# Patient Record
Sex: MALE | Race: WHITE | NOT HISPANIC OR LATINO | Employment: OTHER | URBAN - NONMETROPOLITAN AREA
[De-identification: names, ages, dates, MRNs, and addresses within clinical notes are randomized per-mention and may not be internally consistent; named-entity substitution may affect disease eponyms.]

---

## 2017-05-08 ENCOUNTER — OFFICE VISIT (OUTPATIENT)
Dept: UROLOGY | Facility: CLINIC | Age: 67
End: 2017-05-08

## 2017-05-08 VITALS
WEIGHT: 162.4 LBS | DIASTOLIC BLOOD PRESSURE: 72 MMHG | HEIGHT: 69 IN | SYSTOLIC BLOOD PRESSURE: 128 MMHG | TEMPERATURE: 97.3 F | BODY MASS INDEX: 24.05 KG/M2

## 2017-05-08 DIAGNOSIS — N40.1 BPH WITH URINARY OBSTRUCTION: Primary | ICD-10-CM

## 2017-05-08 DIAGNOSIS — N13.8 BPH WITH URINARY OBSTRUCTION: Primary | ICD-10-CM

## 2017-05-08 DIAGNOSIS — R31.29 MICROSCOPIC HEMATURIA: ICD-10-CM

## 2017-05-08 LAB
BILIRUB BLD-MCNC: NEGATIVE MG/DL
CLARITY, POC: CLEAR
COLOR UR: YELLOW
GLUCOSE UR STRIP-MCNC: NEGATIVE MG/DL
KETONES UR QL: NEGATIVE
LEUKOCYTE EST, POC: NEGATIVE
NITRITE UR-MCNC: NEGATIVE MG/ML
PH UR: 7 [PH] (ref 5–8)
PROT UR STRIP-MCNC: NEGATIVE MG/DL
RBC # UR STRIP: NEGATIVE /UL
SP GR UR: 1.01 (ref 1–1.03)
UROBILINOGEN UR QL: NORMAL

## 2017-05-08 PROCEDURE — 81003 URINALYSIS AUTO W/O SCOPE: CPT | Performed by: UROLOGY

## 2017-05-08 PROCEDURE — 99213 OFFICE O/P EST LOW 20 MIN: CPT | Performed by: UROLOGY

## 2017-10-28 DIAGNOSIS — N40.1 BPH WITH URINARY OBSTRUCTION: ICD-10-CM

## 2017-10-28 DIAGNOSIS — N13.8 BPH WITH URINARY OBSTRUCTION: ICD-10-CM

## 2017-10-30 RX ORDER — TAMSULOSIN HYDROCHLORIDE 0.4 MG/1
CAPSULE ORAL
Qty: 90 CAPSULE | Refills: 3 | Status: SHIPPED | OUTPATIENT
Start: 2017-10-30 | End: 2018-05-30 | Stop reason: SDUPTHER

## 2018-03-23 LAB
ALBUMIN SERPL-MCNC: 4.4 G/DL (ref 3.5–5.2)
ALP BLD-CCNC: 60 U/L (ref 40–130)
ALT SERPL-CCNC: 15 U/L (ref 5–41)
AST SERPL-CCNC: 18 U/L (ref 5–40)
BASOPHILS ABSOLUTE: 0 K/UL (ref 0–0.2)
BASOPHILS RELATIVE PERCENT: 1.2 % (ref 0–1)
BILIRUB SERPL-MCNC: 0.6 MG/DL (ref 0.2–1.2)
BILIRUBIN DIRECT: 0.1 MG/DL (ref 0–0.3)
BILIRUBIN, INDIRECT: 0.5 MG/DL (ref 0.1–1)
EOSINOPHILS ABSOLUTE: 0.1 K/UL (ref 0–0.6)
EOSINOPHILS RELATIVE PERCENT: 2.9 % (ref 0–5)
HCT VFR BLD CALC: 41.9 % (ref 42–52)
HEMOGLOBIN: 14.2 G/DL (ref 14–18)
LYMPHOCYTES ABSOLUTE: 0.7 K/UL (ref 1.1–4.5)
LYMPHOCYTES RELATIVE PERCENT: 20.9 % (ref 20–40)
MCH RBC QN AUTO: 31.8 PG (ref 27–31)
MCHC RBC AUTO-ENTMCNC: 33.9 G/DL (ref 33–37)
MCV RBC AUTO: 93.7 FL (ref 80–94)
MONOCYTES ABSOLUTE: 0.4 K/UL (ref 0–0.9)
MONOCYTES RELATIVE PERCENT: 11.2 % (ref 0–10)
NEUTROPHILS ABSOLUTE: 2.2 K/UL (ref 1.5–7.5)
NEUTROPHILS RELATIVE PERCENT: 63.5 % (ref 50–65)
PDW BLD-RTO: 12.2 % (ref 11.5–14.5)
PLATELET # BLD: 216 K/UL (ref 130–400)
PMV BLD AUTO: 10.3 FL (ref 9.4–12.4)
RBC # BLD: 4.47 M/UL (ref 4.7–6.1)
TOTAL PROTEIN: 6.5 G/DL (ref 6.6–8.7)
WBC # BLD: 3.4 K/UL (ref 4.8–10.8)

## 2018-05-30 ENCOUNTER — OFFICE VISIT (OUTPATIENT)
Dept: UROLOGY | Facility: CLINIC | Age: 68
End: 2018-05-30

## 2018-05-30 VITALS
WEIGHT: 163.8 LBS | HEIGHT: 69 IN | TEMPERATURE: 98 F | SYSTOLIC BLOOD PRESSURE: 130 MMHG | DIASTOLIC BLOOD PRESSURE: 70 MMHG | BODY MASS INDEX: 24.26 KG/M2

## 2018-05-30 DIAGNOSIS — N13.8 BPH WITH URINARY OBSTRUCTION: Primary | ICD-10-CM

## 2018-05-30 DIAGNOSIS — R31.29 MICROSCOPIC HEMATURIA: ICD-10-CM

## 2018-05-30 DIAGNOSIS — N40.1 BPH WITH URINARY OBSTRUCTION: Primary | ICD-10-CM

## 2018-05-30 LAB
BILIRUB BLD-MCNC: NEGATIVE MG/DL
CLARITY, POC: CLEAR
COLOR UR: YELLOW
GLUCOSE UR STRIP-MCNC: NEGATIVE MG/DL
KETONES UR QL: NEGATIVE
LEUKOCYTE EST, POC: NEGATIVE
NITRITE UR-MCNC: NEGATIVE MG/ML
PH UR: 6 [PH] (ref 5–8)
PROT UR STRIP-MCNC: NEGATIVE MG/DL
RBC # UR STRIP: ABNORMAL /UL
SP GR UR: 1.01 (ref 1–1.03)
UROBILINOGEN UR QL: NORMAL

## 2018-05-30 PROCEDURE — 51798 US URINE CAPACITY MEASURE: CPT | Performed by: UROLOGY

## 2018-05-30 PROCEDURE — 99213 OFFICE O/P EST LOW 20 MIN: CPT | Performed by: UROLOGY

## 2018-05-30 PROCEDURE — 81001 URINALYSIS AUTO W/SCOPE: CPT | Performed by: UROLOGY

## 2018-05-30 RX ORDER — TAMSULOSIN HYDROCHLORIDE 0.4 MG/1
1 CAPSULE ORAL
Qty: 90 CAPSULE | Refills: 3 | Status: SHIPPED | OUTPATIENT
Start: 2018-05-30 | End: 2019-04-22 | Stop reason: SDUPTHER

## 2018-05-30 NOTE — PROGRESS NOTES
Mr. Schulz is 67 y.o. male    Chief Complaint   Patient presents with   • Benign Prostatic Hypertrophy   • Blood in Urine       Benign Prostatic Hypertrophy   This is a chronic problem. The current episode started more than 1 month ago. The problem is unchanged. Irritative symptoms do not include frequency or urgency. Obstructive symptoms include an intermittent stream. Pertinent negatives include no chills or hematuria. AUA score is 8-19 (5, pleased). Nothing aggravates the symptoms. Past treatments include tamsulosin. The treatment provided significant relief. He has been using treatment for 1 to 2 years.   Blood in Urine   This is a chronic problem. The current episode started more than 1 month ago. The problem is unchanged. He describes the hematuria as microscopic hematuria. He is experiencing no pain. He describes his urine color as clear. Irritative symptoms do not include frequency or urgency. Obstructive symptoms include an intermittent stream. Pertinent negatives include no abdominal pain, chills, fever or flank pain. His past medical history is significant for BPH.       The following portions of the patient's history were reviewed and updated as appropriate: allergies, current medications, past family history, past medical history, past social history, past surgical history and problem list.    Review of Systems   Constitutional: Negative for chills and fever.   Gastrointestinal: Negative for abdominal pain, anal bleeding and blood in stool.   Genitourinary: Negative for difficulty urinating, flank pain, frequency, hematuria and urgency.         Current Outpatient Prescriptions:   •  Calcium Carbonate-Vit D-Min (CALCIUM 1200 PO), Take  by mouth., Disp: , Rfl:   •  lamoTRIgine (LaMICtal) 100 MG tablet, TAKE 1/2 TABLETS BY MOUTH DAILY, Disp: , Rfl: 6  •  levothyroxine (SYNTHROID, LEVOTHROID) 125 MCG tablet, , Disp: , Rfl:   •  QUEtiapine (SEROquel) 50 MG tablet, 1 TABLET(S) BY MOUTH NIGHTLY, Disp: , Rfl:  "3  •  tamsulosin (FLOMAX) 0.4 MG capsule 24 hr capsule, Take 1 capsule by mouth every night at bedtime., Disp: 90 capsule, Rfl: 3  •  Vitamin D, Cholecalciferol, 1000 UNITS capsule, Take  by mouth., Disp: , Rfl:     Past Medical History:   Diagnosis Date   • Acute retention of urine    • BPH (benign prostatic hyperplasia)    • Hyperthyroidism    • Microscopic hematuria    • Peyronie's disease        Past Surgical History:   Procedure Laterality Date   • THYROIDECTOMY     • VASECTOMY         Social History     Social History   • Marital status: Single     Social History Main Topics   • Smoking status: Never Smoker   • Smokeless tobacco: Never Used   • Alcohol use Yes   • Drug use: Unknown     Other Topics Concern   • Not on file       Family History   Problem Relation Age of Onset   • No Known Problems Father    • No Known Problems Mother        Objective    /70   Temp 98 °F (36.7 °C)   Ht 175.3 cm (69\")   Wt 74.3 kg (163 lb 12.8 oz)   BMI 24.19 kg/m²     Physical Exam    Office Visit on 05/08/2017   Component Date Value Ref Range Status   • Color 05/08/2017 Yellow  Yellow, Straw, Dark Yellow, Simona Final   • Clarity, UA 05/08/2017 Clear  Clear Final   • Glucose, UA 05/08/2017 Negative  Negative, 1000 mg/dL (3+) mg/dL Final   • Bilirubin 05/08/2017 Negative  Negative Final   • Ketones, UA 05/08/2017 Negative  Negative Final   • Specific Gravity  05/08/2017 1.015  1.005 - 1.030 Final   • Blood, UA 05/08/2017 Negative  Negative Final   • pH, Urine 05/08/2017 7.0  5.0 - 8.0 Final   • Protein, POC 05/08/2017 Negative  Negative mg/dL Final   • Urobilinogen, UA 05/08/2017 Normal  Normal Final   • Leukocytes 05/08/2017 Negative  Negative Final   • Nitrite, UA 05/08/2017 Negative  Negative Final       Results for orders placed or performed in visit on 05/30/18   POC Urinalysis Dipstick, Automated   Result Value Ref Range    Color Yellow Yellow, Straw, Dark Yellow, Simona    Clarity, UA Clear Clear    Specific " Gravity  1.010 1.005 - 1.030    pH, Urine 6.0 5.0 - 8.0    Leukocytes Negative Negative    Nitrite, UA Negative Negative    Protein, POC Negative Negative mg/dL    Glucose, UA Negative Negative, 1000 mg/dL (3+) mg/dL    Ketones, UA Negative Negative    Urobilinogen, UA Normal Normal    Bilirubin Negative Negative    Blood, UA Small (A) Negative     Assessment and Plan     David was seen today for benign prostatic hypertrophy and blood in urine.    Diagnoses and all orders for this visit:    BPH with urinary obstruction  -     POC Urinalysis Dipstick, Automated  -     tamsulosin (FLOMAX) 0.4 MG capsule 24 hr capsule; Take 1 capsule by mouth every night at bedtime.    Microscopic hematuria       Patient has been doing well since his last visit.  He continues to empty his bladder well.  He is overall happy with his urinary symptoms.  He will continue on the Flomax and I have rewritten this for him today.  With regards to his microscopic hematuria, he continues to have small blood in his urine.  He has had a previous negative workup.  No gross hematuria since his last visit.  No proteinuria.  No intervention needed at this point.  I will see him back in 1 year with repeat urinalysis and symptom check.      Estimation of residual urine via abdominal ultrasound  Residual Urine: 90 ml  Indication: bph  Position: Supine  Examination: Incremental scanning of the suprapubic area using 3 MHz transducer using copious amounts of acoustic gel.   Findings: An anechoic area was demonstrated which represented the bladder, with measurement of residual urine as noted. I inspected this myself. In that the residual urine was stable or insignificant, no treatment will be necessary at this time.

## 2018-06-01 ENCOUNTER — TELEPHONE (OUTPATIENT)
Dept: UROLOGY | Facility: CLINIC | Age: 68
End: 2018-06-01

## 2018-06-01 NOTE — TELEPHONE ENCOUNTER
Called patient to tell him Dr De La Cruz wants him to have a PSA at Dr Vergara's office. He said he would contact them to have this done. I asked him to have it faxed it to us. He voiced understanding.

## 2018-06-01 NOTE — TELEPHONE ENCOUNTER
----- Message from Brendon Duarte sent at 5/31/2018  5:19 PM CDT -----  Regarding: RE: psa  Please call patient and tell him to contact his pcp about getting a recent psa please  ----- Message -----  From: Rasta De La Cruz MD  Sent: 5/31/2018   5:00 PM  To: Brendon Duarte  Subject: RE: psa                                          Needs to discuss with his PCP getting a PSA this year  ----- Message -----  From: Brendon Duarte  Sent: 5/31/2018   4:46 PM  To: Rasta De La Cruz MD  Subject: psa                                              It was 0.2 in may 2017. That is the last one they had  ----- Message -----  From: Virginia Rosado MA  Sent: 5/31/2018   4:34 PM  To: Brendon Duarte        ----- Message -----  From: Rasta De La Cruz MD  Sent: 5/30/2018   1:28 PM  To: Virginia Rosado MA    Can you get his most recent PSA from Dr. Vergara?

## 2018-06-07 DIAGNOSIS — N13.8 BPH WITH URINARY OBSTRUCTION: Primary | ICD-10-CM

## 2018-06-07 DIAGNOSIS — N40.1 BPH WITH URINARY OBSTRUCTION: Primary | ICD-10-CM

## 2018-06-07 NOTE — TELEPHONE ENCOUNTER
Recent PSA results are in patient's chart but he called today and said that Dr. Vergara's office told him they cannot just get labs done without an order so wanted to know if we could fax over the order. There are no future orders in for a PSA and patient doesn't need to f/u for a year for urinalysis and symptom check. Does patient need a PSA done before?? Please advise

## 2018-06-12 ENCOUNTER — RESULTS ENCOUNTER (OUTPATIENT)
Dept: UROLOGY | Facility: CLINIC | Age: 68
End: 2018-06-12

## 2018-06-12 ENCOUNTER — TELEPHONE (OUTPATIENT)
Dept: UROLOGY | Facility: CLINIC | Age: 68
End: 2018-06-12

## 2018-06-12 DIAGNOSIS — N13.8 BPH WITH URINARY OBSTRUCTION: ICD-10-CM

## 2018-06-12 DIAGNOSIS — N40.1 BPH WITH URINARY OBSTRUCTION: ICD-10-CM

## 2018-06-12 LAB — PSA SERPL-MCNC: 0.17 NG/ML (ref 0–4)

## 2018-06-12 NOTE — TELEPHONE ENCOUNTER
----- Message from Rasta De La Cruz MD sent at 6/12/2018  4:41 PM CDT -----  Please let him know that his PSA is normal

## 2018-06-12 NOTE — TELEPHONE ENCOUNTER
Called and spoke with patient, I let him know his PSA was normal. Pt confirmed understanding all info

## 2019-04-22 ENCOUNTER — LAB (OUTPATIENT)
Dept: LAB | Facility: HOSPITAL | Age: 69
End: 2019-04-22

## 2019-04-22 ENCOUNTER — OFFICE VISIT (OUTPATIENT)
Dept: UROLOGY | Facility: CLINIC | Age: 69
End: 2019-04-22

## 2019-04-22 VITALS — TEMPERATURE: 98.8 F | WEIGHT: 158 LBS | BODY MASS INDEX: 23.4 KG/M2 | HEIGHT: 69 IN

## 2019-04-22 DIAGNOSIS — R31.29 MICROSCOPIC HEMATURIA: ICD-10-CM

## 2019-04-22 DIAGNOSIS — N40.1 BPH WITH URINARY OBSTRUCTION: ICD-10-CM

## 2019-04-22 DIAGNOSIS — N13.8 BPH WITH URINARY OBSTRUCTION: Primary | ICD-10-CM

## 2019-04-22 DIAGNOSIS — N13.8 BPH WITH URINARY OBSTRUCTION: ICD-10-CM

## 2019-04-22 DIAGNOSIS — N40.1 BPH WITH URINARY OBSTRUCTION: Primary | ICD-10-CM

## 2019-04-22 PROCEDURE — 36415 COLL VENOUS BLD VENIPUNCTURE: CPT

## 2019-04-22 PROCEDURE — 81003 URINALYSIS AUTO W/O SCOPE: CPT | Performed by: UROLOGY

## 2019-04-22 PROCEDURE — 99213 OFFICE O/P EST LOW 20 MIN: CPT | Performed by: UROLOGY

## 2019-04-22 PROCEDURE — 84153 ASSAY OF PSA TOTAL: CPT | Performed by: UROLOGY

## 2019-04-22 RX ORDER — TAMSULOSIN HYDROCHLORIDE 0.4 MG/1
1 CAPSULE ORAL
Qty: 90 CAPSULE | Refills: 3 | Status: SHIPPED | OUTPATIENT
Start: 2019-04-22 | End: 2020-07-29 | Stop reason: SDUPTHER

## 2019-04-22 NOTE — PROGRESS NOTES
Mr. Schulz is 68 y.o. male    Chief Complaint   Patient presents with   • Blood in Urine   • Benign Prostatic Hypertrophy       Benign Prostatic Hypertrophy   This is a chronic problem. The current episode started more than 1 month ago. The problem is unchanged. Irritative symptoms do not include frequency or urgency. Obstructive symptoms include an intermittent stream. Pertinent negatives include no chills, dysuria or hematuria. AUA score is 8-19. Nothing aggravates the symptoms. Past treatments include tamsulosin. The treatment provided significant relief. He has been using treatment for 1 to 2 years.   Blood in Urine   This is a chronic problem. The current episode started more than 1 month ago. The problem is unchanged. He describes the hematuria as microscopic hematuria. He is experiencing no pain. He describes his urine color as clear. Irritative symptoms do not include frequency or urgency. Obstructive symptoms include an intermittent stream. Pertinent negatives include no abdominal pain, chills, dysuria, fever or flank pain. His past medical history is significant for BPH.       The following portions of the patient's history were reviewed and updated as appropriate: allergies, current medications, past family history, past medical history, past social history, past surgical history and problem list.    Review of Systems   Constitutional: Negative for chills and fever.   Gastrointestinal: Negative for abdominal pain, anal bleeding and blood in stool.   Genitourinary: Negative for dysuria, flank pain, frequency, hematuria and urgency.       I have reviewed the review of systems      Current Outpatient Medications:   •  levothyroxine (SYNTHROID, LEVOTHROID) 125 MCG tablet, , Disp: , Rfl:   •  QUEtiapine (SEROquel) 50 MG tablet, 1 TABLET(S) BY MOUTH NIGHTLY, Disp: , Rfl: 3  •  tamsulosin (FLOMAX) 0.4 MG capsule 24 hr capsule, Take 1 capsule by mouth every night at bedtime., Disp: 90 capsule, Rfl: 3    Past  "Medical History:   Diagnosis Date   • Acute retention of urine    • BPH (benign prostatic hyperplasia)    • Hyperthyroidism    • Microscopic hematuria    • Peyronie's disease        Past Surgical History:   Procedure Laterality Date   • THYROIDECTOMY     • VASECTOMY         Social History     Socioeconomic History   • Marital status: Single     Spouse name: Not on file   • Number of children: Not on file   • Years of education: Not on file   • Highest education level: Not on file   Tobacco Use   • Smoking status: Never Smoker   • Smokeless tobacco: Never Used   Substance and Sexual Activity   • Alcohol use: Yes       Family History   Problem Relation Age of Onset   • No Known Problems Father    • No Known Problems Mother        Objective    Temp 98.8 °F (37.1 °C)   Ht 175.3 cm (69\")   Wt 71.7 kg (158 lb)   BMI 23.33 kg/m²     Physical Exam   Genitourinary:   Genitourinary Comments: 50 g prostate soft no nodules       Results Encounter on 06/12/2018   Component Date Value Ref Range Status   • PSA 06/12/2018 0.168  0.000 - 4.000 ng/mL Final       Results for orders placed or performed in visit on 04/22/19   POC Urinalysis Dipstick, Multipro   Result Value Ref Range    Color Yellow Yellow, Straw, Dark Yellow, Simona    Clarity, UA Clear Clear    Glucose, UA Negative Negative, 1000 mg/dL (3+) mg/dL    Bilirubin Negative Negative    Ketones, UA Negative Negative    Specific Gravity  1.010 1.005 - 1.030    Blood, UA Trace (A) Negative    pH, Urine 6.0 5.0 - 8.0    Protein, POC Negative Negative mg/dL    Urobilinogen, UA Normal Normal    Nitrite, UA Negative Negative    Leukocytes Negative Negative     Assessment and Plan    David was seen today for blood in urine and benign prostatic hypertrophy.    Diagnoses and all orders for this visit:    BPH with urinary obstruction  -     POC Urinalysis Dipstick, Multipro  -     PSA DIAGNOSTIC  -     PSA DIAGNOSTIC; Future  -     tamsulosin (FLOMAX) 0.4 MG capsule 24 hr capsule; " Take 1 capsule by mouth every night at bedtime.    Microscopic hematuria  -     POC Urinalysis Dipstick, Multipro    BPH, microscopic hematuria, to chronic stable conditions.  He does have trace blood in his urine today.  This has been previously worked up with a negative hematuria work-up.  No protein in his urine today.  Continue to monitor.    With regards to his BPH, he is overall happy with his Flomax and he would like to continue on his medication.  He has not had a PSA screening this year and would like for me to get this from today.  I will call him with the results of this.  He will see the nurse practitioner in 1 year with a repeat PSA.

## 2019-04-23 LAB — PSA SERPL-MCNC: 0.2 NG/ML (ref 0–4)

## 2019-04-24 ENCOUNTER — TELEPHONE (OUTPATIENT)
Dept: UROLOGY | Facility: CLINIC | Age: 69
End: 2019-04-24

## 2019-04-24 NOTE — TELEPHONE ENCOUNTER
----- Message from Rasta De La Cruz MD sent at 4/23/2019  7:38 AM CDT -----  Please let him know that his psa is normal

## 2019-06-17 LAB
BASOPHILS ABSOLUTE: 0 K/UL (ref 0–0.2)
BASOPHILS RELATIVE PERCENT: 1.3 % (ref 0–1)
EOSINOPHILS ABSOLUTE: 0.2 K/UL (ref 0–0.6)
EOSINOPHILS RELATIVE PERCENT: 6.3 % (ref 0–5)
HCT VFR BLD CALC: 38.9 % (ref 42–52)
HEMOGLOBIN: 13.3 G/DL (ref 14–18)
LYMPHOCYTES ABSOLUTE: 0.6 K/UL (ref 1.1–4.5)
LYMPHOCYTES RELATIVE PERCENT: 18.3 % (ref 20–40)
MCH RBC QN AUTO: 32.2 PG (ref 27–31)
MCHC RBC AUTO-ENTMCNC: 34.2 G/DL (ref 33–37)
MCV RBC AUTO: 94.2 FL (ref 80–94)
MONOCYTES ABSOLUTE: 0.4 K/UL (ref 0–0.9)
MONOCYTES RELATIVE PERCENT: 13 % (ref 0–10)
NEUTROPHILS ABSOLUTE: 1.8 K/UL (ref 1.5–7.5)
NEUTROPHILS RELATIVE PERCENT: 60.8 % (ref 50–65)
PDW BLD-RTO: 12.2 % (ref 11.5–14.5)
PLATELET # BLD: 187 K/UL (ref 130–400)
PMV BLD AUTO: 10.2 FL (ref 9.4–12.4)
RBC # BLD: 4.13 M/UL (ref 4.7–6.1)
WBC # BLD: 3 K/UL (ref 4.8–10.8)

## 2019-08-31 ENCOUNTER — APPOINTMENT (OUTPATIENT)
Dept: GENERAL RADIOLOGY | Age: 69
DRG: 310 | End: 2019-08-31
Payer: MEDICARE

## 2019-08-31 ENCOUNTER — HOSPITAL ENCOUNTER (INPATIENT)
Age: 69
LOS: 4 days | Discharge: HOME | DRG: 310 | End: 2019-09-04
Attending: EMERGENCY MEDICINE | Admitting: FAMILY MEDICINE
Payer: MEDICARE

## 2019-08-31 DIAGNOSIS — R07.9 CHEST PAIN, UNSPECIFIED TYPE: ICD-10-CM

## 2019-08-31 DIAGNOSIS — I48.91 ATRIAL FIBRILLATION WITH RVR (HCC): Primary | ICD-10-CM

## 2019-08-31 DIAGNOSIS — I48.91 NEW ONSET A-FIB (HCC): ICD-10-CM

## 2019-08-31 LAB
ALBUMIN SERPL-MCNC: 4.5 G/DL (ref 3.5–5.2)
ALP BLD-CCNC: 54 U/L (ref 40–130)
ALT SERPL-CCNC: 11 U/L (ref 5–41)
ANION GAP SERPL CALCULATED.3IONS-SCNC: 15 MMOL/L (ref 7–19)
AST SERPL-CCNC: 16 U/L (ref 5–40)
BACTERIA: NEGATIVE /HPF
BASOPHILS ABSOLUTE: 0.1 K/UL (ref 0–0.2)
BASOPHILS RELATIVE PERCENT: 1 % (ref 0–1)
BILIRUB SERPL-MCNC: 1.1 MG/DL (ref 0.2–1.2)
BILIRUBIN URINE: NEGATIVE
BLOOD, URINE: ABNORMAL
BUN BLDV-MCNC: 11 MG/DL (ref 8–23)
CALCIUM SERPL-MCNC: 8.3 MG/DL (ref 8.8–10.2)
CHLORIDE BLD-SCNC: 97 MMOL/L (ref 98–111)
CLARITY: CLEAR
CO2: 22 MMOL/L (ref 22–29)
COLOR: YELLOW
CREAT SERPL-MCNC: 0.6 MG/DL (ref 0.5–1.2)
D DIMER: <0.27 UG/ML FEU (ref 0–0.48)
EOSINOPHILS ABSOLUTE: 0.1 K/UL (ref 0–0.6)
EOSINOPHILS RELATIVE PERCENT: 1.2 % (ref 0–5)
EPITHELIAL CELLS, UA: 0 /HPF (ref 0–5)
GFR NON-AFRICAN AMERICAN: >60
GLUCOSE BLD-MCNC: 105 MG/DL (ref 74–109)
GLUCOSE URINE: NEGATIVE MG/DL
HCT VFR BLD CALC: 41.7 % (ref 42–52)
HEMOGLOBIN: 14.7 G/DL (ref 14–18)
HYALINE CASTS: 0 /HPF (ref 0–8)
IMMATURE GRANULOCYTES #: 0 K/UL
INR BLD: 1.05 (ref 0.88–1.18)
KETONES, URINE: NEGATIVE MG/DL
LEUKOCYTE ESTERASE, URINE: NEGATIVE
LIPASE: 34 U/L (ref 13–60)
LYMPHOCYTES ABSOLUTE: 1 K/UL (ref 1.1–4.5)
LYMPHOCYTES RELATIVE PERCENT: 16.5 % (ref 20–40)
MCH RBC QN AUTO: 32.2 PG (ref 27–31)
MCHC RBC AUTO-ENTMCNC: 35.3 G/DL (ref 33–37)
MCV RBC AUTO: 91.2 FL (ref 80–94)
MONOCYTES ABSOLUTE: 0.5 K/UL (ref 0–0.9)
MONOCYTES RELATIVE PERCENT: 8.3 % (ref 0–10)
NEUTROPHILS ABSOLUTE: 4.3 K/UL (ref 1.5–7.5)
NEUTROPHILS RELATIVE PERCENT: 72.8 % (ref 50–65)
NITRITE, URINE: NEGATIVE
PDW BLD-RTO: 11.9 % (ref 11.5–14.5)
PH UA: 7 (ref 5–8)
PLATELET # BLD: 205 K/UL (ref 130–400)
PMV BLD AUTO: 10.2 FL (ref 9.4–12.4)
POTASSIUM SERPL-SCNC: 3.9 MMOL/L (ref 3.5–5)
PRO-BNP: 293 PG/ML (ref 0–900)
PROTEIN UA: NEGATIVE MG/DL
PROTHROMBIN TIME: 13.1 SEC (ref 12–14.6)
RBC # BLD: 4.57 M/UL (ref 4.7–6.1)
RBC UA: 2 /HPF (ref 0–4)
SODIUM BLD-SCNC: 134 MMOL/L (ref 136–145)
SPECIFIC GRAVITY UA: 1.01 (ref 1–1.03)
TOTAL PROTEIN: 6.6 G/DL (ref 6.6–8.7)
TROPONIN: <0.01 NG/ML (ref 0–0.03)
TSH SERPL DL<=0.05 MIU/L-ACNC: 0.75 UIU/ML (ref 0.27–4.2)
URINE REFLEX TO CULTURE: ABNORMAL
UROBILINOGEN, URINE: 0.2 E.U./DL
WBC # BLD: 5.9 K/UL (ref 4.8–10.8)
WBC UA: 0 /HPF (ref 0–5)

## 2019-08-31 PROCEDURE — 80053 COMPREHEN METABOLIC PANEL: CPT

## 2019-08-31 PROCEDURE — 36415 COLL VENOUS BLD VENIPUNCTURE: CPT

## 2019-08-31 PROCEDURE — 84484 ASSAY OF TROPONIN QUANT: CPT

## 2019-08-31 PROCEDURE — 96372 THER/PROPH/DIAG INJ SC/IM: CPT

## 2019-08-31 PROCEDURE — 83880 ASSAY OF NATRIURETIC PEPTIDE: CPT

## 2019-08-31 PROCEDURE — 71045 X-RAY EXAM CHEST 1 VIEW: CPT

## 2019-08-31 PROCEDURE — 85025 COMPLETE CBC W/AUTO DIFF WBC: CPT

## 2019-08-31 PROCEDURE — 85379 FIBRIN DEGRADATION QUANT: CPT

## 2019-08-31 PROCEDURE — 6370000000 HC RX 637 (ALT 250 FOR IP): Performed by: EMERGENCY MEDICINE

## 2019-08-31 PROCEDURE — 2580000003 HC RX 258: Performed by: INTERNAL MEDICINE

## 2019-08-31 PROCEDURE — 84443 ASSAY THYROID STIM HORMONE: CPT

## 2019-08-31 PROCEDURE — 81001 URINALYSIS AUTO W/SCOPE: CPT

## 2019-08-31 PROCEDURE — 93005 ELECTROCARDIOGRAM TRACING: CPT

## 2019-08-31 PROCEDURE — 2140000000 HC CCU INTERMEDIATE R&B

## 2019-08-31 PROCEDURE — 96374 THER/PROPH/DIAG INJ IV PUSH: CPT

## 2019-08-31 PROCEDURE — 96376 TX/PRO/DX INJ SAME DRUG ADON: CPT

## 2019-08-31 PROCEDURE — 6360000002 HC RX W HCPCS: Performed by: INTERNAL MEDICINE

## 2019-08-31 PROCEDURE — 99291 CRITICAL CARE FIRST HOUR: CPT

## 2019-08-31 PROCEDURE — 6360000002 HC RX W HCPCS: Performed by: EMERGENCY MEDICINE

## 2019-08-31 PROCEDURE — 83690 ASSAY OF LIPASE: CPT

## 2019-08-31 PROCEDURE — 6370000000 HC RX 637 (ALT 250 FOR IP): Performed by: INTERNAL MEDICINE

## 2019-08-31 PROCEDURE — 2580000003 HC RX 258: Performed by: EMERGENCY MEDICINE

## 2019-08-31 PROCEDURE — 85610 PROTHROMBIN TIME: CPT

## 2019-08-31 PROCEDURE — 2500000003 HC RX 250 WO HCPCS: Performed by: EMERGENCY MEDICINE

## 2019-08-31 RX ORDER — LEVOTHYROXINE SODIUM 0.1 MG/1
100 TABLET ORAL DAILY
Status: ON HOLD | COMMUNITY
End: 2019-09-04 | Stop reason: HOSPADM

## 2019-08-31 RX ORDER — SODIUM CHLORIDE 0.9 % (FLUSH) 0.9 %
10 SYRINGE (ML) INJECTION EVERY 12 HOURS SCHEDULED
Status: DISCONTINUED | OUTPATIENT
Start: 2019-08-31 | End: 2019-09-04 | Stop reason: HOSPADM

## 2019-08-31 RX ORDER — ASPIRIN 81 MG/1
81 TABLET, CHEWABLE ORAL DAILY
Status: DISCONTINUED | OUTPATIENT
Start: 2019-09-01 | End: 2019-09-04 | Stop reason: HOSPADM

## 2019-08-31 RX ORDER — QUETIAPINE FUMARATE 50 MG/1
50 TABLET, FILM COATED ORAL NIGHTLY
Status: DISCONTINUED | OUTPATIENT
Start: 2019-08-31 | End: 2019-09-04 | Stop reason: HOSPADM

## 2019-08-31 RX ORDER — 0.9 % SODIUM CHLORIDE 0.9 %
500 INTRAVENOUS SOLUTION INTRAVENOUS ONCE
Status: COMPLETED | OUTPATIENT
Start: 2019-08-31 | End: 2019-08-31

## 2019-08-31 RX ORDER — LEVOTHYROXINE SODIUM 0.1 MG/1
100 TABLET ORAL DAILY
Status: DISCONTINUED | OUTPATIENT
Start: 2019-09-01 | End: 2019-09-04 | Stop reason: HOSPADM

## 2019-08-31 RX ORDER — DILTIAZEM HYDROCHLORIDE 5 MG/ML
5 INJECTION INTRAVENOUS ONCE
Status: COMPLETED | OUTPATIENT
Start: 2019-08-31 | End: 2019-08-31

## 2019-08-31 RX ORDER — ONDANSETRON 2 MG/ML
4 INJECTION INTRAMUSCULAR; INTRAVENOUS EVERY 6 HOURS PRN
Status: DISCONTINUED | OUTPATIENT
Start: 2019-08-31 | End: 2019-09-01

## 2019-08-31 RX ORDER — TAMSULOSIN HYDROCHLORIDE 0.4 MG/1
0.4 CAPSULE ORAL NIGHTLY
COMMUNITY
End: 2021-03-29 | Stop reason: SDUPTHER

## 2019-08-31 RX ORDER — TAMSULOSIN HYDROCHLORIDE 0.4 MG/1
0.4 CAPSULE ORAL NIGHTLY
Status: DISCONTINUED | OUTPATIENT
Start: 2019-08-31 | End: 2019-09-04 | Stop reason: HOSPADM

## 2019-08-31 RX ORDER — DILTIAZEM HYDROCHLORIDE 5 MG/ML
10 INJECTION INTRAVENOUS ONCE
Status: COMPLETED | OUTPATIENT
Start: 2019-08-31 | End: 2019-08-31

## 2019-08-31 RX ORDER — SODIUM CHLORIDE 0.9 % (FLUSH) 0.9 %
10 SYRINGE (ML) INJECTION PRN
Status: DISCONTINUED | OUTPATIENT
Start: 2019-08-31 | End: 2019-09-04 | Stop reason: HOSPADM

## 2019-08-31 RX ORDER — MULTIVIT-MIN/IRON/FOLIC ACID/K 18-600-40
2000 CAPSULE ORAL DAILY
COMMUNITY
End: 2021-03-29 | Stop reason: ALTCHOICE

## 2019-08-31 RX ADMIN — Medication 10 ML: at 21:22

## 2019-08-31 RX ADMIN — ENOXAPARIN SODIUM 70 MG: 80 INJECTION SUBCUTANEOUS at 13:55

## 2019-08-31 RX ADMIN — DILTIAZEM HYDROCHLORIDE 10 MG: 5 INJECTION INTRAVENOUS at 13:01

## 2019-08-31 RX ADMIN — DILTIAZEM HYDROCHLORIDE 5 MG/HR: 5 INJECTION INTRAVENOUS at 13:42

## 2019-08-31 RX ADMIN — DILTIAZEM HYDROCHLORIDE 5 MG: 5 INJECTION INTRAVENOUS at 13:38

## 2019-08-31 RX ADMIN — QUETIAPINE FUMARATE 50 MG: 50 TABLET ORAL at 21:22

## 2019-08-31 RX ADMIN — ASPIRIN 325 MG: 325 TABLET, DELAYED RELEASE ORAL at 14:02

## 2019-08-31 RX ADMIN — TAMSULOSIN HYDROCHLORIDE 0.4 MG: 0.4 CAPSULE ORAL at 21:22

## 2019-08-31 RX ADMIN — ENOXAPARIN SODIUM 70 MG: 80 INJECTION SUBCUTANEOUS at 21:22

## 2019-08-31 RX ADMIN — SODIUM CHLORIDE 500 ML: 9 INJECTION, SOLUTION INTRAVENOUS at 13:55

## 2019-08-31 ASSESSMENT — ENCOUNTER SYMPTOMS
ABDOMINAL PAIN: 0
ANAL BLEEDING: 0
CHOKING: 0
VOMITING: 0
COUGH: 0
SHORTNESS OF BREATH: 1
DIARRHEA: 0

## 2019-08-31 ASSESSMENT — PAIN DESCRIPTION - LOCATION
LOCATION: BACK;SHOULDER
LOCATION: OTHER (COMMENT)
LOCATION: CHEST

## 2019-08-31 ASSESSMENT — PAIN SCALES - GENERAL
PAINLEVEL_OUTOF10: 5
PAINLEVEL_OUTOF10: 7
PAINLEVEL_OUTOF10: 4

## 2019-08-31 ASSESSMENT — PAIN DESCRIPTION - ORIENTATION
ORIENTATION: LEFT
ORIENTATION: LEFT

## 2019-08-31 ASSESSMENT — PAIN DESCRIPTION - PAIN TYPE
TYPE: CHRONIC PAIN
TYPE: ACUTE PAIN

## 2019-08-31 NOTE — ED PROVIDER NOTES
Pulmonary/Chest: Effort normal and breath sounds normal. No stridor. No respiratory distress. Abdominal: Soft. There is no tenderness. Musculoskeletal: Normal range of motion. He exhibits no edema or deformity. Neurological: He is alert and oriented to person, place, and time. Skin: Skin is warm and dry. He is not diaphoretic. Psychiatric: He has a normal mood and affect. His behavior is normal. Thought content normal.   Nursing note and vitals reviewed. DIAGNOSTIC RESULTS     EKG: All EKG's are interpreted by the Emergency Department Physician who either signs or Co-signs this chart in the absence of a cardiologist.    EKG atrial fibrillation RVR no acute ST elevations. RADIOLOGY:   Non-plain film images such as CT, Ultrasound and MRI are read by the radiologist. Rosalina Prado images are visualized and preliminarily interpreted by the emergency physician with the below findings:        Interpretation per the Radiologist below, if available at the time of this note:    XR CHEST PORTABLE   Final Result   1. No acute cardiopulmonary finding.     Signed by Dr Samuel Culp on 8/31/2019 1:08 PM            ED BEDSIDE ULTRASOUND:   Performed by ED Physician - none    LABS:  Labs Reviewed   COMPREHENSIVE METABOLIC PANEL - Abnormal; Notable for the following components:       Result Value    Sodium 134 (*)     Chloride 97 (*)     Calcium 8.3 (*)     All other components within normal limits   CBC WITH AUTO DIFFERENTIAL - Abnormal; Notable for the following components:    RBC 4.57 (*)     Hematocrit 41.7 (*)     MCH 32.2 (*)     Neutrophils % 72.8 (*)     Lymphocytes % 16.5 (*)     Lymphocytes Absolute 1.0 (*)     All other components within normal limits   URINE RT REFLEX TO CULTURE - Abnormal; Notable for the following components:    Blood, Urine SMALL (*)     All other components within normal limits   LIPASE   PROTIME-INR   BRAIN NATRIURETIC PEPTIDE   TROPONIN   TSH WITHOUT REFLEX   D-DIMER,

## 2019-09-01 PROBLEM — I95.1 ORTHOSTASIS: Status: ACTIVE | Noted: 2019-09-01

## 2019-09-01 PROBLEM — E03.9 HYPOTHYROIDISM: Status: ACTIVE | Noted: 2019-09-01

## 2019-09-01 LAB
CHOLESTEROL, TOTAL: 198 MG/DL (ref 160–199)
EKG P AXIS: NORMAL DEGREES
EKG P AXIS: NORMAL DEGREES
EKG P-R INTERVAL: NORMAL MS
EKG P-R INTERVAL: NORMAL MS
EKG Q-T INTERVAL: 330 MS
EKG Q-T INTERVAL: 384 MS
EKG QRS DURATION: 84 MS
EKG QRS DURATION: 88 MS
EKG QTC CALCULATION (BAZETT): 414 MS
EKG QTC CALCULATION (BAZETT): 438 MS
EKG T AXIS: 30 DEGREES
EKG T AXIS: 50 DEGREES
HCT VFR BLD CALC: 42.3 % (ref 42–52)
HDLC SERPL-MCNC: 71 MG/DL (ref 55–121)
HEMOGLOBIN: 14.6 G/DL (ref 14–18)
LDL CHOLESTEROL CALCULATED: 110 MG/DL
LV EF: 42 %
LVEF MODALITY: NORMAL
MCH RBC QN AUTO: 31.9 PG (ref 27–31)
MCHC RBC AUTO-ENTMCNC: 34.5 G/DL (ref 33–37)
MCV RBC AUTO: 92.4 FL (ref 80–94)
PDW BLD-RTO: 12.2 % (ref 11.5–14.5)
PLATELET # BLD: 196 K/UL (ref 130–400)
PMV BLD AUTO: 10.9 FL (ref 9.4–12.4)
RBC # BLD: 4.58 M/UL (ref 4.7–6.1)
T4 FREE: 1.4 NG/DL (ref 0.9–1.7)
TRIGL SERPL-MCNC: 83 MG/DL (ref 0–149)
TROPONIN: <0.01 NG/ML (ref 0–0.03)
WBC # BLD: 3.9 K/UL (ref 4.8–10.8)

## 2019-09-01 PROCEDURE — 2140000000 HC CCU INTERMEDIATE R&B

## 2019-09-01 PROCEDURE — 6370000000 HC RX 637 (ALT 250 FOR IP): Performed by: INTERNAL MEDICINE

## 2019-09-01 PROCEDURE — 99223 1ST HOSP IP/OBS HIGH 75: CPT | Performed by: INTERNAL MEDICINE

## 2019-09-01 PROCEDURE — 2580000003 HC RX 258: Performed by: INTERNAL MEDICINE

## 2019-09-01 PROCEDURE — 36415 COLL VENOUS BLD VENIPUNCTURE: CPT

## 2019-09-01 PROCEDURE — 84439 ASSAY OF FREE THYROXINE: CPT

## 2019-09-01 PROCEDURE — 84484 ASSAY OF TROPONIN QUANT: CPT

## 2019-09-01 PROCEDURE — 6360000002 HC RX W HCPCS: Performed by: INTERNAL MEDICINE

## 2019-09-01 PROCEDURE — 80061 LIPID PANEL: CPT

## 2019-09-01 PROCEDURE — 85027 COMPLETE CBC AUTOMATED: CPT

## 2019-09-01 PROCEDURE — 2580000003 HC RX 258

## 2019-09-01 PROCEDURE — 93306 TTE W/DOPPLER COMPLETE: CPT

## 2019-09-01 PROCEDURE — 93005 ELECTROCARDIOGRAM TRACING: CPT

## 2019-09-01 RX ORDER — SODIUM CHLORIDE 9 MG/ML
INJECTION, SOLUTION INTRAVENOUS CONTINUOUS PRN
Status: DISCONTINUED | OUTPATIENT
Start: 2019-09-01 | End: 2019-09-04 | Stop reason: HOSPADM

## 2019-09-01 RX ORDER — SODIUM CHLORIDE 9 MG/ML
INJECTION, SOLUTION INTRAVENOUS CONTINUOUS
Status: DISCONTINUED | OUTPATIENT
Start: 2019-09-01 | End: 2019-09-04 | Stop reason: HOSPADM

## 2019-09-01 RX ORDER — SOTALOL HYDROCHLORIDE 80 MG/1
80 TABLET ORAL 2 TIMES DAILY
Status: DISCONTINUED | OUTPATIENT
Start: 2019-09-01 | End: 2019-09-02 | Stop reason: DRUGHIGH

## 2019-09-01 RX ADMIN — LEVOTHYROXINE SODIUM 137 MCG: 25 TABLET ORAL at 06:21

## 2019-09-01 RX ADMIN — SOTALOL HYDROCHLORIDE 80 MG: 80 TABLET ORAL at 21:31

## 2019-09-01 RX ADMIN — TAMSULOSIN HYDROCHLORIDE 0.4 MG: 0.4 CAPSULE ORAL at 21:00

## 2019-09-01 RX ADMIN — LEVOTHYROXINE SODIUM 100 MCG: 0.1 TABLET ORAL at 06:20

## 2019-09-01 RX ADMIN — ENOXAPARIN SODIUM 70 MG: 80 INJECTION SUBCUTANEOUS at 21:31

## 2019-09-01 RX ADMIN — SODIUM CHLORIDE: 9 INJECTION, SOLUTION INTRAVENOUS at 11:48

## 2019-09-01 RX ADMIN — QUETIAPINE FUMARATE 50 MG: 50 TABLET ORAL at 21:31

## 2019-09-01 RX ADMIN — SOTALOL HYDROCHLORIDE 80 MG: 80 TABLET ORAL at 11:41

## 2019-09-01 RX ADMIN — SODIUM CHLORIDE: 9 INJECTION, SOLUTION INTRAVENOUS at 07:50

## 2019-09-01 RX ADMIN — Medication 10 ML: at 21:31

## 2019-09-01 RX ADMIN — SODIUM CHLORIDE: 9 INJECTION, SOLUTION INTRAVENOUS at 21:30

## 2019-09-01 RX ADMIN — SODIUM CHLORIDE: 9 INJECTION, SOLUTION INTRAVENOUS at 06:25

## 2019-09-01 RX ADMIN — Medication 10 ML: at 09:20

## 2019-09-01 RX ADMIN — ASPIRIN 81 MG 81 MG: 81 TABLET ORAL at 09:20

## 2019-09-01 RX ADMIN — ENOXAPARIN SODIUM 70 MG: 80 INJECTION SUBCUTANEOUS at 09:20

## 2019-09-01 ASSESSMENT — PAIN SCALES - GENERAL
PAINLEVEL_OUTOF10: 0
PAINLEVEL_OUTOF10: 0

## 2019-09-01 ASSESSMENT — ENCOUNTER SYMPTOMS
GASTROINTESTINAL NEGATIVE: 1
NAUSEA: 0
DIARRHEA: 0
SHORTNESS OF BREATH: 0
EYES NEGATIVE: 1
VOMITING: 0
RESPIRATORY NEGATIVE: 1

## 2019-09-01 NOTE — CONSULTS
[facility]  Cardiology Consult      Requesting MD:  Rufina Brewer MD   Admit Status:  Inpatient [101]       History obtained from:   [] Patient  [] Other (specify):     Patient:  Byron Akers  [id number]     Chief Complaint:   Chief Complaint   Patient presents with    Chest Pain     starting today, reports pain in left shoulder radiating down back     HPI: Mr. Crystal Nichols is a 78 y.o. male admitted 9/1/2019 complaints of palpitations discomfort left shoulder she has been increasing a few days previously. He has chronic left shoulder pain due to previous fracture. He did notice his heart was racing a day or 2 ago. Reported chest pain. No limiting dyspnea. Evaluate emergency department found to be in atrial fibrillation rapid ventricular response rate no previous cardiac history or evaluation. Review of Systems:  Review of Systems   Constitutional: Negative. Negative for chills, fever and unexpected weight change. HENT: Negative. Eyes: Negative. Respiratory: Negative. Negative for shortness of breath. Cardiovascular: Negative. Negative for chest pain. Gastrointestinal: Negative. Negative for diarrhea, nausea and vomiting. Endocrine: Negative. Genitourinary: Negative. Musculoskeletal: Negative. Skin: Negative. Neurological: Negative.         Cardiac Specific Data:  Specialty Problems        Cardiology Problems    * (Principal) Atrial fibrillation with RVR (HCC)        Orthostasis              Past Medical History:  Past Medical History:   Diagnosis Date    Bipolar disorder ([address]     Colon polyps     Hypothyroidism     Insomnia         Past Surgical History:  Past Surgical History:   Procedure Laterality Date    CARPAL TUNNEL RELEASE Right     approx 5 years ago    CLAVICLE SURGERY      x 2    COLONOSCOPY  ? 5-6 years ago        FOOT SURGERY      x 2    HERNIA REPAIR      JOINT REPLACEMENT Right     hip    THYROIDECTOMY      VARICOSE VEIN SURGERY      VASECTOMY         Past Family History:  Family History   Problem Relation Age of Onset    Dementia Mother     Obesity Mother     Stroke Father     Heart Attack Father     Other Sister     Heart Disease Brother     Heart Disease Brother     Colon Cancer Neg Hx     Colon Polyps Neg Hx        Past Social History:  Social History     Socioeconomic History    Marital status: Not on file     Spouse name: Not on file    Number of children: Not on file    Years of education: Not on file    Highest education level: Not on file   Occupational History    Not on file   Social Needs    Financial resource strain: Not on file    Food insecurity:     Worry: Not on file     Inability: Not on file    Transportation needs:     Medical: Not on file     Non-medical: Not on file   Tobacco Use    Smoking status: Never Smoker    Smokeless tobacco: Never Used   Substance and Sexual Activity    Alcohol use:  Yes     Alcohol/week: 3.0 standard drinks     Types: 3 Glasses of wine per week    Drug use: No    Sexual activity: Not on file   Lifestyle    Physical activity:     Days per week: Not on file     Minutes per session: Not on file    Stress: Not on file   Relationships    Social connections:     Talks on phone: Not on file     Gets together: Not on file     Attends Advent service: Not on file     Active member of club or organization: Not on file     Attends meetings of clubs or organizations: Not on file     Relationship status: Not on file    Intimate partner violence:     Fear of current or ex partner: Not on file     Emotionally abused: Not on file     Physically abused: Not on file     Forced sexual activity: Not on file   Other Topics Concern    Not on file   Social History Narrative    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    Weight as of this encounter: 164 lb 9.6 oz (74.7 kg). Physical Exam   Constitutional: He is oriented to person, place, and time. He appears well-developed and well-nourished. No distress. HENT:   Head: Normocephalic and atraumatic. Eyes: Pupils are equal, round, and reactive to light. EOM are normal. No scleral icterus. Neck: Normal range of motion. Neck supple. No JVD present. Carotid bruit is not present. No tracheal deviation present. No thyromegaly present. No carotid artery bruits auscultated   Cardiovascular: Normal rate, regular rhythm and normal heart sounds. Exam reveals no gallop and no friction rub. No murmur heard. Pulmonary/Chest: Effort normal and breath sounds normal. No stridor. No respiratory distress. He has no wheezes. He has no rales. He exhibits no tenderness. Abdominal: Soft. Bowel sounds are normal. He exhibits no distension and no mass. There is no tenderness. There is no rebound and no guarding. No hernia. Musculoskeletal: He exhibits no edema. Lymphadenopathy:     He has no cervical adenopathy. Neurological: He is alert and oriented to person, place, and time. No cranial nerve deficit or sensory deficit. He exhibits normal muscle tone. Coordination normal.   Skin: Skin is warm and dry. He is not diaphoretic. Psychiatric: He has a normal mood and affect. His behavior is normal. Judgment and thought content normal.   Vitals reviewed. Labs:  Recent Labs     08/31/19  1256 09/01/19  0330   WBC 5.9 3.9*   HGB 14.7 14.6    196       Recent Labs     08/31/19  1256   *   K 3.9   CL 97*   CO2 22   BUN 11   CREATININE 0.6   LABGLOM >60   CALCIUM 8.3*       CK, CKMB, Troponin: @LABRCNT (CKTOTAL:3, CKMB:3, TROPONINI:3)@    Last 3 BNP:  No results for input(s): BNP in the last 72 hours.         IMAGING:  Xr Chest Portable    Result Date: 8/31/2019  EXAM: XR CHEST PORTABLE -- 8/31/2019 12:00 PM HISTORY: 69 years, Male, shortness of breath, chest pain

## 2019-09-01 NOTE — H&P
Date of Admission: 8/31/2019  Primary Care Physician: Renée Locke MD    Subjective     Chief Complaint: Heart racing    HPI  Patient returns the hospital with feelings of heart racing and discomfort in the left shoulder. He reports chronic left shoulder pain due to prior fracture. He felt some increasing discomfort a couple days ago. He then noted that his heart was racing. He was getting a little bit dizzy. He did not have chest pain. He did not have shortness of breath. No clear exacerbating or alleviating factors. He had had recent injections in his wrist and started on a topical arthritis medicine and thought it might be a side effect of that. He came to the ER for evaluation was found to be in A. fib with RVR. He has no prior history of heart disease or A. fib. Review of Systems   Fever or chill. No chest pain. Otherwise complete ROS reviewed and negative except as mentioned in the HPI. Past Medical History:   Past Medical History:   Diagnosis Date    Bipolar disorder ([address]     Colon polyps     Hypothyroidism     Insomnia        Past Surgical History:  Past Surgical History:   Procedure Laterality Date    CARPAL TUNNEL RELEASE Right     approx 5 years ago    CLAVICLE SURGERY      x 2    COLONOSCOPY  ? 5-6 years ago        FOOT SURGERY      x 2    HERNIA REPAIR      JOINT REPLACEMENT Right     hip    THYROIDECTOMY      VARICOSE VEIN SURGERY      VASECTOMY         Social History:  reports that he has never smoked. He has never used smokeless tobacco. He reports that he drinks about 3.0 standard drinks of alcohol per week. He reports that he does not use drugs. Family History: family history includes Dementia in his mother; Heart Attack in his father; Heart Disease in his brother and brother; Obesity in his mother; Other in his sister; Stroke in his father. Allergies:   Allergies   Allergen Reactions    Morphine Itching       Medications:  Prior to

## 2019-09-02 PROCEDURE — 2140000000 HC CCU INTERMEDIATE R&B

## 2019-09-02 PROCEDURE — 6360000002 HC RX W HCPCS: Performed by: INTERNAL MEDICINE

## 2019-09-02 PROCEDURE — 2580000003 HC RX 258: Performed by: INTERNAL MEDICINE

## 2019-09-02 PROCEDURE — 99232 SBSQ HOSP IP/OBS MODERATE 35: CPT | Performed by: INTERNAL MEDICINE

## 2019-09-02 PROCEDURE — 6370000000 HC RX 637 (ALT 250 FOR IP): Performed by: INTERNAL MEDICINE

## 2019-09-02 PROCEDURE — 93005 ELECTROCARDIOGRAM TRACING: CPT

## 2019-09-02 RX ORDER — SOTALOL HYDROCHLORIDE 80 MG/1
40 TABLET ORAL 2 TIMES DAILY
Status: DISCONTINUED | OUTPATIENT
Start: 2019-09-02 | End: 2019-09-04

## 2019-09-02 RX ORDER — ACETAMINOPHEN 325 MG/1
650 TABLET ORAL EVERY 4 HOURS PRN
Status: DISCONTINUED | OUTPATIENT
Start: 2019-09-02 | End: 2019-09-04 | Stop reason: HOSPADM

## 2019-09-02 RX ADMIN — SODIUM CHLORIDE: 9 INJECTION, SOLUTION INTRAVENOUS at 13:00

## 2019-09-02 RX ADMIN — Medication 10 ML: at 09:03

## 2019-09-02 RX ADMIN — SODIUM CHLORIDE: 9 INJECTION, SOLUTION INTRAVENOUS at 04:03

## 2019-09-02 RX ADMIN — TAMSULOSIN HYDROCHLORIDE 0.4 MG: 0.4 CAPSULE ORAL at 21:41

## 2019-09-02 RX ADMIN — ENOXAPARIN SODIUM 70 MG: 80 INJECTION SUBCUTANEOUS at 21:41

## 2019-09-02 RX ADMIN — QUETIAPINE FUMARATE 50 MG: 50 TABLET ORAL at 21:41

## 2019-09-02 RX ADMIN — LEVOTHYROXINE SODIUM 137 MCG: 25 TABLET ORAL at 07:27

## 2019-09-02 RX ADMIN — SOTALOL HYDROCHLORIDE 80 MG: 80 TABLET ORAL at 09:03

## 2019-09-02 RX ADMIN — SOTALOL HYDROCHLORIDE 40 MG: 80 TABLET ORAL at 22:30

## 2019-09-02 RX ADMIN — ENOXAPARIN SODIUM 70 MG: 80 INJECTION SUBCUTANEOUS at 13:44

## 2019-09-02 RX ADMIN — ASPIRIN 81 MG 81 MG: 81 TABLET ORAL at 09:03

## 2019-09-02 RX ADMIN — LEVOTHYROXINE SODIUM 100 MCG: 0.1 TABLET ORAL at 07:26

## 2019-09-02 ASSESSMENT — PAIN SCALES - GENERAL
PAINLEVEL_OUTOF10: 0

## 2019-09-03 PROCEDURE — 2140000000 HC CCU INTERMEDIATE R&B

## 2019-09-03 PROCEDURE — 6370000000 HC RX 637 (ALT 250 FOR IP): Performed by: INTERNAL MEDICINE

## 2019-09-03 PROCEDURE — 99232 SBSQ HOSP IP/OBS MODERATE 35: CPT | Performed by: INTERNAL MEDICINE

## 2019-09-03 PROCEDURE — 2580000003 HC RX 258: Performed by: INTERNAL MEDICINE

## 2019-09-03 PROCEDURE — 6360000002 HC RX W HCPCS: Performed by: INTERNAL MEDICINE

## 2019-09-03 RX ADMIN — ENOXAPARIN SODIUM 70 MG: 80 INJECTION SUBCUTANEOUS at 08:41

## 2019-09-03 RX ADMIN — LEVOTHYROXINE SODIUM 100 MCG: 0.1 TABLET ORAL at 07:09

## 2019-09-03 RX ADMIN — QUETIAPINE FUMARATE 50 MG: 50 TABLET ORAL at 20:29

## 2019-09-03 RX ADMIN — LEVOTHYROXINE SODIUM 137 MCG: 25 TABLET ORAL at 07:06

## 2019-09-03 RX ADMIN — TAMSULOSIN HYDROCHLORIDE 0.4 MG: 0.4 CAPSULE ORAL at 20:29

## 2019-09-03 RX ADMIN — ASPIRIN 81 MG 81 MG: 81 TABLET ORAL at 08:40

## 2019-09-03 RX ADMIN — Medication 10 ML: at 20:30

## 2019-09-03 RX ADMIN — Medication 10 ML: at 08:41

## 2019-09-03 RX ADMIN — SOTALOL HYDROCHLORIDE 40 MG: 80 TABLET ORAL at 08:40

## 2019-09-03 RX ADMIN — ENOXAPARIN SODIUM 70 MG: 80 INJECTION SUBCUTANEOUS at 20:28

## 2019-09-03 ASSESSMENT — PAIN SCALES - GENERAL
PAINLEVEL_OUTOF10: 0

## 2019-09-04 VITALS
BODY MASS INDEX: 24.11 KG/M2 | DIASTOLIC BLOOD PRESSURE: 66 MMHG | OXYGEN SATURATION: 94 % | TEMPERATURE: 96.7 F | HEIGHT: 69 IN | HEART RATE: 50 BPM | WEIGHT: 162.8 LBS | SYSTOLIC BLOOD PRESSURE: 117 MMHG | RESPIRATION RATE: 16 BRPM

## 2019-09-04 PROCEDURE — 6370000000 HC RX 637 (ALT 250 FOR IP): Performed by: INTERNAL MEDICINE

## 2019-09-04 PROCEDURE — 2580000003 HC RX 258: Performed by: INTERNAL MEDICINE

## 2019-09-04 PROCEDURE — 93226 XTRNL ECG REC<48 HR SCAN A/R: CPT

## 2019-09-04 PROCEDURE — 6360000002 HC RX W HCPCS: Performed by: INTERNAL MEDICINE

## 2019-09-04 PROCEDURE — 99232 SBSQ HOSP IP/OBS MODERATE 35: CPT | Performed by: INTERNAL MEDICINE

## 2019-09-04 PROCEDURE — 93225 XTRNL ECG REC<48 HRS REC: CPT

## 2019-09-04 RX ORDER — ASPIRIN 81 MG/1
81 TABLET, CHEWABLE ORAL DAILY
Qty: 30 TABLET | Refills: 3 | Status: SHIPPED | OUTPATIENT
Start: 2019-09-05 | End: 2020-06-06

## 2019-09-04 RX ADMIN — LEVOTHYROXINE SODIUM 100 MCG: 0.1 TABLET ORAL at 06:30

## 2019-09-04 RX ADMIN — SOTALOL HYDROCHLORIDE 40 MG: 80 TABLET ORAL at 08:09

## 2019-09-04 RX ADMIN — Medication 10 ML: at 08:10

## 2019-09-04 RX ADMIN — ENOXAPARIN SODIUM 70 MG: 80 INJECTION SUBCUTANEOUS at 08:09

## 2019-09-04 RX ADMIN — ASPIRIN 81 MG 81 MG: 81 TABLET ORAL at 08:09

## 2019-09-04 RX ADMIN — LEVOTHYROXINE SODIUM 137 MCG: 25 TABLET ORAL at 06:30

## 2019-09-04 NOTE — PROGRESS NOTES
A fib still rate 110 will restart Cardizem at 5 mg/hr, patient not on anything po for a fib will titrate drip to rate.
Called Dr. Liliana Cabrera office to let her know patient is ok for d/c per cardiology standpoint, spoke to [facility].
Visited with pt to provide spiritual care. Pt had requested information about AD/LW. Provided documents for pt to review. Discussed documents with pt. Pt says he is supposed to have his procedure soon and may go home shortly after. Provided spiritual care with sustaining presence, and AD/LW documents. Pt expressed gratitude for spiritual care.       Electronically signed by Keagan Spear on 9/2/2019 at 11:50 AM
if stable      Discharge planning:    home     Reviewed treatment plans with the patient and/or family.              Electronically signed by Kingston Loja MD on 9/2/2019 at 4:54 PM

## 2019-09-12 ENCOUNTER — HOSPITAL ENCOUNTER (OUTPATIENT)
Dept: NON INVASIVE DIAGNOSTICS | Age: 69
Discharge: HOME | End: 2019-09-12
Payer: MEDICARE

## 2019-09-12 PROCEDURE — 93971 EXTREMITY STUDY: CPT

## 2019-09-19 ENCOUNTER — OFFICE VISIT (OUTPATIENT)
Dept: CARDIOLOGY | Age: 69
End: 2019-09-19
Payer: MEDICARE

## 2019-09-19 VITALS
HEIGHT: 69 IN | BODY MASS INDEX: 23.99 KG/M2 | SYSTOLIC BLOOD PRESSURE: 116 MMHG | HEART RATE: 58 BPM | DIASTOLIC BLOOD PRESSURE: 68 MMHG | WEIGHT: 162 LBS

## 2019-09-19 DIAGNOSIS — E03.8 OTHER SPECIFIED HYPOTHYROIDISM: ICD-10-CM

## 2019-09-19 DIAGNOSIS — R06.02 SHORTNESS OF BREATH: ICD-10-CM

## 2019-09-19 DIAGNOSIS — R00.1 BRADYCARDIA: ICD-10-CM

## 2019-09-19 DIAGNOSIS — R53.83 OTHER FATIGUE: ICD-10-CM

## 2019-09-19 DIAGNOSIS — I48.91 ATRIAL FIBRILLATION WITH RVR (HCC): Primary | ICD-10-CM

## 2019-09-19 PROCEDURE — G8420 CALC BMI NORM PARAMETERS: HCPCS | Performed by: CLINICAL NURSE SPECIALIST

## 2019-09-19 PROCEDURE — 4040F PNEUMOC VAC/ADMIN/RCVD: CPT | Performed by: CLINICAL NURSE SPECIALIST

## 2019-09-19 PROCEDURE — 1036F TOBACCO NON-USER: CPT | Performed by: CLINICAL NURSE SPECIALIST

## 2019-09-19 PROCEDURE — 1111F DSCHRG MED/CURRENT MED MERGE: CPT | Performed by: CLINICAL NURSE SPECIALIST

## 2019-09-19 PROCEDURE — 99213 OFFICE O/P EST LOW 20 MIN: CPT | Performed by: CLINICAL NURSE SPECIALIST

## 2019-09-19 PROCEDURE — 3017F COLORECTAL CA SCREEN DOC REV: CPT | Performed by: CLINICAL NURSE SPECIALIST

## 2019-09-19 PROCEDURE — 1123F ACP DISCUSS/DSCN MKR DOCD: CPT | Performed by: CLINICAL NURSE SPECIALIST

## 2019-09-19 PROCEDURE — G8427 DOCREV CUR MEDS BY ELIG CLIN: HCPCS | Performed by: CLINICAL NURSE SPECIALIST

## 2019-09-19 ASSESSMENT — ENCOUNTER SYMPTOMS
VOMITING: 0
NAUSEA: 0
SHORTNESS OF BREATH: 0
EYE REDNESS: 0
CHEST TIGHTNESS: 0
FACIAL SWELLING: 0
ABDOMINAL PAIN: 0
COUGH: 0
WHEEZING: 0

## 2019-09-19 NOTE — PROGRESS NOTES
reheumatic fever- valve issue    Colon Cancer Neg Hx     Colon Polyps Neg Hx      Social History     Tobacco Use    Smoking status: Never Smoker    Smokeless tobacco: Never Used   Substance Use Topics    Alcohol use: Yes     Alcohol/week: 3.0 standard drinks     Types: 3 Glasses of wine per week      Current Outpatient Medications   Medication Sig Dispense Refill    aspirin 81 MG chewable tablet Take 1 tablet by mouth daily 30 tablet 3    tamsulosin (FLOMAX) 0.4 MG capsule Take 0.4 mg by mouth nightly       Cholecalciferol (VITAMIN D) 2000 units CAPS capsule Take 2,000 Units by mouth daily      diclofenac sodium 1 % GEL Apply 2 g topically 4 times daily      QUEtiapine (SEROQUEL) 50 MG tablet Take 50 mg by mouth nightly.  levothyroxine (SYNTHROID) 137 MCG tablet Take 137 mcg by mouth Daily Takes 237 mcg total       No current facility-administered medications for this visit. Allergies: Morphine    Review of Systems  Review of Systems   Constitutional: Positive for fatigue. Negative for activity change, diaphoresis, fever and unexpected weight change. HENT: Negative for facial swelling and nosebleeds. Eyes: Negative for redness and visual disturbance. Respiratory: Negative for cough, chest tightness, shortness of breath and wheezing. Cardiovascular: Negative for chest pain, palpitations and leg swelling. Gastrointestinal: Negative for abdominal pain, nausea and vomiting. Endocrine: Negative for cold intolerance and heat intolerance. Genitourinary: Negative for dysuria and hematuria. Musculoskeletal: Negative for arthralgias and myalgias. Skin: Negative for pallor and rash. Neurological: Negative for dizziness, seizures, syncope, weakness and light-headedness. Hematological: Does not bruise/bleed easily. Psychiatric/Behavioral: Negative for agitation. The patient is not nervous/anxious.         Objective  Vital Signs - /68   Pulse 58   Ht 5' 9\" (1.753 m)

## 2019-10-07 ENCOUNTER — HOSPITAL ENCOUNTER (OUTPATIENT)
Dept: NON INVASIVE DIAGNOSTICS | Age: 69
Discharge: HOME | End: 2019-10-07
Payer: MEDICARE

## 2019-10-07 DIAGNOSIS — R06.02 SHORTNESS OF BREATH: ICD-10-CM

## 2019-10-07 DIAGNOSIS — R53.83 OTHER FATIGUE: ICD-10-CM

## 2019-10-07 DIAGNOSIS — I48.91 ATRIAL FIBRILLATION WITH RVR (HCC): ICD-10-CM

## 2019-10-07 LAB
LV EF: 50 %
LVEF MODALITY: NORMAL

## 2019-10-07 PROCEDURE — 93350 STRESS TTE ONLY: CPT

## 2019-10-24 ENCOUNTER — OFFICE VISIT (OUTPATIENT)
Dept: CARDIOLOGY | Age: 69
End: 2019-10-24
Payer: MEDICARE

## 2019-10-24 VITALS
WEIGHT: 170 LBS | BODY MASS INDEX: 25.18 KG/M2 | SYSTOLIC BLOOD PRESSURE: 142 MMHG | HEART RATE: 53 BPM | HEIGHT: 69 IN | DIASTOLIC BLOOD PRESSURE: 72 MMHG

## 2019-10-24 DIAGNOSIS — R00.1 BRADYCARDIA: ICD-10-CM

## 2019-10-24 DIAGNOSIS — I48.0 PAROXYSMAL ATRIAL FIBRILLATION (HCC): ICD-10-CM

## 2019-10-24 DIAGNOSIS — I48.91 ATRIAL FIBRILLATION WITH RVR (HCC): Primary | ICD-10-CM

## 2019-10-24 DIAGNOSIS — I95.1 ORTHOSTASIS: ICD-10-CM

## 2019-10-24 PROCEDURE — 3017F COLORECTAL CA SCREEN DOC REV: CPT | Performed by: INTERNAL MEDICINE

## 2019-10-24 PROCEDURE — G8417 CALC BMI ABV UP PARAM F/U: HCPCS | Performed by: INTERNAL MEDICINE

## 2019-10-24 PROCEDURE — 4040F PNEUMOC VAC/ADMIN/RCVD: CPT | Performed by: INTERNAL MEDICINE

## 2019-10-24 PROCEDURE — 93000 ELECTROCARDIOGRAM COMPLETE: CPT | Performed by: INTERNAL MEDICINE

## 2019-10-24 PROCEDURE — 99213 OFFICE O/P EST LOW 20 MIN: CPT | Performed by: INTERNAL MEDICINE

## 2019-10-24 PROCEDURE — G8427 DOCREV CUR MEDS BY ELIG CLIN: HCPCS | Performed by: INTERNAL MEDICINE

## 2019-10-24 PROCEDURE — G8484 FLU IMMUNIZE NO ADMIN: HCPCS | Performed by: INTERNAL MEDICINE

## 2019-10-24 PROCEDURE — 1123F ACP DISCUSS/DSCN MKR DOCD: CPT | Performed by: INTERNAL MEDICINE

## 2019-10-24 PROCEDURE — 1036F TOBACCO NON-USER: CPT | Performed by: INTERNAL MEDICINE

## 2019-10-24 ASSESSMENT — ENCOUNTER SYMPTOMS
SHORTNESS OF BREATH: 0
NAUSEA: 0
RESPIRATORY NEGATIVE: 1
DIARRHEA: 0
VOMITING: 0
EYES NEGATIVE: 1
GASTROINTESTINAL NEGATIVE: 1

## 2020-02-17 LAB
ALBUMIN SERPL-MCNC: 4.4 G/DL (ref 3.5–5.2)
ALP BLD-CCNC: 56 U/L (ref 40–130)
ALT SERPL-CCNC: 12 U/L (ref 5–41)
ANION GAP SERPL CALCULATED.3IONS-SCNC: 12 MMOL/L (ref 7–19)
AST SERPL-CCNC: 18 U/L (ref 5–40)
BASOPHILS ABSOLUTE: 0.1 K/UL (ref 0–0.2)
BASOPHILS RELATIVE PERCENT: 1.7 % (ref 0–1)
BILIRUB SERPL-MCNC: 1.6 MG/DL (ref 0.2–1.2)
BUN BLDV-MCNC: 9 MG/DL (ref 8–23)
CALCIUM SERPL-MCNC: 8.4 MG/DL (ref 8.8–10.2)
CHLORIDE BLD-SCNC: 101 MMOL/L (ref 98–111)
CHOLESTEROL, TOTAL: 192 MG/DL (ref 160–199)
CO2: 28 MMOL/L (ref 22–29)
CREAT SERPL-MCNC: 0.6 MG/DL (ref 0.5–1.2)
EOSINOPHILS ABSOLUTE: 0.2 K/UL (ref 0–0.6)
EOSINOPHILS RELATIVE PERCENT: 5.6 % (ref 0–5)
GFR NON-AFRICAN AMERICAN: >60
GLUCOSE BLD-MCNC: 94 MG/DL (ref 74–109)
HCT VFR BLD CALC: 42.8 % (ref 42–52)
HDLC SERPL-MCNC: 79 MG/DL (ref 55–121)
HEMOGLOBIN: 14.5 G/DL (ref 14–18)
IMMATURE GRANULOCYTES #: 0 K/UL
LDL CHOLESTEROL CALCULATED: 100 MG/DL
LYMPHOCYTES ABSOLUTE: 0.8 K/UL (ref 1.1–4.5)
LYMPHOCYTES RELATIVE PERCENT: 26.7 % (ref 20–40)
MCH RBC QN AUTO: 31.8 PG (ref 27–31)
MCHC RBC AUTO-ENTMCNC: 33.9 G/DL (ref 33–37)
MCV RBC AUTO: 93.9 FL (ref 80–94)
MONOCYTES ABSOLUTE: 0.4 K/UL (ref 0–0.9)
MONOCYTES RELATIVE PERCENT: 12.8 % (ref 0–10)
NEUTROPHILS ABSOLUTE: 1.5 K/UL (ref 1.5–7.5)
NEUTROPHILS RELATIVE PERCENT: 52.9 % (ref 50–65)
PDW BLD-RTO: 12.6 % (ref 11.5–14.5)
PLATELET # BLD: 187 K/UL (ref 130–400)
PMV BLD AUTO: 10.4 FL (ref 9.4–12.4)
POTASSIUM SERPL-SCNC: 4.1 MMOL/L (ref 3.5–5)
PROSTATE SPECIFIC ANTIGEN: 0.26 NG/ML (ref 0–4)
RBC # BLD: 4.56 M/UL (ref 4.7–6.1)
RHEUMATOID FACTOR: <10 IU/ML
SEDIMENTATION RATE, ERYTHROCYTE: 5 MM/HR (ref 0–15)
SODIUM BLD-SCNC: 141 MMOL/L (ref 136–145)
T4 FREE: 1.48 NG/DL (ref 0.93–1.7)
TOTAL PROTEIN: 6.6 G/DL (ref 6.6–8.7)
TRIGL SERPL-MCNC: 63 MG/DL (ref 0–149)
TSH SERPL DL<=0.05 MIU/L-ACNC: 0.34 UIU/ML (ref 0.27–4.2)
WBC # BLD: 2.9 K/UL (ref 4.8–10.8)

## 2020-02-24 ENCOUNTER — APPOINTMENT (OUTPATIENT)
Dept: GENERAL RADIOLOGY | Age: 70
End: 2020-02-24
Payer: MEDICARE

## 2020-02-24 ENCOUNTER — NURSE TRIAGE (OUTPATIENT)
Dept: OTHER | Facility: CLINIC | Age: 70
End: 2020-02-24

## 2020-02-24 ENCOUNTER — HOSPITAL ENCOUNTER (EMERGENCY)
Age: 70
Discharge: HOME | End: 2020-02-24
Attending: PEDIATRICS
Payer: MEDICARE

## 2020-02-24 VITALS
DIASTOLIC BLOOD PRESSURE: 72 MMHG | RESPIRATION RATE: 18 BRPM | HEIGHT: 68 IN | HEART RATE: 62 BPM | WEIGHT: 162 LBS | OXYGEN SATURATION: 96 % | BODY MASS INDEX: 24.55 KG/M2 | SYSTOLIC BLOOD PRESSURE: 152 MMHG | TEMPERATURE: 98.1 F

## 2020-02-24 LAB
ALBUMIN SERPL-MCNC: 4.4 G/DL (ref 3.5–5.2)
ALP BLD-CCNC: 53 U/L (ref 40–130)
ALT SERPL-CCNC: 14 U/L (ref 5–41)
ANION GAP SERPL CALCULATED.3IONS-SCNC: 13 MMOL/L (ref 7–19)
AST SERPL-CCNC: 22 U/L (ref 5–40)
BASOPHILS ABSOLUTE: 0.1 K/UL (ref 0–0.2)
BASOPHILS RELATIVE PERCENT: 1.2 % (ref 0–1)
BILIRUB SERPL-MCNC: 0.8 MG/DL (ref 0.2–1.2)
BILIRUBIN URINE: NEGATIVE
BLOOD, URINE: NEGATIVE
BUN BLDV-MCNC: 12 MG/DL (ref 8–23)
CALCIUM SERPL-MCNC: 8.3 MG/DL (ref 8.8–10.2)
CHLORIDE BLD-SCNC: 99 MMOL/L (ref 98–111)
CLARITY: CLEAR
CO2: 25 MMOL/L (ref 22–29)
COLOR: YELLOW
CREAT SERPL-MCNC: 0.7 MG/DL (ref 0.5–1.2)
D DIMER: 0.27 UG/ML FEU (ref 0–0.48)
EOSINOPHILS ABSOLUTE: 0.2 K/UL (ref 0–0.6)
EOSINOPHILS RELATIVE PERCENT: 3.5 % (ref 0–5)
GFR NON-AFRICAN AMERICAN: >60
GLUCOSE BLD-MCNC: 110 MG/DL (ref 74–109)
GLUCOSE URINE: NEGATIVE MG/DL
HCT VFR BLD CALC: 42.2 % (ref 42–52)
HEMOGLOBIN: 14.5 G/DL (ref 14–18)
IMMATURE GRANULOCYTES #: 0 K/UL
KETONES, URINE: NEGATIVE MG/DL
LEUKOCYTE ESTERASE, URINE: NEGATIVE
LIPASE: 47 U/L (ref 13–60)
LYMPHOCYTES ABSOLUTE: 0.8 K/UL (ref 1.1–4.5)
LYMPHOCYTES RELATIVE PERCENT: 18.1 % (ref 20–40)
MCH RBC QN AUTO: 31.9 PG (ref 27–31)
MCHC RBC AUTO-ENTMCNC: 34.4 G/DL (ref 33–37)
MCV RBC AUTO: 92.7 FL (ref 80–94)
MONOCYTES ABSOLUTE: 0.4 K/UL (ref 0–0.9)
MONOCYTES RELATIVE PERCENT: 8.6 % (ref 0–10)
NEUTROPHILS ABSOLUTE: 2.9 K/UL (ref 1.5–7.5)
NEUTROPHILS RELATIVE PERCENT: 68.4 % (ref 50–65)
NITRITE, URINE: NEGATIVE
PDW BLD-RTO: 12.5 % (ref 11.5–14.5)
PH UA: 7.5 (ref 5–8)
PLATELET # BLD: 216 K/UL (ref 130–400)
PMV BLD AUTO: 10.8 FL (ref 9.4–12.4)
POTASSIUM REFLEX MAGNESIUM: 4 MMOL/L (ref 3.5–5)
PRO-BNP: 141 PG/ML (ref 0–900)
PROTEIN UA: NEGATIVE MG/DL
RBC # BLD: 4.55 M/UL (ref 4.7–6.1)
SODIUM BLD-SCNC: 137 MMOL/L (ref 136–145)
SPECIFIC GRAVITY UA: 1 (ref 1–1.03)
TOTAL PROTEIN: 6.7 G/DL (ref 6.6–8.7)
TROPONIN: <0.01 NG/ML (ref 0–0.03)
TSH SERPL DL<=0.05 MIU/L-ACNC: 0.32 UIU/ML (ref 0.27–4.2)
URINE REFLEX TO CULTURE: NORMAL
UROBILINOGEN, URINE: 0.2 E.U./DL
WBC # BLD: 4.3 K/UL (ref 4.8–10.8)

## 2020-02-24 PROCEDURE — 80053 COMPREHEN METABOLIC PANEL: CPT

## 2020-02-24 PROCEDURE — 83880 ASSAY OF NATRIURETIC PEPTIDE: CPT

## 2020-02-24 PROCEDURE — 99285 EMERGENCY DEPT VISIT HI MDM: CPT

## 2020-02-24 PROCEDURE — 36415 COLL VENOUS BLD VENIPUNCTURE: CPT

## 2020-02-24 PROCEDURE — 84484 ASSAY OF TROPONIN QUANT: CPT

## 2020-02-24 PROCEDURE — 93005 ELECTROCARDIOGRAM TRACING: CPT | Performed by: PEDIATRICS

## 2020-02-24 PROCEDURE — 85025 COMPLETE CBC W/AUTO DIFF WBC: CPT

## 2020-02-24 PROCEDURE — 93225 XTRNL ECG REC<48 HRS REC: CPT

## 2020-02-24 PROCEDURE — 81003 URINALYSIS AUTO W/O SCOPE: CPT

## 2020-02-24 PROCEDURE — 71045 X-RAY EXAM CHEST 1 VIEW: CPT

## 2020-02-24 PROCEDURE — 93229 REMOTE 30 DAY ECG TECH SUPP: CPT

## 2020-02-24 PROCEDURE — 84443 ASSAY THYROID STIM HORMONE: CPT

## 2020-02-24 PROCEDURE — 83690 ASSAY OF LIPASE: CPT

## 2020-02-24 PROCEDURE — 85379 FIBRIN DEGRADATION QUANT: CPT

## 2020-02-24 PROCEDURE — 6370000000 HC RX 637 (ALT 250 FOR IP): Performed by: PEDIATRICS

## 2020-02-24 RX ORDER — ASPIRIN 81 MG/1
324 TABLET, CHEWABLE ORAL ONCE
Status: COMPLETED | OUTPATIENT
Start: 2020-02-24 | End: 2020-02-24

## 2020-02-24 RX ADMIN — ASPIRIN 324 MG: 81 TABLET, CHEWABLE ORAL at 17:11

## 2020-02-24 ASSESSMENT — PAIN DESCRIPTION - LOCATION: LOCATION: CHEST;ARM

## 2020-02-24 ASSESSMENT — PAIN DESCRIPTION - ORIENTATION: ORIENTATION: LEFT

## 2020-02-24 ASSESSMENT — ENCOUNTER SYMPTOMS
BLOOD IN STOOL: 0
SHORTNESS OF BREATH: 1
VOMITING: 0
ABDOMINAL PAIN: 0
BACK PAIN: 1
RHINORRHEA: 0
NAUSEA: 0
COUGH: 0

## 2020-02-24 ASSESSMENT — PAIN SCALES - GENERAL: PAINLEVEL_OUTOF10: 8

## 2020-02-24 NOTE — ED PROVIDER NOTES
[facility]  eMERGENCY dEPARTMENT eNCOUnter      Pt Name: Samantha Schulte  MRN: [id number]  [place] 1950  Date of evaluation: 2/24/2020  Provider: Lizeth Tracy MD    CHIEF COMPLAINT       Chief Complaint   Patient presents with    Chest Pain     Feels like his heart is out of rhythm, Hx of afib         HISTORY OF PRESENT ILLNESS   (Location/Symptom, Timing/Onset,Context/Setting, Quality, Duration, Modifying Factors, Severity)  Note limiting factors. Samantha Schulte is a 78 y.o. male who presents to the emergency department with patients. States that he feels \"like I felt on Labor Day when I had atrial fibrillation. \"  States when he feels the palpitations he takes his pulse and it usually in the 60s. Patient cannot tell if it is irregular. States that his blood pressure monitor has signaled that he has an irregular pulse at 1 time. Is also been experiencing shortness of breath for \"several weeks\". Patient states he gets worse when he takes a deep breath. Patient states that he is fatigued\" a little lightheaded\" all the time. Patient states the lightheadedness does not change when he feels the shortness of breath or palpitations. Patient has not experienced syncope. Adamantly denies chest pain or discomfort. Patient has been having discomfort in his left neck that radiates down to his mid back. Patient is on no blood thinners with the exception of a baby aspirin daily. He is on no medications to limit heart rate. Patient had a stress test in September 2019 which was negative and an echocardiogram during the same month which was normal.  Patient denies nausea, vomiting, diaphoresis, lower extremity swelling or pain. HPI    NursingNotes were reviewed. REVIEW OF SYSTEMS    (2-9 systems for level 4, 10 or more for level 5)     Review of Systems   Constitutional: Negative for chills and fever. HENT: Negative for congestion and rhinorrhea.     Respiratory: Positive for shortness of breath. Negative for cough. Cardiovascular: Positive for palpitations. Negative for chest pain and leg swelling. Gastrointestinal: Negative for abdominal pain, blood in stool, nausea and vomiting. Genitourinary: Negative for dysuria and urgency. Musculoskeletal: Positive for back pain and neck pain. Neurological: Positive for light-headedness. Negative for syncope. All other systems reviewed and are negative. PAST MEDICALHISTORY     Past Medical History:   Diagnosis Date    Bipolar disorder ([address]     Colon polyps     Hypothyroidism     Insomnia          SURGICAL HISTORY       Past Surgical History:   Procedure Laterality Date    CARPAL TUNNEL RELEASE Right     approx 5 years ago    CLAVICLE SURGERY      x 2    COLONOSCOPY  ? 5-6 years ago        FOOT SURGERY      x 2    HERNIA REPAIR      JOINT REPLACEMENT Right     hip    THYROIDECTOMY      VARICOSE VEIN SURGERY      VASECTOMY           CURRENT MEDICATIONS     Previous Medications    ASPIRIN 81 MG CHEWABLE TABLET    Take 1 tablet by mouth daily    CHOLECALCIFEROL (VITAMIN D) 2000 UNITS CAPS CAPSULE    Take 2,000 Units by mouth daily    DICLOFENAC SODIUM 1 % GEL    Apply 2 g topically 4 times daily    LEVOTHYROXINE (SYNTHROID) 137 MCG TABLET    Take 137 mcg by mouth Daily Takes 237 mcg total    QUETIAPINE (SEROQUEL) 50 MG TABLET    Take 50 mg by mouth nightly.     TAMSULOSIN (FLOMAX) 0.4 MG CAPSULE    Take 0.4 mg by mouth nightly        ALLERGIES     Morphine    FAMILY HISTORY       Family History   Problem Relation Age of Onset    Dementia Mother     Obesity Mother     Stroke Father     Heart Attack Father     Other Sister     Other Brother         reheumatic fever- valve issue    Colon Cancer Neg Hx     Colon Polyps Neg Hx           SOCIAL HISTORY       Social History     Socioeconomic History    Marital status: Single     Spouse name: Not on file    Number of children: Not on file    Years of education: Not on file    Highest education level: Not on file   Occupational History    Not on file   Social Needs    Financial resource strain: Not on file    Food insecurity:     Worry: Not on file     Inability: Not on file    Transportation needs:     Medical: Not on file     Non-medical: Not on file   Tobacco Use    Smoking status: Never Smoker    Smokeless tobacco: Never Used   Substance and Sexual Activity    Alcohol use: Yes     Alcohol/week: 3.0 standard drinks     Types: 3 Glasses of wine per week    Drug use: No    Sexual activity: Not on file   Lifestyle    Physical activity:     Days per week: Not on file     Minutes per session: Not on file    Stress: Not on file   Relationships    Social connections:     Talks on phone: Not on file     Gets together: Not on file     Attends Hinduism service: Not on file     Active member of club or organization: Not on file     Attends meetings of clubs or organizations: Not on file     Relationship status: Not on file    Intimate partner violence:     Fear of current or ex partner: Not on file     Emotionally abused: Not on file     Physically abused: Not on file     Forced sexual activity: Not on file   Other Topics Concern    Not on file   Social History Narrative    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)    Here with his significant other    Previously  and     He has 1 son and 1 daughter    Education masters degree in fine arts    Former distance runner also bicyclist not so much these days    Smoked minimally 3 to 4 years total many years ago denies alcohol consumption or substance usage       SCREENINGS             PHYSICAL EXAM    (up to 7 for level 4, 8 or more for level 5)     ED Triage Vitals [02/24/20 1407]   BP Temp Temp Source Pulse Resp SpO2 Height Weight   (!) 187/87 98.1 °F (36.7 °C) Oral 62 18 94 % 5' 8\" (1.727 m) 162 lb (73.5 kg)       Physical Exam  Vitals signs and nursing note reviewed. Constitutional:       Appearance: Normal appearance. HENT:      Head: Normocephalic and atraumatic. Right Ear: External ear normal.      Left Ear: External ear normal.      Nose: Nose normal.      Mouth/Throat:      Mouth: Mucous membranes are moist.      Pharynx: Oropharynx is clear. No oropharyngeal exudate. Eyes:      General: No scleral icterus. Conjunctiva/sclera: Conjunctivae normal.      Pupils: Pupils are equal, round, and reactive to light. Neck:      Musculoskeletal: Neck supple. Muscular tenderness (Tenderness and spasm in the left trapezius distribution.) present. No neck rigidity. Cardiovascular:      Rate and Rhythm: Normal rate and regular rhythm. Pulses: Normal pulses. Heart sounds: Normal heart sounds. Pulmonary:      Effort: Pulmonary effort is normal.      Breath sounds: Normal breath sounds. Abdominal:      General: Bowel sounds are normal.      Palpations: Abdomen is soft. Tenderness: There is no abdominal tenderness. There is no guarding. Musculoskeletal:         General: No deformity. Right lower leg: No edema. Left lower leg: No edema. Skin:     General: Skin is warm and dry. Capillary Refill: Capillary refill takes less than 2 seconds. Coloration: Skin is not jaundiced. Neurological:      Mental Status: He is alert and oriented to person, place, and time. Mental status is at baseline. Motor: No weakness. Coordination: Coordination normal.   Psychiatric:         Mood and Affect: Mood normal.         Behavior: Behavior normal.         DIAGNOSTIC RESULTS     EKG: All EKG's areinterpreted by the Emergency Department Physician who either signs or Co-signs this chart in the absence of a cardiologist.    KG dated 2/24/2020 at 2:09 PM: Normal sinus rhythm with rate of 63 bpm.  1 premature atrial complex is seen. Otherwise normal EKG.     RADIOLOGY:  Non-plain film images such as CT, Ultrasound and MRI are read by the

## 2020-02-25 ENCOUNTER — TELEPHONE (OUTPATIENT)
Dept: CARDIOLOGY | Age: 70
End: 2020-02-25

## 2020-02-25 LAB
EKG P AXIS: 69 DEGREES
EKG P-R INTERVAL: 170 MS
EKG Q-T INTERVAL: 454 MS
EKG QRS DURATION: 100 MS
EKG QTC CALCULATION (BAZETT): 462 MS
EKG T AXIS: 44 DEGREES

## 2020-02-25 PROCEDURE — 93010 ELECTROCARDIOGRAM REPORT: CPT | Performed by: INTERNAL MEDICINE

## 2020-02-25 NOTE — TELEPHONE ENCOUNTER
Anurag Dinh called to let Dr. Ferny Mcdonnell know that the pt went to ED yesterday. The pt thought that he was having a Afib event. The pt was not having an Afib event. The pt is on a heart monitor for two weeks.

## 2020-03-14 ENCOUNTER — HOSPITAL ENCOUNTER (OUTPATIENT)
Dept: LAB | Age: 70
Discharge: HOME | End: 2020-03-14
Payer: MEDICARE

## 2020-03-14 ENCOUNTER — OFFICE VISIT (OUTPATIENT)
Dept: URGENT CARE | Age: 70
End: 2020-03-14
Payer: MEDICARE

## 2020-03-14 VITALS
DIASTOLIC BLOOD PRESSURE: 76 MMHG | HEART RATE: 56 BPM | RESPIRATION RATE: 18 BRPM | WEIGHT: 165 LBS | SYSTOLIC BLOOD PRESSURE: 138 MMHG | HEIGHT: 69 IN | TEMPERATURE: 98.2 F | BODY MASS INDEX: 24.44 KG/M2 | OXYGEN SATURATION: 97 %

## 2020-03-14 LAB
BASOPHILS ABSOLUTE: 0 K/UL (ref 0–0.2)
BASOPHILS RELATIVE PERCENT: 0.8 % (ref 0–1)
EOSINOPHILS ABSOLUTE: 0.2 K/UL (ref 0–0.6)
EOSINOPHILS RELATIVE PERCENT: 3.1 % (ref 0–5)
HCT VFR BLD CALC: 42.6 % (ref 42–52)
HEMOGLOBIN: 14.7 G/DL (ref 14–18)
IMMATURE GRANULOCYTES #: 0 K/UL
LYMPHOCYTES ABSOLUTE: 0.8 K/UL (ref 1.1–4.5)
LYMPHOCYTES RELATIVE PERCENT: 16.2 % (ref 20–40)
MCH RBC QN AUTO: 31.7 PG (ref 27–31)
MCHC RBC AUTO-ENTMCNC: 34.5 G/DL (ref 33–37)
MCV RBC AUTO: 91.8 FL (ref 80–94)
MONOCYTES ABSOLUTE: 0.5 K/UL (ref 0–0.9)
MONOCYTES RELATIVE PERCENT: 9.5 % (ref 0–10)
NEUTROPHILS ABSOLUTE: 3.6 K/UL (ref 1.5–7.5)
NEUTROPHILS RELATIVE PERCENT: 70.2 % (ref 50–65)
PDW BLD-RTO: 12.5 % (ref 11.5–14.5)
PLATELET # BLD: 221 K/UL (ref 130–400)
PMV BLD AUTO: 10.3 FL (ref 9.4–12.4)
RBC # BLD: 4.64 M/UL (ref 4.7–6.1)
URIC ACID, SERUM: 3.1 MG/DL (ref 3.4–7)
WBC # BLD: 5.2 K/UL (ref 4.8–10.8)

## 2020-03-14 PROCEDURE — G8427 DOCREV CUR MEDS BY ELIG CLIN: HCPCS | Performed by: NURSE PRACTITIONER

## 2020-03-14 PROCEDURE — 99213 OFFICE O/P EST LOW 20 MIN: CPT | Performed by: NURSE PRACTITIONER

## 2020-03-14 PROCEDURE — 4040F PNEUMOC VAC/ADMIN/RCVD: CPT | Performed by: NURSE PRACTITIONER

## 2020-03-14 PROCEDURE — G8484 FLU IMMUNIZE NO ADMIN: HCPCS | Performed by: NURSE PRACTITIONER

## 2020-03-14 PROCEDURE — 1123F ACP DISCUSS/DSCN MKR DOCD: CPT | Performed by: NURSE PRACTITIONER

## 2020-03-14 PROCEDURE — 1036F TOBACCO NON-USER: CPT | Performed by: NURSE PRACTITIONER

## 2020-03-14 PROCEDURE — 3017F COLORECTAL CA SCREEN DOC REV: CPT | Performed by: NURSE PRACTITIONER

## 2020-03-14 PROCEDURE — G8420 CALC BMI NORM PARAMETERS: HCPCS | Performed by: NURSE PRACTITIONER

## 2020-03-14 RX ORDER — CLINDAMYCIN HYDROCHLORIDE 300 MG/1
300 CAPSULE ORAL 3 TIMES DAILY
Qty: 30 CAPSULE | Refills: 0 | Status: SHIPPED | OUTPATIENT
Start: 2020-03-14 | End: 2020-03-24

## 2020-03-14 NOTE — PROGRESS NOTES
[address] [facility]  [address] [facility] [address]  [address] [place] [address]  Dept: [phone]  Loc: [phone]    Susannah Gayle is a 78 y.o. male who presents today for his medical conditions/complaintsas noted below. Susannah Gayle is c/o of Toe Pain (RIGHT foot for a couple of days )        HPI:     HPI  Kavitha Buchanan presents today with a complaint of toe pain on his right foot. Started a few days ago. It is his great toe, second toe, and third toe. 2nd toe is swollen. Painful to touch. He denies injury. He denies history of gout. Painful to put shoes on. Pain 8/10 currently. He has not done anything for the symptoms. Past Medical History:   Diagnosis Date    Bipolar disorder ([address]     Colon polyps     Hypothyroidism     Insomnia      Past Surgical History:   Procedure Laterality Date    CARPAL TUNNEL RELEASE Right     approx 5 years ago    CLAVICLE SURGERY      x 2    COLONOSCOPY  ? 5-6 years ago        FOOT SURGERY      x 2    HERNIA REPAIR      JOINT REPLACEMENT Right     hip    THYROIDECTOMY      VARICOSE VEIN SURGERY      VASECTOMY         Family History   Problem Relation Age of Onset    Dementia Mother     Obesity Mother     Stroke Father     Heart Attack Father     Other Sister     Other Brother         reheumatic fever- valve issue    Colon Cancer Neg Hx     Colon Polyps Neg Hx        Social History     Tobacco Use    Smoking status: Never Smoker    Smokeless tobacco: Never Used   Substance Use Topics    Alcohol use:  Yes     Alcohol/week: 3.0 standard drinks     Types: 3 Glasses of wine per week      Current Outpatient Medications   Medication Sig Dispense Refill    ZINC PO Take by mouth      VITAMIN E PO Take by mouth      aspirin 81 MG chewable tablet Take 1 tablet by mouth daily 30 tablet 3    tamsulosin (FLOMAX) 0.4 MG capsule Take 0.4 mg by mouth nightly       Cholecalciferol (VITAMIN D) 2000 units CAPS capsule Take 2,000 Psychiatric:         Behavior: Behavior normal.       /76   Pulse 56   Temp 98.2 °F (36.8 °C)   Resp 18   Ht 5' 9\" (1.753 m)   Wt 165 lb (74.8 kg)   SpO2 97%   BMI 24.37 kg/m²     :Assessment       Diagnosis Orders   1. Pain in toe of right foot  Uric Acid    CBC Auto Differential   2. Swelling of toe of right foot  Uric Acid    CBC Auto Differential       :Plan    Suspect gout or cellulitis. Ordering a CBC and a uric acid. If positive for elevated uric acid will treat for gout. If not will treat for cellulitis. We will call him with results and plan. Orders Placed This Encounter   Procedures    Uric Acid    CBC Auto Differential   No results found for this visit on 03/14/20. No follow-ups on file. No orders of the defined types were placed in this encounter. Patient given educational materials- see patient instructions. Discussed use, benefit, and side effects of prescribedmedications. All patient questions answered. Pt voiced understanding. There are no Patient Instructions on file for this visit.       Electronically signed by JOHNSON Liang on 3/14/2020 at 12:56 PM

## 2020-03-23 ENCOUNTER — OFFICE VISIT (OUTPATIENT)
Dept: CARDIOLOGY | Age: 70
End: 2020-03-23
Payer: MEDICARE

## 2020-03-23 VITALS
DIASTOLIC BLOOD PRESSURE: 78 MMHG | HEIGHT: 69 IN | BODY MASS INDEX: 24.88 KG/M2 | WEIGHT: 168 LBS | SYSTOLIC BLOOD PRESSURE: 120 MMHG

## 2020-03-23 PROCEDURE — 3017F COLORECTAL CA SCREEN DOC REV: CPT | Performed by: INTERNAL MEDICINE

## 2020-03-23 PROCEDURE — G8427 DOCREV CUR MEDS BY ELIG CLIN: HCPCS | Performed by: INTERNAL MEDICINE

## 2020-03-23 PROCEDURE — 1036F TOBACCO NON-USER: CPT | Performed by: INTERNAL MEDICINE

## 2020-03-23 PROCEDURE — 99214 OFFICE O/P EST MOD 30 MIN: CPT | Performed by: INTERNAL MEDICINE

## 2020-03-23 PROCEDURE — 4040F PNEUMOC VAC/ADMIN/RCVD: CPT | Performed by: INTERNAL MEDICINE

## 2020-03-23 PROCEDURE — 1123F ACP DISCUSS/DSCN MKR DOCD: CPT | Performed by: INTERNAL MEDICINE

## 2020-03-23 PROCEDURE — G8484 FLU IMMUNIZE NO ADMIN: HCPCS | Performed by: INTERNAL MEDICINE

## 2020-03-23 PROCEDURE — G8420 CALC BMI NORM PARAMETERS: HCPCS | Performed by: INTERNAL MEDICINE

## 2020-03-23 PROCEDURE — 93000 ELECTROCARDIOGRAM COMPLETE: CPT | Performed by: INTERNAL MEDICINE

## 2020-03-23 RX ORDER — DILTIAZEM HYDROCHLORIDE 120 MG/1
120 CAPSULE, COATED, EXTENDED RELEASE ORAL DAILY
Qty: 30 CAPSULE | Refills: 5 | Status: SHIPPED | OUTPATIENT
Start: 2020-03-23 | End: 2020-06-06

## 2020-03-23 ASSESSMENT — ENCOUNTER SYMPTOMS
SHORTNESS OF BREATH: 0
VOMITING: 0
GASTROINTESTINAL NEGATIVE: 1
EYES NEGATIVE: 1
DIARRHEA: 0
NAUSEA: 0
RESPIRATORY NEGATIVE: 1

## 2020-03-23 NOTE — PROGRESS NOTES
[facility] Progress Note                            Date:  3/23/2020  Patient: Elias Fraction  Age:  78 y.o., 1950      Reason for evaluation:         SUBJECTIVE:    Returns today follow-up assessment. Recently had an episode where he thought he was in atrial fibrillation went to the emergency department by the time he arrived there he was in a normal rhythm. Subsequent to this he wore an event recorder which showed 653 episodes of supraventricular tachycardia average heart rate 63 some of these episodes may have been atrial fibrillation. Denies chest pain or dyspnea no other complaints noted. Presently not on anticoagulation no contraindications and I am aware of. Blood pressure stable 120/78. Review of Systems   Constitutional: Negative. Negative for chills, fever and unexpected weight change. HENT: Negative. Eyes: Negative. Respiratory: Negative. Negative for shortness of breath. Cardiovascular: Negative. Negative for chest pain. Gastrointestinal: Negative. Negative for diarrhea, nausea and vomiting. Endocrine: Negative. Genitourinary: Negative. Musculoskeletal: Negative. Skin: Negative. Neurological: Negative. All other systems reviewed and are negative. OBJECTIVE:     /78   Ht 5' 9\" (1.753 m)   Wt 168 lb (76.2 kg)   BMI 24.81 kg/m²     Labs:   CBC: No results for input(s): WBC, HGB, HCT, PLT in the last 72 hours. BMP:No results for input(s): NA, K, CO2, BUN, CREATININE, LABGLOM, GLUCOSE in the last 72 hours. BNP: No results for input(s): BNP in the last 72 hours. PT/INR: No results for input(s): PROTIME, INR in the last 72 hours. APTT:No results for input(s): APTT in the last 72 hours. CARDIAC ENZYMES:No results for input(s): CKTOTAL, CKMB, CKMBINDEX, TROPONINI in the last 72 hours.   FASTING LIPID PANEL:  Lab Results   Component Value Date    HDL 79 02/17/2020    LDLCALC 100 02/17/2020    TRIG 63 02/17/2020     LIVER Inability: Not on file    Transportation needs     Medical: Not on file     Non-medical: Not on file   Tobacco Use    Smoking status: Never Smoker    Smokeless tobacco: Never Used   Substance and Sexual Activity    Alcohol use: Yes     Alcohol/week: 3.0 standard drinks     Types: 3 Glasses of wine per week    Drug use: No    Sexual activity: Not on file   Lifestyle    Physical activity     Days per week: Not on file     Minutes per session: Not on file    Stress: Not on file   Relationships    Social connections     Talks on phone: Not on file     Gets together: Not on file     Attends Jain service: Not on file     Active member of club or organization: Not on file     Attends meetings of clubs or organizations: Not on file     Relationship status: Not on file    Intimate partner violence     Fear of current or ex partner: Not on file     Emotionally abused: Not on file     Physically abused: Not on file     Forced sexual activity: Not on file   Other Topics Concern    Not on file   Social History Narrative    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 Constitutional:       Appearance: He is well-developed. Neck:      Vascular: No carotid bruit or JVD. Cardiovascular:      Rate and Rhythm: Normal rate and regular rhythm. Heart sounds: Normal heart sounds. No murmur. No friction rub. No gallop. Pulmonary:      Effort: Pulmonary effort is normal. No respiratory distress. Breath sounds: Normal breath sounds.  No wheezing

## 2020-04-10 DIAGNOSIS — N40.1 BPH WITH URINARY OBSTRUCTION: Primary | ICD-10-CM

## 2020-04-10 DIAGNOSIS — N13.8 BPH WITH URINARY OBSTRUCTION: Primary | ICD-10-CM

## 2020-05-28 ENCOUNTER — TELEMEDICINE (OUTPATIENT)
Dept: CARDIOLOGY | Facility: CLINIC | Age: 70
End: 2020-05-28

## 2020-05-28 VITALS
HEART RATE: 63 BPM | DIASTOLIC BLOOD PRESSURE: 62 MMHG | SYSTOLIC BLOOD PRESSURE: 115 MMHG | BODY MASS INDEX: 23.85 KG/M2 | WEIGHT: 161 LBS | HEIGHT: 69 IN

## 2020-05-28 DIAGNOSIS — R06.09 DOE (DYSPNEA ON EXERTION): ICD-10-CM

## 2020-05-28 DIAGNOSIS — I47.1 PAROXYSMAL SVT (SUPRAVENTRICULAR TACHYCARDIA) (HCC): ICD-10-CM

## 2020-05-28 DIAGNOSIS — R31.29 MICROSCOPIC HEMATURIA: ICD-10-CM

## 2020-05-28 DIAGNOSIS — N13.8 BPH WITH URINARY OBSTRUCTION: ICD-10-CM

## 2020-05-28 DIAGNOSIS — E03.9 HYPOTHYROIDISM (ACQUIRED): ICD-10-CM

## 2020-05-28 DIAGNOSIS — N40.1 BPH WITH URINARY OBSTRUCTION: ICD-10-CM

## 2020-05-28 DIAGNOSIS — I48.0 PAF (PAROXYSMAL ATRIAL FIBRILLATION) (HCC): Primary | ICD-10-CM

## 2020-05-28 PROBLEM — I47.10 PAROXYSMAL SVT (SUPRAVENTRICULAR TACHYCARDIA): Status: ACTIVE | Noted: 2020-05-28

## 2020-05-28 PROBLEM — I51.9 LV DYSFUNCTION: Status: ACTIVE | Noted: 2020-05-28

## 2020-05-28 PROCEDURE — 99204 OFFICE O/P NEW MOD 45 MIN: CPT | Performed by: INTERNAL MEDICINE

## 2020-05-28 RX ORDER — CARVEDILOL 3.12 MG/1
3.12 TABLET ORAL 2 TIMES DAILY
Qty: 60 TABLET | Refills: 11 | Status: SHIPPED | OUTPATIENT
Start: 2020-05-28 | End: 2020-11-13

## 2020-05-28 NOTE — PROGRESS NOTES
David Schulz  [id number]  1950  69 y.o.  male        You have chosen to receive care through a telehealth visit.  Do you consent to use a video/audio connection for your medical care today? Yes      Referring Provider: Ingrid Vergara MD    Reason for  Visit:     Initial visit for virtual visit using above modality   Echo in [facility] LVEF 42%   Low risk stress test   Saw Dr Emmanuel in past     Subjective    Mild chronic exertional shortness of breath on exertion relieved with rest  No significant cough or wheezing    Intermittent palpitations, once every several weeks lasting for several hours  No associated symptoms of dizziness, weakness, chest pain,  shortness of breath      No associated chest pain  No significant pedal edema    No fever or chills  No significant expectoration    No hemoptysis  No presyncope or syncope    Tolerating current medications well with no untoward side effects   Compliant with prescribed medication regimen. Tries to adhere to cardiac diet.   Joint pain in small, medium and large joints     September 2019 was admitted to [facility] for Paroxysmal atrial fibrillation     History of present illness:  David Schulz is a 69 y.o. yo male with history of Paroxysmal atrial fibrillation  who presents today for   Chief Complaint   Patient presents with   • Initial Evaluation   • Atrial Fibrillation   .    History  Past Medical History:   Diagnosis Date   • Acute retention of urine    • Atrial fibrillation (CMS/HCC)    • BPH (benign prostatic hyperplasia)    • Hyperthyroidism    • Microscopic hematuria    • Peyronie's disease    ,   Past Surgical History:   Procedure Laterality Date   • CARPAL TUNNEL RELEASE     • CLAVICLE SURGERY     • THYROIDECTOMY     • THYROIDECTOMY     • TOTAL HIP ARTHROPLASTY     • VASECTOMY     ,   Family History   Problem Relation Age of Onset   • Heart disease Father    • Heart attack Father    • No Known Problems Mother    • Heart attack Brother    •  "Heart disease Brother    • Heart disease Paternal Grandfather    • Heart attack Paternal Grandfather    ,   Social History     Tobacco Use   • Smoking status: Former Smoker   • Smokeless tobacco: Never Used   Substance Use Topics   • Alcohol use: Yes     Alcohol/week: 2.0 standard drinks     Types: 2 Glasses of wine per week   • Drug use: Not on file   ,     Medications  Current Outpatient Medications   Medication Sig Dispense Refill   • apixaban (ELIQUIS) 5 MG tablet tablet Take 5 mg by mouth 2 (Two) Times a Day.     • levothyroxine (SYNTHROID, LEVOTHROID) 125 MCG tablet      • QUEtiapine (SEROquel) 50 MG tablet 1 TABLET(S) BY MOUTH NIGHTLY  3   • tamsulosin (FLOMAX) 0.4 MG capsule 24 hr capsule Take 1 capsule by mouth every night at bedtime. 90 capsule 3   • carvedilol (COREG) 3.125 MG tablet Take 1 tablet by mouth 2 (Two) Times a Day. 60 tablet 11   • dronedarone (Multaq) 400 MG tablet Take 1 tablet by mouth 2 (Two) Times a Day With Meals. 60 tablet 11     No current facility-administered medications for this visit.        Allergies:  Morphine and Morphine and related    Review of Systems  Review of Systems   Constitution: Negative.   HENT: Negative.    Eyes: Negative.    Cardiovascular: Positive for dyspnea on exertion and palpitations. Negative for chest pain, claudication, cyanosis, irregular heartbeat, leg swelling, near-syncope, orthopnea, paroxysmal nocturnal dyspnea and syncope.   Respiratory: Negative.    Endocrine: Negative.    Hematologic/Lymphatic: Negative.    Skin: Negative.    Musculoskeletal: Positive for arthritis.   Gastrointestinal: Negative for anorexia.   Genitourinary: Negative.    Neurological: Negative.    Psychiatric/Behavioral: Negative.        Objective     Physical Exam:  /62   Pulse 63   Ht 175.3 cm (69\")   Wt 73 kg (161 lb)   BMI 23.78 kg/m²      Self reported vitals above as available      • General appearance is normal  • Normal judgment and insight  • Normal assessment of " mental status, which may include:  - Orientation to time, place, and person  - Recent and remote memory  - Mood and affect  • Normal external appearance of the ears and nose  • Normal assessment of hearing  • Normal external appearance of lips  • Normal external appearance of anterior surface of neck  • Normal respiratory effort  • No reported lower extremity edema  • No obvious skin abnormalities of visualized skin surfaces    • Normal examination of digits and nails (clubbing, cyanosis ,ischemia)  • No obvious abnormalities of visualized joint or musculature  • Normalized range of motion of visualized muscle groups      Results Review:        Study Location: Echo Lab  Technical Quality: Adequate visualization    Patient Status: Inpatient    Rhythm: Atrial fibrillation     Conclusions     Summary   Mitral valve leaflets are mildly thickened with preserved leaflet   mobility.   Trace mitral regurgitation.   Mildly thickened aortic valve leaflets with preserved leaflet mobility.   Aortic valve appears to be tricuspid.   No evidence of aortic stenosis; trivial aortic regurgitation.   Trivial tricuspid regurgitation with estimated RVSP of 19 mm Hg.   Mildly dilated left atrium.   Left ventricular size is normal .   Mild concentric left ventricular hypertrophy.   mildly decreased ejection fraction.   Unable to obtain EF due to rhythm.   Left ventricular ejection fraction is estimated at 42%.     Signature     ----------------------------------------------------------------   Electronically signed by Robin Hitchcock MD(Interpreting   physician) on 09/01/2019 05:24 PM   ----------------------------------------------------------------     Procedures    Assessment/Plan   David was seen today for initial evaluation and atrial fibrillation.    Diagnoses and all orders for this visit:    PAF (paroxysmal atrial fibrillation) (CMS/[facility])  -     Holter Monitor - 72 Hour Up To 21 Days; Future    Paroxysmal SVT (supraventricular  tachycardia) (CMS/HCC)    Hypothyroidism (acquired)    BPH with urinary obstruction    Microscopic hematuria    VALENTINE (dyspnea on exertion)  -     Adult Transthoracic Echo Limited W/ Cont if Necessary Per Protocol; Future    Other orders  -     dronedarone (Multaq) 400 MG tablet; Take 1 tablet by mouth 2 (Two) Times a Day With Meals.  -     carvedilol (COREG) 3.125 MG tablet; Take 1 tablet by mouth 2 (Two) Times a Day.           Plan      Orders Placed This Encounter   Procedures   • Holter Monitor - 72 Hour Up To 21 Days     Standing Status:   Future     Standing Expiration Date:   5/28/2021     Order Specific Question:   Reason for exam?     Answer:   AFib   • Adult Transthoracic Echo Limited W/ Cont if Necessary Per Protocol     Standing Status:   Future     Standing Expiration Date:   5/28/2021     Order Specific Question:   Reason for exam?     Answer:   Dyspnea     Order Specific Question:   Reason for exam?     Answer:   Palpitations      Requested Prescriptions     Signed Prescriptions Disp Refills   • dronedarone (Multaq) 400 MG tablet 60 tablet 11     Sig: Take 1 tablet by mouth 2 (Two) Times a Day With Meals.   • carvedilol (COREG) 3.125 MG tablet 60 tablet 11     Sig: Take 1 tablet by mouth 2 (Two) Times a Day.        ____________________________________________________________________________________________________________________________________________  Health maintenance and recommendations      Low salt/ HTN/ Heart healthy carbohydrate restricted cardiac diet   The patient is advised to reduce or avoid caffeine or other cardiac stimulants.     Minimize or avoid  NSAID-type medications        Monitor for any signs of bleeding including red or dark stools. Fall precautions.        Advised staying uptodate with immunizations per established standard guidelines.      Offered to give patient  a copy of my notes       Questions were encouraged, asked and answered to the patient's  understanding and  satisfaction. Questions if any regarding current medications and side effects, need for refills and importance of compliance to medications stressed.    Reviewed available prior notes, consults, prior visits, laboratory findings, radiology and cardiology relevant reports. Updated chart as applicable. I have reviewed the patient's medical history in detail and updated the computerized patient record as relevant.      Updated patient regarding any new or relevant abnormalities on review of records or any new findings on physical exam. Mentioned to patient about purpose of visit and desirable health short and long term goals and objectives.    Primary to monitor CBC CMP Lipid panel and TSH as applicable    ___________________________________________________________________________________________________________________________________________          Return in about 4 weeks (around 6/25/2020).

## 2020-06-06 ENCOUNTER — HOSPITAL ENCOUNTER (EMERGENCY)
Age: 70
Discharge: HOME | End: 2020-06-06
Attending: EMERGENCY MEDICINE
Payer: MEDICARE

## 2020-06-06 ENCOUNTER — APPOINTMENT (OUTPATIENT)
Dept: CT IMAGING | Age: 70
End: 2020-06-06
Payer: MEDICARE

## 2020-06-06 VITALS
OXYGEN SATURATION: 96 % | RESPIRATION RATE: 20 BRPM | SYSTOLIC BLOOD PRESSURE: 122 MMHG | TEMPERATURE: 97.5 F | BODY MASS INDEX: 23.99 KG/M2 | DIASTOLIC BLOOD PRESSURE: 72 MMHG | WEIGHT: 162 LBS | HEART RATE: 52 BPM | HEIGHT: 69 IN

## 2020-06-06 LAB
ALBUMIN SERPL-MCNC: 4.4 G/DL (ref 3.5–5.2)
ALP BLD-CCNC: 52 U/L (ref 40–130)
ALT SERPL-CCNC: 11 U/L (ref 5–41)
ANION GAP SERPL CALCULATED.3IONS-SCNC: 9 MMOL/L (ref 7–19)
APTT: 31 SEC (ref 26–36.2)
AST SERPL-CCNC: 14 U/L (ref 5–40)
BASOPHILS ABSOLUTE: 0 K/UL (ref 0–0.2)
BASOPHILS RELATIVE PERCENT: 0.8 % (ref 0–1)
BILIRUB SERPL-MCNC: 0.9 MG/DL (ref 0.2–1.2)
BUN BLDV-MCNC: 11 MG/DL (ref 8–23)
CALCIUM SERPL-MCNC: 8.1 MG/DL (ref 8.8–10.2)
CHLORIDE BLD-SCNC: 103 MMOL/L (ref 98–111)
CO2: 26 MMOL/L (ref 22–29)
CREAT SERPL-MCNC: 0.6 MG/DL (ref 0.5–1.2)
EOSINOPHILS ABSOLUTE: 0.2 K/UL (ref 0–0.6)
EOSINOPHILS RELATIVE PERCENT: 4.3 % (ref 0–5)
GFR NON-AFRICAN AMERICAN: >60
GLUCOSE BLD-MCNC: 105 MG/DL (ref 74–109)
HCT VFR BLD CALC: 38.4 % (ref 42–52)
HEMOGLOBIN: 13.5 G/DL (ref 14–18)
IMMATURE GRANULOCYTES #: 0 K/UL
INR BLD: 1.17 (ref 0.88–1.18)
LYMPHOCYTES ABSOLUTE: 0.6 K/UL (ref 1.1–4.5)
LYMPHOCYTES RELATIVE PERCENT: 16.8 % (ref 20–40)
MCH RBC QN AUTO: 33.1 PG (ref 27–31)
MCHC RBC AUTO-ENTMCNC: 35.2 G/DL (ref 33–37)
MCV RBC AUTO: 94.1 FL (ref 80–94)
MONOCYTES ABSOLUTE: 0.4 K/UL (ref 0–0.9)
MONOCYTES RELATIVE PERCENT: 9.8 % (ref 0–10)
NEUTROPHILS ABSOLUTE: 2.6 K/UL (ref 1.5–7.5)
NEUTROPHILS RELATIVE PERCENT: 68 % (ref 50–65)
PDW BLD-RTO: 12.8 % (ref 11.5–14.5)
PLATELET # BLD: 180 K/UL (ref 130–400)
PMV BLD AUTO: 9.9 FL (ref 9.4–12.4)
POTASSIUM REFLEX MAGNESIUM: 4.4 MMOL/L (ref 3.5–5)
PROTHROMBIN TIME: 14.9 SEC (ref 12–14.6)
RBC # BLD: 4.08 M/UL (ref 4.7–6.1)
SODIUM BLD-SCNC: 138 MMOL/L (ref 136–145)
TOTAL PROTEIN: 6.3 G/DL (ref 6.6–8.7)
WBC # BLD: 3.8 K/UL (ref 4.8–10.8)

## 2020-06-06 PROCEDURE — 93971 EXTREMITY STUDY: CPT

## 2020-06-06 PROCEDURE — 99999 PR OFFICE/OUTPT VISIT,PROCEDURE ONLY: CPT | Performed by: EMERGENCY MEDICINE

## 2020-06-06 PROCEDURE — 75635 CT ANGIO ABDOMINAL ARTERIES: CPT

## 2020-06-06 PROCEDURE — 99284 EMERGENCY DEPT VISIT MOD MDM: CPT

## 2020-06-06 PROCEDURE — 85730 THROMBOPLASTIN TIME PARTIAL: CPT

## 2020-06-06 PROCEDURE — 85025 COMPLETE CBC W/AUTO DIFF WBC: CPT

## 2020-06-06 PROCEDURE — 85610 PROTHROMBIN TIME: CPT

## 2020-06-06 PROCEDURE — 6360000004 HC RX CONTRAST MEDICATION: Performed by: EMERGENCY MEDICINE

## 2020-06-06 PROCEDURE — 36415 COLL VENOUS BLD VENIPUNCTURE: CPT

## 2020-06-06 PROCEDURE — 80053 COMPREHEN METABOLIC PANEL: CPT

## 2020-06-06 RX ORDER — CARVEDILOL 3.12 MG/1
3.12 TABLET ORAL 2 TIMES DAILY WITH MEALS
COMMUNITY
End: 2021-02-09

## 2020-06-06 RX ORDER — TIZANIDINE 4 MG/1
4 TABLET ORAL EVERY 8 HOURS PRN
Qty: 10 TABLET | Refills: 0 | Status: SHIPPED | OUTPATIENT
Start: 2020-06-06 | End: 2021-02-09

## 2020-06-06 RX ADMIN — IOPAMIDOL 90 ML: 755 INJECTION, SOLUTION INTRAVENOUS at 11:24

## 2020-06-06 ASSESSMENT — ENCOUNTER SYMPTOMS
VOICE CHANGE: 0
ABDOMINAL PAIN: 0
BACK PAIN: 1
VOMITING: 0
EYE PAIN: 0
EYE REDNESS: 0
SHORTNESS OF BREATH: 0
RHINORRHEA: 0
COUGH: 0
DIARRHEA: 0

## 2020-06-06 ASSESSMENT — PAIN SCALES - GENERAL: PAINLEVEL_OUTOF10: 5

## 2020-06-06 NOTE — ED TRIAGE NOTES
Pt to ED with c/o back and right leg pain that began today Pt sent to ED per PCP with concerns of \"blood clot\"

## 2020-06-06 NOTE — ED PROVIDER NOTES
[facility] EMERGENCY DEPT  EMERGENCY DEPARTMENT ENCOUNTER      Pt Name: Braden Woodward  MRN: [id number]  [place] 1950  Date of evaluation: 6/6/2020  Provider: Brady López MD    [address] [place] [address]       Chief Complaint   Patient presents with    Back Pain     Pt to ED with c/o back and right leg pain that began today     Leg Pain         HISTORY OF PRESENT ILLNESS   (Location/Symptom, Timing/Onset,Context/Setting, Quality, Duration, Modifying Factors, Severity)  Note limiting factors. Braden Woodward is a 78 y.o. male who presents to the emergency department for evaluation of pain to the right thigh that has been present intermittently over the last 1 to 2 weeks and gradually worsening. Pain worsens with exertion and improves with rest.  Denies any associated weakness, numbness, or tingling. States it does seem to improve with leaning forward. Denies any associated leg swelling or skin changes in the right leg. Patient does have a history of paroxysmal atrial fibrillation and is on anticoagulation. Patient was advised to come here for evaluation of potential blood clot by his primary care doctor. Patient denies any history of DVT but does state that he had varicose veins in the past that were severe and required vein stripping. Patient also has low back pain today which was not present previously. Seems unrelated to the leg pain and does not seem to radiate down the leg. HPI    NursingNotes were reviewed. REVIEW OF SYSTEMS    (2-9 systems for level 4, 10 or more for level 5)     Review of Systems   Constitutional: Negative for fatigue and fever. HENT: Negative for congestion, rhinorrhea and voice change. Eyes: Negative for pain and redness. Respiratory: Negative for cough and shortness of breath. Cardiovascular: Negative for chest pain. Gastrointestinal: Negative for abdominal pain, diarrhea and vomiting. Endocrine: Negative. Genitourinary: Negative.     Musculoskeletal: Positive for Marital status: Single     Spouse name: None    Number of children: None    Years of education: None    Highest education level: None   Occupational History    None   Social Needs    Financial resource strain: None    Food insecurity     Worry: None     Inability: None    Transportation needs     Medical: None     Non-medical: None   Tobacco Use    Smoking status: Never Smoker    Smokeless tobacco: Never Used   Substance and Sexual Activity    Alcohol use: Yes     Alcohol/week: 3.0 standard drinks     Types: 3 Glasses of wine per week    Drug use: No    Sexual activity: None   Lifestyle    Physical activity     Days per week: None     Minutes per session: None    Stress: None   Relationships    Social connections     Talks on phone: None     Gets together: None     Attends Congregation service: None     Active member of club or organization: None     Attends meetings of clubs or organizations: None     Relationship status: None    Intimate partner violence     Fear of current or ex partner: None     Emotionally abused: None     Physically abused: None     Forced sexual activity: None   Other Topics Concern    None   Social History Narrative    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)    Here with his significant other    Previously  and     He has 1 son and 1 daughter    Education masters degree in fine arts    Former distance runner also bicyclist not so much these days    Smoked minimally 3 to 4 years total many years ago denies alcohol consumption or substance usage       SCREENINGS             PHYSICAL EXAM    (up to 7 for level 4, 8 or more for level 5)     ED Triage Vitals [06/06/20 0838]   BP Temp Temp src Pulse Resp SpO2 Height Weight   122/72 97.5 °F (36.4 °C) -- 52 20 96 % 5' 9\" (1.753 m) 162 lb (73.5 kg)       Physical Exam  Vitals signs and nursing note reviewed. Constitutional:       General: He is not in acute distress.      Appearance: He is pain today is really seem associated with the pain in his thigh recently. I believe pseudoclaudication is still a possibility as a cause of patient's pain based on description, though there is no significant stenosis lumbar spinal defect to explain patient's symptoms. At this point, will plan to symptomatically treat with muscle relaxers for suspected musculoskeletal etiology of patient's pain with plan for outpatient follow-up if continues for further evaluation. Evaluation and work-up here revealed no signs of emergent or life-threatening pathology that would necessitate admission for further work-up or management at this time. It is felt to be stable for discharge home with return precautions for worsening of the condition or development of new concerning symptoms. Patient was encouraged to follow-up with their primary care doctor in the appropriate timeframe. Necessary prescriptions and information have been provided for treatment at home. Patient voices understanding and agreement with the plan. CONSULTS:  None    PROCEDURES:  Unless otherwise notedbelow, none     Procedures      FINAL IMPRESSION     1. Right thigh pain    2. On continuous oral anticoagulation    3. History of atrial fibrillation    4.  History of right hip replacement          DISPOSITION/PLAN   DISPOSITION        PATIENT REFERRED TO:  [facility] EMERGENCY DEPT  [facility]  [phone]    If symptoms worsen    Jose L David MD  [facility]  [phone]      As needed      DISCHARGE MEDICATIONS:  New Prescriptions    TIZANIDINE (ZANAFLEX) 4 MG TABLET    Take 1 tablet by mouth every 8 hours as needed (pain)          (Please note that portions of this note were completed with a voice recognition program.  Efforts were made to edit the dictations butoccasionally words are mis-transcribed.)    Lilia eSgovia MD (electronically signed)  AttendingEmerSt. Anthony's Healthcare Center Physician

## 2020-06-08 ENCOUNTER — HOSPITAL ENCOUNTER (OUTPATIENT)
Dept: CARDIOLOGY | Facility: HOSPITAL | Age: 70
Discharge: HOME | End: 2020-06-08
Admitting: INTERNAL MEDICINE

## 2020-06-08 VITALS
BODY MASS INDEX: 23.84 KG/M2 | WEIGHT: 160.94 LBS | DIASTOLIC BLOOD PRESSURE: 69 MMHG | SYSTOLIC BLOOD PRESSURE: 125 MMHG | HEIGHT: 69 IN

## 2020-06-08 DIAGNOSIS — R06.09 DOE (DYSPNEA ON EXERTION): ICD-10-CM

## 2020-06-08 PROCEDURE — 93325 DOPPLER ECHO COLOR FLOW MAPG: CPT

## 2020-06-08 PROCEDURE — 93308 TTE F-UP OR LMTD: CPT | Performed by: INTERNAL MEDICINE

## 2020-06-08 PROCEDURE — 93325 DOPPLER ECHO COLOR FLOW MAPG: CPT | Performed by: INTERNAL MEDICINE

## 2020-06-08 PROCEDURE — 93321 DOPPLER ECHO F-UP/LMTD STD: CPT | Performed by: INTERNAL MEDICINE

## 2020-06-08 PROCEDURE — 93308 TTE F-UP OR LMTD: CPT

## 2020-06-08 PROCEDURE — 93321 DOPPLER ECHO F-UP/LMTD STD: CPT

## 2020-06-09 LAB
BH CV ECHO MEAS - AO MAX PG (FULL): 0.36 MMHG
BH CV ECHO MEAS - AO MAX PG: 5.1 MMHG
BH CV ECHO MEAS - AO MEAN PG (FULL): 1 MMHG
BH CV ECHO MEAS - AO MEAN PG: 3 MMHG
BH CV ECHO MEAS - AO ROOT AREA (BSA CORRECTED): 1.8
BH CV ECHO MEAS - AO ROOT AREA: 9.1 CM^2
BH CV ECHO MEAS - AO ROOT DIAM: 3.4 CM
BH CV ECHO MEAS - AO V2 MAX: 113 CM/SEC
BH CV ECHO MEAS - AO V2 MEAN: 75.9 CM/SEC
BH CV ECHO MEAS - AO V2 VTI: 26.2 CM
BH CV ECHO MEAS - AVA(I,A): 4.1 CM^2
BH CV ECHO MEAS - AVA(I,D): 4.1 CM^2
BH CV ECHO MEAS - AVA(V,A): 4 CM^2
BH CV ECHO MEAS - AVA(V,D): 4 CM^2
BH CV ECHO MEAS - BSA(HAYCOCK): 1.9 M^2
BH CV ECHO MEAS - BSA: 1.9 M^2
BH CV ECHO MEAS - BZI_BMI: 23.8 KILOGRAMS/M^2
BH CV ECHO MEAS - BZI_METRIC_HEIGHT: 175.3 CM
BH CV ECHO MEAS - BZI_METRIC_WEIGHT: 73 KG
BH CV ECHO MEAS - EDV(CUBED): 158.3 ML
BH CV ECHO MEAS - EDV(MOD-SP4): 126 ML
BH CV ECHO MEAS - EDV(TEICH): 141.9 ML
BH CV ECHO MEAS - EF(CUBED): 81.9 %
BH CV ECHO MEAS - EF(MOD-SP4): 70.7 %
BH CV ECHO MEAS - EF(TEICH): 74.1 %
BH CV ECHO MEAS - ESV(CUBED): 28.7 ML
BH CV ECHO MEAS - ESV(MOD-SP4): 36.9 ML
BH CV ECHO MEAS - ESV(TEICH): 36.7 ML
BH CV ECHO MEAS - FS: 43.4 %
BH CV ECHO MEAS - IVS/LVPW: 0.8
BH CV ECHO MEAS - IVSD: 0.86 CM
BH CV ECHO MEAS - LA DIMENSION: 3.9 CM
BH CV ECHO MEAS - LA/AO: 1.1
BH CV ECHO MEAS - LV DIASTOLIC VOL/BSA (35-75): 66.9 ML/M^2
BH CV ECHO MEAS - LV MASS(C)D: 197.9 GRAMS
BH CV ECHO MEAS - LV MASS(C)DI: 105 GRAMS/M^2
BH CV ECHO MEAS - LV MAX PG: 4.8 MMHG
BH CV ECHO MEAS - LV MEAN PG: 2 MMHG
BH CV ECHO MEAS - LV SYSTOLIC VOL/BSA (12-30): 19.6 ML/M^2
BH CV ECHO MEAS - LV V1 MAX: 109 CM/SEC
BH CV ECHO MEAS - LV V1 MEAN: 72 CM/SEC
BH CV ECHO MEAS - LV V1 VTI: 25.7 CM
BH CV ECHO MEAS - LVIDD: 5.4 CM
BH CV ECHO MEAS - LVIDS: 3.1 CM
BH CV ECHO MEAS - LVLD AP4: 9.1 CM
BH CV ECHO MEAS - LVLS AP4: 7.5 CM
BH CV ECHO MEAS - LVOT AREA (M): 4.2 CM^2
BH CV ECHO MEAS - LVOT AREA: 4.2 CM^2
BH CV ECHO MEAS - LVOT DIAM: 2.3 CM
BH CV ECHO MEAS - LVPWD: 1.1 CM
BH CV ECHO MEAS - MV A MAX VEL: 81.5 CM/SEC
BH CV ECHO MEAS - MV DEC TIME: 0.2 SEC
BH CV ECHO MEAS - MV E MAX VEL: 73.7 CM/SEC
BH CV ECHO MEAS - MV E/A: 0.9
BH CV ECHO MEAS - RAP SYSTOLE: 5 MMHG
BH CV ECHO MEAS - RVSP: 22.8 MMHG
BH CV ECHO MEAS - SI(AO): 126.3 ML/M^2
BH CV ECHO MEAS - SI(CUBED): 68.8 ML/M^2
BH CV ECHO MEAS - SI(LVOT): 56.7 ML/M^2
BH CV ECHO MEAS - SI(MOD-SP4): 47.3 ML/M^2
BH CV ECHO MEAS - SI(TEICH): 55.8 ML/M^2
BH CV ECHO MEAS - SV(AO): 237.9 ML
BH CV ECHO MEAS - SV(CUBED): 129.7 ML
BH CV ECHO MEAS - SV(LVOT): 106.8 ML
BH CV ECHO MEAS - SV(MOD-SP4): 89.1 ML
BH CV ECHO MEAS - SV(TEICH): 105.2 ML
BH CV ECHO MEAS - TR MAX VEL: 211 CM/SEC
LEFT ATRIUM VOLUME INDEX: 34.3 ML/M2
LEFT ATRIUM VOLUME: 64.5 CM3
LV EF 2D ECHO EST: 70 %
MAXIMAL PREDICTED HEART RATE: 150 BPM
STRESS TARGET HR: 128 BPM

## 2020-07-10 ENCOUNTER — OFFICE VISIT (OUTPATIENT)
Dept: CARDIOLOGY | Facility: CLINIC | Age: 70
End: 2020-07-10

## 2020-07-10 VITALS
HEIGHT: 69 IN | HEART RATE: 48 BPM | DIASTOLIC BLOOD PRESSURE: 70 MMHG | OXYGEN SATURATION: 98 % | SYSTOLIC BLOOD PRESSURE: 138 MMHG | WEIGHT: 169 LBS | BODY MASS INDEX: 25.03 KG/M2

## 2020-07-10 DIAGNOSIS — N40.1 BPH WITH URINARY OBSTRUCTION: ICD-10-CM

## 2020-07-10 DIAGNOSIS — I47.1 PAROXYSMAL SVT (SUPRAVENTRICULAR TACHYCARDIA) (HCC): ICD-10-CM

## 2020-07-10 DIAGNOSIS — I47.29 NSVT (NONSUSTAINED VENTRICULAR TACHYCARDIA) (HCC): ICD-10-CM

## 2020-07-10 DIAGNOSIS — R06.09 DOE (DYSPNEA ON EXERTION): ICD-10-CM

## 2020-07-10 DIAGNOSIS — N13.8 BPH WITH URINARY OBSTRUCTION: ICD-10-CM

## 2020-07-10 DIAGNOSIS — I48.0 PAF (PAROXYSMAL ATRIAL FIBRILLATION) (HCC): ICD-10-CM

## 2020-07-10 DIAGNOSIS — R31.29 MICROSCOPIC HEMATURIA: ICD-10-CM

## 2020-07-10 DIAGNOSIS — E03.9 HYPOTHYROIDISM (ACQUIRED): ICD-10-CM

## 2020-07-10 DIAGNOSIS — I51.9 LV DYSFUNCTION: Primary | ICD-10-CM

## 2020-07-10 DIAGNOSIS — I25.6 SILENT MYOCARDIAL ISCHEMIA: ICD-10-CM

## 2020-07-10 PROCEDURE — 93000 ELECTROCARDIOGRAM COMPLETE: CPT | Performed by: INTERNAL MEDICINE

## 2020-07-10 PROCEDURE — 99214 OFFICE O/P EST MOD 30 MIN: CPT | Performed by: INTERNAL MEDICINE

## 2020-07-10 RX ORDER — LISINOPRIL 5 MG/1
5 TABLET ORAL DAILY
Qty: 90 TABLET | Refills: 3 | Status: SHIPPED | OUTPATIENT
Start: 2020-07-10 | End: 2021-06-15

## 2020-07-10 NOTE — PROGRESS NOTES
David Schulz  [id number]  1950  70 y.o.  male             Referring Provider: Ingrid Vergara MD    Reason for  Visit:    Here for routine follow up   Initial visit for virtual visit using above modality   Echo in [facility] LVEF 42%   Low risk stress test   Saw Dr Emmanuel in past   ECG today marked sinus bradycardia     Cardiac workup test results as below     Subjective        Overall feels well    No new events or complaints since last visit   Overall the patient feels no major change from baseline symptoms   Similar symptoms as during last visit     Tolerating current medications well with no untoward side effects   Compliant with prescribed medication regimen. Tries to adhere to cardiac diet.      Mild chronic exertional shortness of breath on exertion relieved with rest  No significant cough or wheezing    Intermittent palpitations, once every several weeks lasting for several hours  No associated symptoms of dizziness, weakness, chest pain,  shortness of breath      No associated chest pain  No significant pedal edema    No fever or chills  No significant expectoration    No hemoptysis  No presyncope or syncope    Tolerating current medications well with no untoward side effects   Compliant with prescribed medication regimen. Tries to adhere to cardiac diet.   Joint pain in small, medium and large joints     September 2019 was admitted to [facility] for Paroxysmal atrial fibrillation     Intermittent dizzy spells, no presyncope or syncope     BP elevated at home     History of present illness:  Daivd Schulz is a 70 y.o. yo male with history of Paroxysmal atrial fibrillation  who presents today for   Chief Complaint   Patient presents with   • Atrial Fibrillation     1 mo f/u   .    History  Past Medical History:   Diagnosis Date   • Acute retention of urine    • Atrial fibrillation (CMS/HCC)    • BPH (benign prostatic hyperplasia)    • Hyperthyroidism    • Microscopic hematuria    • Peyronie's  disease    ,   Past Surgical History:   Procedure Laterality Date   • CARPAL TUNNEL RELEASE     • CLAVICLE SURGERY     • THYROIDECTOMY     • THYROIDECTOMY     • TOTAL HIP ARTHROPLASTY     • VASECTOMY     ,   Family History   Problem Relation Age of Onset   • Heart disease Father    • Heart attack Father    • No Known Problems Mother    • Heart attack Brother    • Heart disease Brother    • Heart disease Paternal Grandfather    • Heart attack Paternal Grandfather    ,   Social History     Tobacco Use   • Smoking status: Former Smoker   • Smokeless tobacco: Never Used   Substance Use Topics   • Alcohol use: Yes   • Drug use: Not on file   ,     Medications  Current Outpatient Medications   Medication Sig Dispense Refill   • apixaban (ELIQUIS) 5 MG tablet tablet Take 5 mg by mouth 2 (Two) Times a Day.     • carvedilol (COREG) 3.125 MG tablet Take 1 tablet by mouth 2 (Two) Times a Day. 60 tablet 11   • dronedarone (Multaq) 400 MG tablet Take 1 tablet by mouth 2 (Two) Times a Day With Meals. 60 tablet 11   • levothyroxine (SYNTHROID, LEVOTHROID) 125 MCG tablet      • QUEtiapine (SEROquel) 50 MG tablet 1 TABLET(S) BY MOUTH NIGHTLY  3   • tamsulosin (FLOMAX) 0.4 MG capsule 24 hr capsule Take 1 capsule by mouth every night at bedtime. 90 capsule 3   • lisinopril (PRINIVIL,ZESTRIL) 5 MG tablet Take 1 tablet by mouth Daily. 90 tablet 3     No current facility-administered medications for this visit.        Allergies:  Morphine and Morphine and related    Review of Systems  Review of Systems   Constitution: Negative.   HENT: Negative.    Eyes: Negative.    Cardiovascular: Positive for dyspnea on exertion and palpitations. Negative for chest pain, claudication, cyanosis, irregular heartbeat, leg swelling, near-syncope, orthopnea, paroxysmal nocturnal dyspnea and syncope.   Respiratory: Negative.    Endocrine: Negative.    Hematologic/Lymphatic: Negative.    Skin: Negative.    Musculoskeletal: Positive for arthritis.  "  Gastrointestinal: Negative for anorexia.   Genitourinary: Negative.    Neurological: Positive for dizziness and light-headedness.   Psychiatric/Behavioral: Negative.        Objective     Physical Exam:  /70   Pulse (!) 48   Ht 175.3 cm (69.02\")   Wt 76.7 kg (169 lb)   SpO2 98%   BMI 24.95 kg/m²      Physical Exam   Constitutional: He appears well-developed.   HENT:   Head: Normocephalic.   Neck: Normal carotid pulses and no JVD present. No tracheal tenderness present. Carotid bruit is not present. No tracheal deviation and no edema present.   Cardiovascular: Regular rhythm, normal heart sounds and normal pulses.   Pulmonary/Chest: Effort normal. No stridor.   Abdominal: Soft. He exhibits no distension. There is no hepatosplenomegaly. There is no tenderness.   Neurological: He is alert. He has normal strength. No cranial nerve deficit or sensory deficit.   Skin: Skin is warm.   Psychiatric: He has a normal mood and affect. His speech is normal and behavior is normal.          Results Review:        David Schulz   Holter Monitor 72Hr- (0296T/0298T)   Order# [id number]   Reading physician: Jose Manuel Akhtar MD Ordering physician: Jose Manuel Akhtar MD Study date: 20   Patient Information     Patient Name  David Schulz MRN  [id number] Sex  Male  (Age)  1950 (70 y.o.)   Interpretation Summary        · Average HR: 54. Min HR: 41. Max HR: 179.     · Entire report was reviewed.  Monitoring in days: ~13   · The predominant rhythm noted during the testing period was sinus rhythm.     · Rare supraventricular ectopics with an APC burden of: < 1%  · 65 runs of supraventricular tachycardia longest 11.4 seconds at a maximum rate of 179 bpm and an average rate of 138 bpm         · Rare premature ventricular contractions with a PVC burden of: < 1%  · 2 runs of ventricular tachycardia longest 9 beats at a maximum rate of 162 bpm and an average rate of 143 bpm     · No correlated arrhythmia  · No " significant pauses           Conclusion: Baseline rhythm is sinus.  Slowest heart rate of 41 bpm consisting of sinus bradycardia at 12:53 PM  65 runs of supraventricular tachycardia longest 11.4 seconds at a maximum rate of 179 bpm and an average rate of 138 bpm  2 runs of nonsustained ventricular tachycardia longest 9 beats at a maximum rate of 162 bpm and an average rate of 143 bpm            Results for orders placed during the hospital encounter of 06/08/20   Adult Transthoracic Echo Limited W/ Cont if Necessary Per Protocol    Narrative · Estimated EF = 70%.  · Left ventricular systolic function is normal.  · Left atrial cavity size is mildly dilated.  · No evidence of pulmonary hypertension is present.                 Study Location: Echo Lab  Technical Quality: Adequate visualization    Patient Status: Inpatient    Rhythm: Atrial fibrillation     Conclusions     Summary   Mitral valve leaflets are mildly thickened with preserved leaflet   mobility.   Trace mitral regurgitation.   Mildly thickened aortic valve leaflets with preserved leaflet mobility.   Aortic valve appears to be tricuspid.   No evidence of aortic stenosis; trivial aortic regurgitation.   Trivial tricuspid regurgitation with estimated RVSP of 19 mm Hg.   Mildly dilated left atrium.   Left ventricular size is normal .   Mild concentric left ventricular hypertrophy.   mildly decreased ejection fraction.   Unable to obtain EF due to rhythm.   Left ventricular ejection fraction is estimated at 42%.     Signature     ----------------------------------------------------------------   Electronically signed by Robin Hitchcock MD(Interpreting   physician) on 09/01/2019 05:24 PM   ----------------------------------------------------------------       ECG 12 Lead  Date/Time: 7/10/2020 8:29 AM  Performed by: Jose Manuel Akhtar MD  Authorized by: Jose Manuel Akhtar MD   Comparison: not compared with previous ECG   Rhythm: sinus bradycardia  Rate:  bradycardic  Conduction: conduction normal  ST Segments: ST segments normal  T Waves: T waves normal  QRS axis: normal  Other: no other findings    Clinical impression: abnormal EKG            Assessment/Plan   David was seen today for atrial fibrillation.    Diagnoses and all orders for this visit:    LV dysfunction LVEF 42% 9/2019 Yelitza Hitchcock    Hypothyroidism (acquired)    Paroxysmal SVT (supraventricular tachycardia) (CMS/[facility])    PAF (paroxysmal atrial fibrillation) (CMS/[facility])  -     Holter Monitor - 24 Hour; Future    Microscopic hematuria    BPH with urinary obstruction    NSVT (nonsustained ventricular tachycardia) (CMS/[facility])    VALENTINE (dyspnea on exertion)  -     CT Angiogram Coronary; Future    Silent myocardial ischemia   -     CT Angiogram Coronary; Future    Other orders  -     ECG 12 Lead  -     lisinopril (PRINIVIL,ZESTRIL) 5 MG tablet; Take 1 tablet by mouth Daily.        ____________________________________________________________________________________________________________________________________________  Health maintenance and recommendations      Similar recommendations as last visit       Offered to give patient  a copy of my notes       Questions were encouraged, asked and answered to the patient's  understanding and satisfaction. Questions if any regarding current medications and side effects, need for refills and importance of compliance to medications stressed.    Reviewed available prior notes, consults, prior visits, laboratory findings, radiology and cardiology relevant reports. Updated chart as applicable. I have reviewed the patient's medical history in detail and updated the computerized patient record as relevant.      Updated patient regarding any new or relevant abnormalities on review of records or any new findings on physical exam. Mentioned to patient about purpose of visit and desirable health short and long term goals and objectives.    Primary to monitor CBC CMP Lipid  panel and TSH as applicable    ___________________________________________________________________________________________________________________________________________             Plan    Orders Placed This Encounter   Procedures   • CT Angiogram Coronary     Standing Status:   Future     Standing Expiration Date:   7/10/2021   • Holter Monitor - 24 Hour     Standing Status:   Future     Standing Expiration Date:   7/10/2021     Order Specific Question:   Reason for exam?     Answer:   Palpitations     Order Specific Question:   Reason for exam?     Answer:   AFib   • ECG 12 Lead     This order was created via procedure documentation       Stop Coreg start Lisinopril     Requested Prescriptions     Signed Prescriptions Disp Refills   • lisinopril (PRINIVIL,ZESTRIL) 5 MG tablet 90 tablet 3     Sig: Take 1 tablet by mouth Daily.      Discussed results of prior testing with patient  Patient expressed understanding  Encouraged and answered all questions   Discussed with the patient and all questioned fully answered. He will call me if any problems arise.        If has significant tachy sridevi syndrome will need a pacer in future     Check BP and heart rates twice daily at least 3x / week at home and bring a recording for me to review next visit  If BP >140/90 or < 100/60 persistently over 3 reading 30 mins apart call sooner            Return in about 5 weeks (around 8/14/2020).

## 2020-07-13 ENCOUNTER — HOSPITAL ENCOUNTER (OUTPATIENT)
Dept: CT IMAGING | Facility: HOSPITAL | Age: 70
Discharge: HOME | End: 2020-07-13
Admitting: INTERNAL MEDICINE

## 2020-07-13 VITALS
BODY MASS INDEX: 24.75 KG/M2 | DIASTOLIC BLOOD PRESSURE: 66 MMHG | WEIGHT: 167.13 LBS | HEIGHT: 69 IN | RESPIRATION RATE: 18 BRPM | SYSTOLIC BLOOD PRESSURE: 128 MMHG | TEMPERATURE: 98 F | OXYGEN SATURATION: 97 % | HEART RATE: 42 BPM

## 2020-07-13 DIAGNOSIS — I25.6 SILENT MYOCARDIAL ISCHEMIA: ICD-10-CM

## 2020-07-13 DIAGNOSIS — R06.09 DOE (DYSPNEA ON EXERTION): ICD-10-CM

## 2020-07-13 DIAGNOSIS — N13.8 BPH WITH URINARY OBSTRUCTION: ICD-10-CM

## 2020-07-13 DIAGNOSIS — N40.1 BPH WITH URINARY OBSTRUCTION: Primary | ICD-10-CM

## 2020-07-13 DIAGNOSIS — N40.1 BPH WITH URINARY OBSTRUCTION: ICD-10-CM

## 2020-07-13 DIAGNOSIS — N13.8 BPH WITH URINARY OBSTRUCTION: Primary | ICD-10-CM

## 2020-07-13 LAB
CREAT SERPL-MCNC: 0.67 MG/DL (ref 0.76–1.27)
GFR SERPL CREATININE-BSD FRML MDRD: 117 ML/MIN/1.73

## 2020-07-13 PROCEDURE — 0 IOPAMIDOL PER 1 ML: Performed by: INTERNAL MEDICINE

## 2020-07-13 PROCEDURE — 75574 CT ANGIO HRT W/3D IMAGE: CPT | Performed by: INTERNAL MEDICINE

## 2020-07-13 PROCEDURE — 82565 ASSAY OF CREATININE: CPT | Performed by: INTERNAL MEDICINE

## 2020-07-13 PROCEDURE — 63710000001 NITROGLYCERIN 0.4 MG SUBLINGUAL TABLET 25 EACH BOTTLE: Performed by: INTERNAL MEDICINE

## 2020-07-13 PROCEDURE — A9270 NON-COVERED ITEM OR SERVICE: HCPCS | Performed by: INTERNAL MEDICINE

## 2020-07-13 PROCEDURE — 75574 CT ANGIO HRT W/3D IMAGE: CPT

## 2020-07-13 RX ORDER — NITROGLYCERIN 0.4 MG/1
0.4 TABLET SUBLINGUAL
Status: COMPLETED | OUTPATIENT
Start: 2020-07-13 | End: 2020-07-13

## 2020-07-13 RX ORDER — SODIUM CHLORIDE 0.9 % (FLUSH) 0.9 %
10 SYRINGE (ML) INJECTION AS NEEDED
Status: DISCONTINUED | OUTPATIENT
Start: 2020-07-13 | End: 2020-07-14 | Stop reason: HOSPADM

## 2020-07-13 RX ORDER — METOPROLOL TARTRATE 5 MG/5ML
5 INJECTION INTRAVENOUS
Status: DISCONTINUED | OUTPATIENT
Start: 2020-07-13 | End: 2020-07-14 | Stop reason: HOSPADM

## 2020-07-13 RX ORDER — LIDOCAINE HYDROCHLORIDE 10 MG/ML
5 INJECTION, SOLUTION EPIDURAL; INFILTRATION; INTRACAUDAL; PERINEURAL AS NEEDED
Status: DISCONTINUED | OUTPATIENT
Start: 2020-07-13 | End: 2020-07-14 | Stop reason: HOSPADM

## 2020-07-13 RX ORDER — SODIUM CHLORIDE 0.9 % (FLUSH) 0.9 %
3 SYRINGE (ML) INJECTION EVERY 12 HOURS SCHEDULED
Status: DISCONTINUED | OUTPATIENT
Start: 2020-07-13 | End: 2020-07-14 | Stop reason: HOSPADM

## 2020-07-13 RX ORDER — METOPROLOL TARTRATE 100 MG/1
100 TABLET ORAL ONCE AS NEEDED
Status: DISCONTINUED | OUTPATIENT
Start: 2020-07-13 | End: 2020-07-14 | Stop reason: HOSPADM

## 2020-07-13 RX ORDER — METOPROLOL TARTRATE 50 MG/1
50 TABLET, FILM COATED ORAL ONCE AS NEEDED
Status: DISCONTINUED | OUTPATIENT
Start: 2020-07-13 | End: 2020-07-14 | Stop reason: HOSPADM

## 2020-07-13 RX ADMIN — NITROGLYCERIN 0.4 MG: 0.4 TABLET SUBLINGUAL at 13:11

## 2020-07-13 RX ADMIN — IOPAMIDOL 100 ML: 755 INJECTION, SOLUTION INTRAVENOUS at 13:05

## 2020-07-14 LAB — PSA SERPL-MCNC: 0.19 NG/ML (ref 0–4)

## 2020-07-22 NOTE — PROGRESS NOTES
Subjective    Mr. Schulz is 70 y.o. male    Chief Complaint: BPH    History of Present Illness  70-year-old male established patient annual follow-up for enlarged prostate.  Severity worsening on tamsulosin.  Quality is urgency and frequency and incomplete emptying.  Associated symptoms he denies gross hematuria, dysuria, or flank pain.  Context he also has ED he does not desire treatment at this time.  Onset gradual.  Timing chronic.  His PSA this year is normal 0.19.  He has a history of microhematuria and had a normal work-up in 2016 by Dr. De La Cruz.    I spent time today reviewing and summarizing old records last urology clinic visit with Dr. De La Cruz 4/22/2019.      Lab Results   Component Value Date    PSA 0.192 07/13/2020    PSA 0.26 02/17/2020    PSA 0.2 04/22/2019         The following portions of the patient's history were reviewed and updated as appropriate: allergies, current medications, past family history, past medical history, past social history, past surgical history and problem list.    Review of Systems   Constitutional: Negative for chills and fever.   Gastrointestinal: Negative for abdominal pain, anal bleeding and blood in stool.   Genitourinary: Negative for dysuria, frequency, hematuria and urgency.   All other systems reviewed and are negative.        Current Outpatient Medications:   •  apixaban (ELIQUIS) 5 MG tablet tablet, Take 5 mg by mouth 2 (Two) Times a Day., Disp: , Rfl:   •  dronedarone (Multaq) 400 MG tablet, Take 1 tablet by mouth 2 (Two) Times a Day With Meals., Disp: 60 tablet, Rfl: 11  •  levothyroxine (SYNTHROID, LEVOTHROID) 125 MCG tablet, , Disp: , Rfl:   •  lisinopril (PRINIVIL,ZESTRIL) 5 MG tablet, Take 1 tablet by mouth Daily., Disp: 90 tablet, Rfl: 3  •  QUEtiapine (SEROquel) 50 MG tablet, 1 TABLET(S) BY MOUTH NIGHTLY, Disp: , Rfl: 3  •  tamsulosin (FLOMAX) 0.4 MG capsule 24 hr capsule, Take 1 capsule by mouth every night at bedtime., Disp: 90 capsule, Rfl: 3  •  carvedilol  "(COREG) 3.125 MG tablet, Take 1 tablet by mouth 2 (Two) Times a Day., Disp: 60 tablet, Rfl: 11  •  finasteride (PROSCAR) 5 MG tablet, Take 1 tablet by mouth Daily., Disp: 90 tablet, Rfl: 3    Past Medical History:   Diagnosis Date   • Acute retention of urine    • Atrial fibrillation (CMS/HCC)    • BPH (benign prostatic hyperplasia)    • Hyperthyroidism    • Microscopic hematuria    • Peyronie's disease        Past Surgical History:   Procedure Laterality Date   • CARPAL TUNNEL RELEASE     • CLAVICLE SURGERY     • THYROIDECTOMY     • THYROIDECTOMY     • TOTAL HIP ARTHROPLASTY     • VASECTOMY         Social History     Socioeconomic History   • Marital status:      Spouse name: Not on file   • Number of children: Not on file   • Years of education: Not on file   • Highest education level: Not on file   Tobacco Use   • Smoking status: Former Smoker   • Smokeless tobacco: Never Used   Substance and Sexual Activity   • Alcohol use: Yes   • Sexual activity: Defer       Family History   Problem Relation Age of Onset   • Heart disease Father    • Heart attack Father    • No Known Problems Mother    • Heart attack Brother    • Heart disease Brother    • Heart disease Paternal Grandfather    • Heart attack Paternal Grandfather        Objective    Temp 97.6 °F (36.4 °C)   Ht 175.3 cm (69\")   Wt 78 kg (172 lb)   BMI 25.40 kg/m²     Physical Exam  Constitutional: Well nourished, Well developed; No apparent distress.  His vital signs are reviewed  Psychiatric: Appropriate affect; Alert and oriented  Eyes: Unremarkable  Musculoskeletal: Normal gait and station  GI: Abdomen is soft, non-tender  Respiratory: No distress; Unlabored movement; No accessory musculature needed with symmetric movements  Skin: No pallor or diaphoresis  ; Penis and testicles are normal; Prostate 30 mL without nodule      Results for orders placed or performed in visit on 07/29/20   POC Urinalysis Dipstick, Multipro   Result Value Ref Range    " Color Yellow Yellow, Straw, Dark Yellow, Simona    Clarity, UA Clear Clear    Glucose, UA Negative Negative, 1000 mg/dL (3+) mg/dL    Bilirubin Negative Negative    Ketones, UA Negative Negative    Specific Gravity  1.010 1.005 - 1.030    Blood, UA Trace (A) Negative    pH, Urine 7.0 5.0 - 8.0    Protein, POC Negative Negative mg/dL    Urobilinogen, UA Normal Normal    Nitrite, UA Negative Negative    Leukocytes Negative Negative     International Prostate Symptom Score  The following is posted based on patient questionnaire answers:  0 - not at all    1-7 mild symptoms  1- Less than one time in five  8-19 moderate symptoms  2 -Less than half the time  20-35 severe symptoms  3 - About half the time  4 - More than half the time  5 - Almost always     For following sections:  Incomplete Emptying: - How often have you had the sensation  of not emptying your bladder completely after you finished urinating?  2  Frequency: -How often have you had to urinate again less than   two hours after you finished urinating?      1  Intermittency: -How often have you found you stopped and started again  Several times when you urinate?       3  Urgency: -How often do you find it difficult to postpone urination?             1  Weak stream: - How often have you had a weak urinary stream?             1  Straining: - How often have you had to push or strain to begin  Urination?          1  Sleeping: -How many times did you most typically get up to urinate   From the time you went to bed at night until the time you got up in the   1  Morning          Total `  10    Quality of Life  How would you feel if you had to live with your urinary condition the way   2  It is now, no better, no worse for the rest of your life?    Where: 0=delighted; 1= pleased, 2= mostly satisfied, 3= mixed, 4 = mostly  Dissatisfied, 5= Unhappy, 6 = terrible    Assessment and Plan    Diagnoses and all orders for this visit:    BPH with urinary obstruction  -     POC  Urinalysis Dipstick, Multipro  -     tamsulosin (FLOMAX) 0.4 MG capsule 24 hr capsule; Take 1 capsule by mouth every night at bedtime.  -     finasteride (PROSCAR) 5 MG tablet; Take 1 tablet by mouth Daily.    Urine frequency  -     PSA DIAGNOSTIC; Future    Microscopic hematuria    PAF (paroxysmal atrial fibrillation) (CMS/HCC)      Worsening LUTS on tamsulosin with normal PSA and exam.  We discussed options for observation versus adding finasteride for combination therapy.  He would like to start finasteride.  I refilled his tamsulosin today.  Follow-up in 1 year with pre-clinic PSA or sooner as needed.      This document has been signed by KRISTOPHER Neal MD on July 29, 2020 08:11

## 2020-07-29 ENCOUNTER — OFFICE VISIT (OUTPATIENT)
Dept: UROLOGY | Facility: CLINIC | Age: 70
End: 2020-07-29

## 2020-07-29 VITALS — WEIGHT: 172 LBS | HEIGHT: 69 IN | TEMPERATURE: 97.6 F | BODY MASS INDEX: 25.48 KG/M2

## 2020-07-29 DIAGNOSIS — N40.1 BPH WITH URINARY OBSTRUCTION: Primary | ICD-10-CM

## 2020-07-29 DIAGNOSIS — R35.0 URINE FREQUENCY: ICD-10-CM

## 2020-07-29 DIAGNOSIS — R31.29 MICROSCOPIC HEMATURIA: ICD-10-CM

## 2020-07-29 DIAGNOSIS — N13.8 BPH WITH URINARY OBSTRUCTION: Primary | ICD-10-CM

## 2020-07-29 DIAGNOSIS — I48.0 PAF (PAROXYSMAL ATRIAL FIBRILLATION) (HCC): ICD-10-CM

## 2020-07-29 LAB
BILIRUB BLD-MCNC: NEGATIVE MG/DL
CLARITY, POC: CLEAR
COLOR UR: YELLOW
GLUCOSE UR STRIP-MCNC: NEGATIVE MG/DL
KETONES UR QL: NEGATIVE
LEUKOCYTE EST, POC: NEGATIVE
NITRITE UR-MCNC: NEGATIVE MG/ML
PH UR: 7 [PH] (ref 5–8)
PROT UR STRIP-MCNC: NEGATIVE MG/DL
RBC # UR STRIP: ABNORMAL /UL
SP GR UR: 1.01 (ref 1–1.03)
UROBILINOGEN UR QL: NORMAL

## 2020-07-29 PROCEDURE — 99214 OFFICE O/P EST MOD 30 MIN: CPT | Performed by: UROLOGY

## 2020-07-29 PROCEDURE — 81003 URINALYSIS AUTO W/O SCOPE: CPT | Performed by: UROLOGY

## 2020-07-29 RX ORDER — TAMSULOSIN HYDROCHLORIDE 0.4 MG/1
1 CAPSULE ORAL
Qty: 90 CAPSULE | Refills: 3 | Status: ON HOLD | OUTPATIENT
Start: 2020-07-29 | End: 2022-12-05 | Stop reason: SDUPTHER

## 2020-07-29 RX ORDER — FINASTERIDE 5 MG/1
5 TABLET, FILM COATED ORAL DAILY
Qty: 90 TABLET | Refills: 3 | Status: SHIPPED | OUTPATIENT
Start: 2020-07-29 | End: 2020-11-13

## 2020-08-14 ENCOUNTER — OFFICE VISIT (OUTPATIENT)
Dept: CARDIOLOGY | Facility: CLINIC | Age: 70
End: 2020-08-14

## 2020-08-14 VITALS
OXYGEN SATURATION: 99 % | WEIGHT: 169 LBS | DIASTOLIC BLOOD PRESSURE: 60 MMHG | HEART RATE: 52 BPM | SYSTOLIC BLOOD PRESSURE: 112 MMHG | BODY MASS INDEX: 25.03 KG/M2 | HEIGHT: 69 IN

## 2020-08-14 DIAGNOSIS — I47.1 PAROXYSMAL SVT (SUPRAVENTRICULAR TACHYCARDIA) (HCC): Primary | ICD-10-CM

## 2020-08-14 DIAGNOSIS — I47.29 NSVT (NONSUSTAINED VENTRICULAR TACHYCARDIA) (HCC): ICD-10-CM

## 2020-08-14 DIAGNOSIS — I48.0 PAF (PAROXYSMAL ATRIAL FIBRILLATION) (HCC): ICD-10-CM

## 2020-08-14 DIAGNOSIS — R31.29 MICROSCOPIC HEMATURIA: ICD-10-CM

## 2020-08-14 DIAGNOSIS — N13.8 BPH WITH URINARY OBSTRUCTION: ICD-10-CM

## 2020-08-14 DIAGNOSIS — E03.9 HYPOTHYROIDISM (ACQUIRED): ICD-10-CM

## 2020-08-14 DIAGNOSIS — I51.9 LV DYSFUNCTION: ICD-10-CM

## 2020-08-14 DIAGNOSIS — N40.1 BPH WITH URINARY OBSTRUCTION: ICD-10-CM

## 2020-08-14 PROCEDURE — 93000 ELECTROCARDIOGRAM COMPLETE: CPT | Performed by: INTERNAL MEDICINE

## 2020-08-14 PROCEDURE — 99214 OFFICE O/P EST MOD 30 MIN: CPT | Performed by: INTERNAL MEDICINE

## 2020-08-14 NOTE — PROGRESS NOTES
David Schulz  [id number]  1950  70 y.o.  male        Referring Provider: Ingrid Vergara MD    Reason for  Visit:    Here for routine follow up   Initial visit for virtual visit using above modality   Echo in [facility] LVEF 42%   Low risk stress test   Saw Dr Emmanuel in past   ECG today marked sinus bradycardia     Cardiac workup test results as below   Coronary CT angiography report as below:non obstructive coronary artery disease       Subjective        Overall feels better after stopping Coreg   No new events or complaints since last visit   Overall the patient feels no major change from baseline symptoms   Similar symptoms as during last visit     Tolerating current medications well with no untoward side effects   Compliant with prescribed medication regimen. Tries to adhere to cardiac diet.      Mild chronic exertional shortness of breath on exertion relieved with rest  No significant cough or wheezing    Intermittent palpitations, once every several weeks lasting for several hours  No associated symptoms of dizziness, weakness, chest pain,  shortness of breath      No associated chest pain  No significant pedal edema    No fever or chills  No significant expectoration    No hemoptysis  No presyncope or syncope    Tolerating current medications well with no untoward side effects   Compliant with prescribed medication regimen. Tries to adhere to cardiac diet.   Joint pain in small, medium and large joints     September 2019 was admitted to [facility] for Paroxysmal atrial fibrillation     Intermittent dizzy spells, no presyncope or syncope     BP elevated at home last visit   BP well controlled at home.     Reviewed home BP recordings        History of present illness:  David Schulz is a 70 y.o. yo male with history of Paroxysmal atrial fibrillation  who presents today for   Chief Complaint   Patient presents with   • Atrial Fibrillation     1 month follow up    .    History  Past Medical  History:   Diagnosis Date   • Acute retention of urine    • Atrial fibrillation (CMS/HCC)    • BPH (benign prostatic hyperplasia)    • Hyperthyroidism    • Microscopic hematuria    • Peyronie's disease    ,   Past Surgical History:   Procedure Laterality Date   • CARPAL TUNNEL RELEASE     • CLAVICLE SURGERY     • THYROIDECTOMY     • THYROIDECTOMY     • TOTAL HIP ARTHROPLASTY     • VASECTOMY     ,   Family History   Problem Relation Age of Onset   • Heart disease Father    • Heart attack Father    • No Known Problems Mother    • Heart attack Brother    • Heart disease Brother    • Heart disease Paternal Grandfather    • Heart attack Paternal Grandfather    ,   Social History     Tobacco Use   • Smoking status: Former Smoker   • Smokeless tobacco: Never Used   Substance Use Topics   • Alcohol use: Yes   • Drug use: Not on file   ,     Medications  Current Outpatient Medications   Medication Sig Dispense Refill   • apixaban (ELIQUIS) 5 MG tablet tablet Take 5 mg by mouth 2 (Two) Times a Day.     • carvedilol (COREG) 3.125 MG tablet Take 1 tablet by mouth 2 (Two) Times a Day. 60 tablet 11   • dronedarone (Multaq) 400 MG tablet Take 1 tablet by mouth 2 (Two) Times a Day With Meals. 60 tablet 11   • finasteride (PROSCAR) 5 MG tablet Take 1 tablet by mouth Daily. 90 tablet 3   • levothyroxine (SYNTHROID, LEVOTHROID) 125 MCG tablet      • lisinopril (PRINIVIL,ZESTRIL) 5 MG tablet Take 1 tablet by mouth Daily. 90 tablet 3   • QUEtiapine (SEROquel) 50 MG tablet 1 TABLET(S) BY MOUTH NIGHTLY  3   • tamsulosin (FLOMAX) 0.4 MG capsule 24 hr capsule Take 1 capsule by mouth every night at bedtime. 90 capsule 3     No current facility-administered medications for this visit.        Allergies:  Morphine and Morphine and related    Review of Systems  Review of Systems   Constitution: Positive for malaise/fatigue.   HENT: Negative.    Eyes: Negative.    Cardiovascular: Positive for dyspnea on exertion and palpitations. Negative for  "chest pain, claudication, cyanosis, irregular heartbeat, leg swelling, near-syncope, orthopnea, paroxysmal nocturnal dyspnea and syncope.   Respiratory: Negative.    Endocrine: Negative.    Hematologic/Lymphatic: Negative.    Skin: Negative.    Musculoskeletal: Positive for arthritis.   Gastrointestinal: Negative for anorexia.   Genitourinary: Negative.    Neurological: Positive for dizziness and light-headedness.   Psychiatric/Behavioral: Negative.        Objective     Physical Exam:  /60   Pulse 52   Ht 175.3 cm (69\")   Wt 76.7 kg (169 lb)   SpO2 99%   BMI 24.96 kg/m²      Physical Exam   Constitutional: He appears well-developed.   HENT:   Head: Normocephalic.   Neck: Normal carotid pulses and no JVD present. No tracheal tenderness present. Carotid bruit is not present. No tracheal deviation and no edema present.   Cardiovascular: Regular rhythm, normal heart sounds and normal pulses.   Pulmonary/Chest: Effort normal. No stridor.   Abdominal: Soft. He exhibits no distension. There is no hepatosplenomegaly. There is no tenderness.   Neurological: He is alert. He has normal strength. No cranial nerve deficit or sensory deficit.   Skin: Skin is warm.   Psychiatric: He has a normal mood and affect. His speech is normal and behavior is normal.          Results Review:      David Schulz   Holter Monitor - 24 Hour - Record/Scan Analysis With Report/Interp ([id number]) Clinic   Order# [id number]   Reading physician: Jose Manuel Akhtar MD Ordering physician: Jose Manuel Akhtar MD Study date: 20   Patient Information     Patient Name  David Schulz MRN  [id number] Sex  Male  (Age)  1950 (70 y.o.)   Interpretation Summary        · Average HR: 56. Min HR: 41. Max HR: 187.     · Entire report was reviewed.  Monitoring in days: ~1   · The predominant rhythm noted during the testing period was sinus rhythm.     · Occasional supraventricular ectopics with an APC burden of: 1.7 %  · 7 runs of supraventricular " tachycardia longest 12 beats at a maximum rate of 162 bpm and an average of 132 bpm         · Rare premature ventricular contractions with a PVC burden of: < 1%  · Single 4 beat run of nonsustained ventricular tachycardia at a maximum rate of 187 bpm and an average of 131 bpm     · No correlated arrhythmia  · No significant pauses           Conclusion: Baseline rhythm is sinus.  Occasional supraventricular ectopics with an APC burden of 1.7%  7 runs of supraventricular tachycardia longest 12 beats at a maximum rate of 162 bpm and an average rate of 132 bpm  Single 4 beat run of nonsustained ventricular tachycardia at a maximum rate of 187 bpm and an average of 131 bpm            Cardiac CT angiography 7/15/2020     Impression:      1. Total calcium score : 34.6  2. Mild focal calcification of the left anterior descending coronary artery without any high-grade stenotic lesions noted in any of the epicardial coronary arteries.  3.  Slight myocardial bridging noted of the mid left anterior descending coronary artery       David Schulz   Holter Monitor 72Hr-21Day (0296T/0298T)   Order# [id number]   Reading physician: Jose Manuel Akhtar MD Ordering physician: Jose Manuel Akhtar MD Study date: 20   Patient Information     Patient Name  David Schulz MRN  [id number] Sex  Male  (Age)  1950 (70 y.o.)   Interpretation Summary        · Average HR: 54. Min HR: 41. Max HR: 179.     · Entire report was reviewed.  Monitoring in days: ~13   · The predominant rhythm noted during the testing period was sinus rhythm.     · Rare supraventricular ectopics with an APC burden of: < 1%  · 65 runs of supraventricular tachycardia longest 11.4 seconds at a maximum rate of 179 bpm and an average rate of 138 bpm         · Rare premature ventricular contractions with a PVC burden of: < 1%  · 2 runs of ventricular tachycardia longest 9 beats at a maximum rate of 162 bpm and an average rate of 143 bpm     · No correlated arrhythmia  · No  significant pauses           Conclusion: Baseline rhythm is sinus.  Slowest heart rate of 41 bpm consisting of sinus bradycardia at 12:53 PM  65 runs of supraventricular tachycardia longest 11.4 seconds at a maximum rate of 179 bpm and an average rate of 138 bpm  2 runs of nonsustained ventricular tachycardia longest 9 beats at a maximum rate of 162 bpm and an average rate of 143 bpm            Results for orders placed during the hospital encounter of 06/08/20   Adult Transthoracic Echo Limited W/ Cont if Necessary Per Protocol    Narrative · Estimated EF = 70%.  · Left ventricular systolic function is normal.  · Left atrial cavity size is mildly dilated.  · No evidence of pulmonary hypertension is present.         Study Location: Echo Lab  Technical Quality: Adequate visualization    Patient Status: Inpatient    Rhythm: Atrial fibrillation     Conclusions     Summary   Mitral valve leaflets are mildly thickened with preserved leaflet   mobility.   Trace mitral regurgitation.   Mildly thickened aortic valve leaflets with preserved leaflet mobility.   Aortic valve appears to be tricuspid.   No evidence of aortic stenosis; trivial aortic regurgitation.   Trivial tricuspid regurgitation with estimated RVSP of 19 mm Hg.   Mildly dilated left atrium.   Left ventricular size is normal .   Mild concentric left ventricular hypertrophy.   mildly decreased ejection fraction.   Unable to obtain EF due to rhythm.   Left ventricular ejection fraction is estimated at 42%.     Signature     ----------------------------------------------------------------   Electronically signed by Robin Hitchcock MD(Interpreting   physician) on 09/01/2019 05:24 PM   ----------------------------------------------------------------       ECG 12 Lead  Date/Time: 8/14/2020 10:22 AM  Performed by: Jose Manuel Akhtar MD  Authorized by: Jose Manuel Akhtar MD   Comparison: compared with previous ECG from 7/10/2020  Comparison to previous ECG: Ventricular rate  increased from 46  to 52  beats per minute    Rhythm: sinus bradycardia  Rate: bradycardic  Conduction: conduction normal  ST Segments: ST segments normal  T Waves: T waves normal  QRS axis: normal  Other: no other findings    Clinical impression: abnormal EKG            Assessment/Plan   David was seen today for atrial fibrillation.    Diagnoses and all orders for this visit:    Paroxysmal SVT (supraventricular tachycardia) (CMS/HCC)    PAF (paroxysmal atrial fibrillation) (CMS/HCC)    NSVT (nonsustained ventricular tachycardia) (CMS/[facility])    LV dysfunction LVEF 42% 9/2019 Yelitza Hitchcock    Microscopic hematuria    Hypothyroidism (acquired)    BPH with urinary obstruction    Other orders  -     ECG 12 Lead        ____________________________________________________________________________________________________________________________________________  Health maintenance and recommendations      Similar recommendations as last visit       Offered to give patient  a copy of my notes       Questions were encouraged, asked and answered to the patient's  understanding and satisfaction. Questions if any regarding current medications and side effects, need for refills and importance of compliance to medications stressed.    Reviewed available prior notes, consults, prior visits, laboratory findings, radiology and cardiology relevant reports. Updated chart as applicable. I have reviewed the patient's medical history in detail and updated the computerized patient record as relevant.      Updated patient regarding any new or relevant abnormalities on review of records or any new findings on physical exam. Mentioned to patient about purpose of visit and desirable health short and long term goals and objectives.    Primary to monitor CBC CMP Lipid panel and TSH as  applicable    ___________________________________________________________________________________________________________________________________________      Plan      The current medical regimen is effective;  continue present plan and medications.   He has stopped Coreg     Discussed results of prior testing with patient  Patient expressed understanding  Encouraged and answered all questions   Discussed with the patient and all questioned fully answered. He will call me if any problems arise.     If has significant tachy sridevi syndrome will need a pacer in future     Check BP and heart rates twice daily at least 3x / week at home and bring a recording for me to review next visit  If BP >140/90 or < 100/60 persistently over 3 reading 30 mins apart call sooner   Follow up with Becka GRADY to address interim issues             Return in about 3 months (around 11/14/2020).

## 2020-08-31 RX ORDER — APIXABAN 5 MG/1
TABLET, FILM COATED ORAL
Qty: 60 TABLET | Refills: 5 | Status: SHIPPED | OUTPATIENT
Start: 2020-08-31

## 2020-09-21 ENCOUNTER — TELEPHONE (OUTPATIENT)
Dept: CARDIOLOGY | Facility: CLINIC | Age: 70
End: 2020-09-21

## 2020-09-21 NOTE — TELEPHONE ENCOUNTER
----- Message from Lina Nguyen MA sent at 9/21/2020  8:12 AM CDT -----  Regarding: FW: Prescription Question  Contact: [phone]    ----- Message -----  From: David Schulz  Sent: 9/20/2020   1:40 PM CDT  To: [facility] Clinical Pool  Subject: Prescription Question                            Titus Akhtar,    I have been experiencing problems that could be side effects from my meds. For the past couple of weeks, I have been frequently experiencing  stomach/abdominal pain and cramps. Some days I am constipated with hard, very dark stool. One day my stool was black. Other days I have experienced diarrhea. Some evenings my left ankle and foot are a little swollen and my legs below the knees feel tight and heavy .  I am am also feeling more fatigued than usual. Sometimes I feel light headed. I do not have a fever or a cough.     Thank you,  David Schulz

## 2020-09-22 NOTE — TELEPHONE ENCOUNTER
Okay to hold Coreg and see if his symptoms improve  Monitor blood pressures  Keep follow-up with YSABEL Cannon  Can be seen sooner by us if ongoing symptoms

## 2020-11-13 PROBLEM — I42.8 NON-ISCHEMIC CARDIOMYOPATHY: Status: ACTIVE | Noted: 2020-05-28

## 2020-11-13 PROBLEM — Z79.01 CURRENT USE OF LONG TERM ANTICOAGULATION: Status: ACTIVE | Noted: 2020-11-13

## 2020-11-13 NOTE — PROGRESS NOTES
"Subjective:     Encounter Date:11/16/2020      Patient ID: David Schulz is a 70 y.o. male   HPI: This patient presents today for routine follow-up.  He has a history of paroxysmal atrial fibrillation on chronic anticoagulation, paroxysmal supraventricular tachycardia, nonsustained ventricular tachycardia, nonischemic cardiomyopathy and hypothyroidism.  He reports dyspnea with moderate to heavy exertion. He denies palpitations, however he reports during times of dyspnea he checks his pulse and it feels \"iregular\". Patient denies chest pain, palpitations, dizziness, syncope, orthopnea, PND, edema or decreased stamina.  Patient denies any signs of bleeding.    Chief Complaint: Routine follow-up  Atrial Fibrillation  Presents for follow-up visit. Symptoms are negative for an AICD problem, bradycardia, chest pain, dizziness, hemodynamic instability, hypertension, hypotension, pacemaker problem, palpitations, shortness of breath, syncope, tachycardia and weakness. The symptoms have been stable. Past medical history includes atrial fibrillation. There are no medication compliance problems.   Cardiomyopathy  This is a chronic problem. The current episode started more than 1 month ago. Pertinent negatives include no abdominal pain, anorexia, arthralgias, change in bowel habit, chest pain, chills, congestion, coughing, diaphoresis, fatigue, fever, headaches, joint swelling, myalgias, nausea, neck pain, numbness, rash, sore throat, swollen glands, urinary symptoms, vertigo, visual change, vomiting or weakness.       Previous Cardiac Testing:  Results for orders placed during the hospital encounter of 06/08/20   Adult Transthoracic Echo Limited W/ Cont if Necessary Per Protocol    Narrative · Estimated EF = 70%.  · Left ventricular systolic function is normal.  · Left atrial cavity size is mildly dilated.  · No evidence of pulmonary hypertension is present.        No results found for this or any previous visit.      The " following portions of the patient's history were reviewed and updated as appropriate: allergies, current medications, past family history, past medical history, past social history, past surgical history and problem list.    Allergies   Allergen Reactions   • Morphine Itching   • Morphine And Related        Current Outpatient Medications:   •  apixaban (ELIQUIS) 5 MG tablet tablet, Take 5 mg by mouth 2 (Two) Times a Day., Disp: , Rfl:   •  levothyroxine (SYNTHROID, LEVOTHROID) 125 MCG tablet, , Disp: , Rfl:   •  lisinopril (PRINIVIL,ZESTRIL) 5 MG tablet, Take 1 tablet by mouth Daily., Disp: 90 tablet, Rfl: 3  •  QUEtiapine (SEROquel) 50 MG tablet, 1 TABLET(S) BY MOUTH NIGHTLY, Disp: , Rfl: 3  •  tamsulosin (FLOMAX) 0.4 MG capsule 24 hr capsule, Take 1 capsule by mouth every night at bedtime., Disp: 90 capsule, Rfl: 3  Past Medical History:   Diagnosis Date   • Acute retention of urine    • Atrial fibrillation (CMS/HCC)    • BPH (benign prostatic hyperplasia)    • Hyperthyroidism    • Microscopic hematuria    • Peyronie's disease      Past Surgical History:   Procedure Laterality Date   • CARPAL TUNNEL RELEASE     • CLAVICLE SURGERY     • THYROIDECTOMY     • THYROIDECTOMY     • TOTAL HIP ARTHROPLASTY     • VASECTOMY       Family History   Problem Relation Age of Onset   • Heart disease Father    • Heart attack Father    • No Known Problems Mother    • Heart attack Brother    • Heart disease Brother    • Heart disease Paternal Grandfather    • Heart attack Paternal Grandfather      Social History     Socioeconomic History   • Marital status:      Spouse name: Not on file   • Number of children: Not on file   • Years of education: Not on file   • Highest education level: Not on file   Tobacco Use   • Smoking status: Former Smoker   • Smokeless tobacco: Never Used   Substance and Sexual Activity   • Alcohol use: Yes   • Sexual activity: Defer       Review of Systems   Constitution: Negative for chills, decreased  appetite, diaphoresis, fatigue, fever, malaise/fatigue, night sweats, weight gain and weight loss.   HENT: Negative for congestion, nosebleeds and sore throat.    Eyes: Negative for visual disturbance.   Cardiovascular: Positive for dyspnea on exertion. Negative for chest pain, leg swelling, near-syncope, orthopnea, palpitations, paroxysmal nocturnal dyspnea and syncope.   Respiratory: Negative for cough, hemoptysis, shortness of breath, snoring and wheezing.    Endocrine: Negative for cold intolerance and heat intolerance.   Hematologic/Lymphatic: Does not bruise/bleed easily.   Skin: Negative for rash.   Musculoskeletal: Negative for arthralgias, back pain, falls, joint swelling, myalgias and neck pain.   Gastrointestinal: Negative for abdominal pain, anorexia, change in bowel habit, constipation, diarrhea, dysphagia, heartburn, nausea and vomiting.   Genitourinary: Negative for hematuria.   Neurological: Negative for dizziness, headaches, light-headedness, numbness, vertigo and weakness.   Psychiatric/Behavioral: Negative for altered mental status.   Allergic/Immunologic: Negative for persistent infections.              Objective:     Vitals signs and nursing note reviewed.   Constitutional:       General: Awake.      Appearance: Normal and healthy appearance. Well-developed, normal weight and not in distress.   Eyes:      General: Lids are normal.      Conjunctiva/sclera: Conjunctivae normal.      Pupils: Pupils are equal, round, and reactive to light.   HENT:      Head: Normocephalic and atraumatic.      Nose: Nose normal.   Neck:      Musculoskeletal: Normal range of motion.      Vascular: No JVR. JVD normal.   Pulmonary:      Effort: Pulmonary effort is normal.      Breath sounds: Normal breath sounds. No wheezing. No rhonchi. No rales.   Chest:      Chest wall: Not tender to palpatation.   Cardiovascular:      PMI at left midclavicular line. Bradycardia present. Regular rhythm. Normal S1. Normal S2.       "Murmurs: There is no murmur.      No gallop. No click. No rub.   Pulses:     Intact distal pulses.   Edema:     Peripheral edema absent.   Abdominal:      General: Bowel sounds are normal.      Palpations: Abdomen is soft.      Tenderness: There is no abdominal tenderness.   Musculoskeletal: Normal range of motion.         General: No tenderness.   Skin:     General: Skin is warm and dry.   Neurological:      General: No focal deficit present.      Mental Status: Alert, oriented to person, place, and time and oriented to person, place and time.   Psychiatric:         Attention and Perception: Attention and perception normal.         Mood and Affect: Mood and affect normal.         Speech: Speech normal.         Behavior: Behavior normal. Behavior is cooperative.         Thought Content: Thought content normal.         Cognition and Memory: Cognition and memory normal.         Judgment: Judgment normal.             ECG 12 Lead    Date/Time: 11/16/2020 9:33 AM  Performed by: Becka Draper APRN  Authorized by: Becka Draper APRN   Comparison: compared with previous ECG from 8/14/2020  Rhythm: sinus bradycardia  Rate: bradycardic  BPM: 53    Clinical impression: abnormal EKG          /70   Pulse 53   Ht 175.3 cm (69\")   Wt 75.8 kg (167 lb)   SpO2 98%   BMI 24.66 kg/m²   Lab Review:   Lab Results   Component Value Date    WBC 3.8 (L) 06/06/2020    HGB 13.5 (L) 06/06/2020    HCT 38.4 (L) 06/06/2020    MCV 94.1 (H) 06/06/2020     06/06/2020     Lab Results   Component Value Date    GLUCOSE 94 02/17/2020    BUN 9 02/17/2020    CREATININE 0.67 (L) 07/13/2020    EGFRIFNONA 117 07/13/2020    K 4.1 02/17/2020    CO2 28 02/17/2020    CALCIUM 8.4 (L) 02/17/2020    ALBUMIN 4.4 02/17/2020    AST 18 02/17/2020    ALT 12 02/17/2020     Lab Results   Component Value Date    CHLPL 192 02/17/2020    CHLPL 198 09/01/2019     Lab Results   Component Value Date    TRIG 63 02/17/2020    TRIG 83 09/01/2019     Lab " Results   Component Value Date    HDL 79 02/17/2020    HDL 71 09/01/2019     Lab Results   Component Value Date     02/17/2020     09/01/2019       I have reviewed the most recent lab results.       Assessment:          Diagnosis Plan   1. PAF (paroxysmal atrial fibrillation) (CMS/[facility])  In sinus rhythm today. Pt has been on Multaq in the past, however this was discontinued due to side effects. Unable to tolerate betablocker due to bradycardia. Anticoagulated. Discussed repeat holter monitor to evaluate for afib burden and potential initiation of anti-arrhythmic, however pt declines at this time.    2. Current use of long term anticoagulation  On Eliquis. Denies bleeding.    3. Non-ischemic cardiomyopathy (CMS/[facility])  Previously reduced EF now improved to 70%. No clinical signs or symptoms of heart failure.    4. Paroxysmal SVT (supraventricular tachycardia) (CMS/HCC)  No known recent recurrence.    5. NSVT (nonsustained ventricular tachycardia) (CMS/[facility])  No known recent recurrence.    6. Hypothyroidism (acquired)  Managed by PCP.           Plan:         1. Continue medications as previously prescribed.  2. Report any worsening symptoms.  3. Report any signs of bleeding.  4. Continue heart healthy diet and regular exercise as tolerated.   5. Follow up with PCP for blood pressure and cholesterol management and routine lab work.  6. Follow up in three months, or sooner if needed. Consider repeat holter monitor, if symptoms continue or worsen.     Advance Care Planning   ACP discussion was held with the patient during this visit. Patient does not have an advance directive, information provided.

## 2020-11-16 ENCOUNTER — OFFICE VISIT (OUTPATIENT)
Dept: CARDIOLOGY | Facility: CLINIC | Age: 70
End: 2020-11-16

## 2020-11-16 VITALS
HEART RATE: 53 BPM | BODY MASS INDEX: 24.73 KG/M2 | WEIGHT: 167 LBS | HEIGHT: 69 IN | OXYGEN SATURATION: 98 % | DIASTOLIC BLOOD PRESSURE: 70 MMHG | SYSTOLIC BLOOD PRESSURE: 122 MMHG

## 2020-11-16 DIAGNOSIS — I42.8 NON-ISCHEMIC CARDIOMYOPATHY (HCC): ICD-10-CM

## 2020-11-16 DIAGNOSIS — Z79.01 CURRENT USE OF LONG TERM ANTICOAGULATION: ICD-10-CM

## 2020-11-16 DIAGNOSIS — E03.9 HYPOTHYROIDISM (ACQUIRED): ICD-10-CM

## 2020-11-16 DIAGNOSIS — I47.1 PAROXYSMAL SVT (SUPRAVENTRICULAR TACHYCARDIA) (HCC): ICD-10-CM

## 2020-11-16 DIAGNOSIS — I48.0 PAF (PAROXYSMAL ATRIAL FIBRILLATION) (HCC): Primary | ICD-10-CM

## 2020-11-16 DIAGNOSIS — I47.29 NSVT (NONSUSTAINED VENTRICULAR TACHYCARDIA) (HCC): ICD-10-CM

## 2020-11-16 PROCEDURE — 93000 ELECTROCARDIOGRAM COMPLETE: CPT | Performed by: NURSE PRACTITIONER

## 2020-11-16 PROCEDURE — 99214 OFFICE O/P EST MOD 30 MIN: CPT | Performed by: NURSE PRACTITIONER

## 2020-11-16 NOTE — PATIENT INSTRUCTIONS
Advance Care Planning and Advance Directives     You make decisions on a daily basis - decisions about where you want to live, your career, your home, your life. Perhaps one of the most important decisions you face is your choice for future medical care. Take time to talk with your family and your healthcare team and start planning today.  Advance Care Planning is a process that can help you:  · Understand possible future healthcare decisions in light of your own experiences  · Reflect on those decision in light of your goals and values  · Discuss your decisions with those closest to you and the healthcare professionals that care for you  · Make a plan by creating a document that reflects your wishes    Surrogate Decision Maker  In the event of a medical emergency, which has left you unable to communicate or to make your own decisions, you would need someone to make decisions for you.  It is important to discuss your preferences for medical treatment with this person while you are in good health.     Qualities of a surrogate decision maker:  • Willing to take on this role and responsibility  • Knows what you want for future medical care  • Willing to follow your wishes even if they don't agree with them  • Able to make difficult medical decisions under stressful circumstances    Advance Directives  These are legal documents you can create that will guide your healthcare team and decision maker(s) when needed. These documents can be stored in the electronic medical record.    · Living Will - a legal document to guide your care if you have a terminal condition or a serious illness and are unable to communicate. States vary by statute in document names/types, but most forms may include one or more of the following:        -  Directions regarding life-prolonging treatments        -  Directions regarding artificially provided nutrition/hydration        -  Choosing a healthcare decision maker        -  Direction  regarding organ/tissue donation    · Durable Power of  for Healthcare - this document names an -in-fact to make medical decisions for you, but it may also allow this person to make personal and financial decisions for you. Please seek the advice of an  if you need this type of document.    **Advance Directives are not required and no one may discriminate against you if you do not sign one.    Medical Orders  Many states allow specific forms/orders signed by your physician to record your wishes for medical treatment in your current state of health. This form, signed in personal communication with your physician, addresses resuscitation and other medical interventions that you may or may not want.      For more information or to schedule a time with a [facility] Advance Care Planning Facilitator contact: [email or web address] or call [phone] and someone will contact you directly.

## 2021-02-06 DIAGNOSIS — I48.0 PAROXYSMAL ATRIAL FIBRILLATION (HCC): ICD-10-CM

## 2021-02-08 RX ORDER — APIXABAN 5 MG/1
TABLET, FILM COATED ORAL
Qty: 60 TABLET | Refills: 5 | OUTPATIENT
Start: 2021-02-08

## 2021-02-09 ENCOUNTER — OFFICE VISIT (OUTPATIENT)
Dept: GASTROENTEROLOGY | Age: 71
End: 2021-02-09

## 2021-02-09 VITALS
HEART RATE: 53 BPM | DIASTOLIC BLOOD PRESSURE: 80 MMHG | BODY MASS INDEX: 24.44 KG/M2 | OXYGEN SATURATION: 99 % | SYSTOLIC BLOOD PRESSURE: 133 MMHG | WEIGHT: 165 LBS | HEIGHT: 69 IN

## 2021-02-09 DIAGNOSIS — Z86.010 HISTORY OF COLON POLYPS: ICD-10-CM

## 2021-02-09 DIAGNOSIS — Z12.11 SCREENING FOR COLON CANCER: Primary | ICD-10-CM

## 2021-02-09 PROCEDURE — 99999 PR OFFICE/OUTPT VISIT,PROCEDURE ONLY: CPT | Performed by: NURSE PRACTITIONER

## 2021-02-09 RX ORDER — LISINOPRIL 5 MG/1
10 TABLET ORAL DAILY
COMMUNITY
End: 2022-09-02

## 2021-02-09 ASSESSMENT — ENCOUNTER SYMPTOMS
BLOOD IN STOOL: 0
VOMITING: 0
ABDOMINAL PAIN: 0
RECTAL PAIN: 0
VOICE CHANGE: 0
CONSTIPATION: 0
SORE THROAT: 0
SHORTNESS OF BREATH: 0
NAUSEA: 0
BACK PAIN: 0
DIARRHEA: 0
CHEST TIGHTNESS: 0
COUGH: 0
ABDOMINAL DISTENTION: 0

## 2021-02-09 NOTE — PATIENT INSTRUCTIONS
Schedule colonoscopy. No aspirin, ibuprofen, naproxen, fish oil or vitamin E for 5 days before procedure. Do not eat or drink after midnight the day of the procedure. Allowed medications can be taken with a small sip of water. Please review your prep instructions for allowed medications. You will not be able to drive for 24 hours after the procedure due to sedation. Bring a  with you the day of the procedure. If you are on blood thinners, clearance from the prescribing physician will be obtained before your procedure is scheduled. If it is determined it is not safe to hold these medications for a short time an alternative procedure for evaluation may be recommended. Risks of colonoscopy include, but are not limited to, perforation, bleeding, and infection, Risk of perforation and bleeding are increased if there is a polyp removed. Anesthesia risks will be reviewed with you before the procedure by a member of the anesthesia department. Your physician may also schedule a follow up appointment with the nurse practitioner to discuss pathology, symptoms or to check if you have had any problems related to your procedure. If you prefer not to return to the office after your procedure please discuss this with your physician on the day of your colonoscopy. The physician will talk with you and/or your family after the procedure is completed. Final recommendations are based on the pathologist report if biopsies or specimens are taken. For Colonoscopy: You will be given specific directions regarding restrictions to diet and bowel prep instructions including laxatives. Please read these instructions one week prior to your scheduled procedure to ensure that you are prepared. If you have any questions regarding these instructions please call our office Mon through Fri from 8:00 am to 4:00 pm.     Follow prep instructions provided for bowel prep. Take all of the bowel prep as directed.  If you are having problems with nausea, stop your prep for 30-45 min to allow the nausea to subside before resuming your prep. It is important to drink plenty of fluids throughout the day before taking your laxatives. This will help to protect your kidneys, prevent dehydration and maximize the effect of the bowel prep. If polyps are removed during the procedure they will be sent to a pathologist for analysis. Unless you have a follow up appointment scheduled, you will be notified by mail of the pathology results within 4 weeks. If you have not received results after 4 weeks you may call the office to obtain this information. Your diet before a colonoscopy bowel preparation is very important to ensure a successful colon exam. It is recommended to consider certain changes to your diet three to four days prior to the procedure. Remember that your bowels need to be empty for the exam.    What foods are good to eat? Cut down on heavy solid foods three to four days before the procedure and start introducing lighter meals to your diet. The following food suggestions are a good part of your diet before a colonoscopy bowel preparation. Light meat that is easily digestible such as chicken (without the skin)   Potatoes without skin   Cheese   Eggs   A light meal of steamed white fish   Light clear soups    Foods and drinks to avoid  Avoid foods that contain too much fiber. Stay clear of dark colored beverages. They can stick to the walls of the digestive tract and make it difficult to differentiate from blood. Some of these foods are:  Red meat, rice, nuts and vegetables   Milk, other milk based fluids and cream   Most fruit and puddings   Whole grain pasta   Cereals, bran and seeds   Colored beverages, especially those that are red or purple in color   Red colored Jell-O   On the day before the colonoscopy, continue to drink plenty of clear fluids.  It is important   to keep yourself hydrated before the exam. Please follow all instructions as provided for cleansing the bowel. Failure to have an adequately prepped colon may cause you to have incomplete exam with further testing required.      [email or web address]

## 2021-02-09 NOTE — PROGRESS NOTES
Subjective:      Patient ID: Aaliyah Collazo is a 69 y.o. male  Clay Can MD  No ref. provider found    HPI  Chief Complaint   Patient presents with    Colonoscopy         Patient with history of colon polyps due for screening colonoscopy. The patient denies abdominal or flank pain, anorexia, nausea or vomiting, dysphagia, change in bowel habits or black or bloody stools or weight loss. Last c-scope 5 yr ago. Since last ov he has started BT for a-fib. He states he is currently in rhythm. Has been on BT for over one year with no recent cardiac problems. Assessment:      1. Screening for colon cancer    2. History of colon polyps            Plan:      Schedule colonoscopy    Clearance for discontinuing anticoagulants needed before scheduling procedure. Increased r isks may be associated with discontinuation of this medication including cva, tia, cardiac event. I have discussed this with patient. Patient voices understanding and agrees to proceed with scheduling. Instruct on bowel prep. Nothing to eat or drink after midnight the day of the exam.  Unable to drive for 24 hours after the procedure. No aspirin or nonsteroidal anti-inflammatories for 5 days before procedure. I have discussed the benefits, alternatives, and risks (including bleeding, perforation and death)  for pursuing Endoscopy (EGD/Colonscopy/EUS/ERCP) with the patient and they are willing to continue. We also discussed the need for anesthesia, IV access, proper dietary changes, medication changes if necessary, and need for bowel prep (if ordered) prior to their Endoscopic procedure. They are aware they must have someone accompany them to their scheduled procedure to drive them home - they agree to the above and are willing to continue.      Plan for anesthesia: MAC                      Family History   Problem Relation Age of Onset    Dementia Mother     Obesity Mother     Stroke Father     Heart Attack Father     Other Sister     Other Brother         reheumatic fever- valve issue    Colon Cancer Neg Hx     Colon Polyps Neg Hx        Past Medical History:   Diagnosis Date    Bipolar disorder ([address]     Colon polyps     Hypothyroidism     Insomnia        Past Surgical History:   Procedure Laterality Date    CARPAL TUNNEL RELEASE Right     approx 5 years ago    CLAVICLE SURGERY      x 2    COLONOSCOPY  ? 5-6 years ago        COLONOSCOPY N/A 12/19/2014    Monica:  normal,   5y recall    FOOT SURGERY      x 2    HERNIA REPAIR      JOINT REPLACEMENT Right     hip    THYROIDECTOMY      VARICOSE VEIN SURGERY      VASECTOMY         Current Outpatient Medications   Medication Sig Dispense Refill    lisinopril (PRINIVIL;ZESTRIL) 5 MG tablet Take 5 mg by mouth daily      ELIQUIS 5 MG TABS tablet TAKE 1 TABLET BY MOUTH TWICE A DAY 60 tablet 5    ZINC PO Take by mouth      VITAMIN E PO Take by mouth      tamsulosin (FLOMAX) 0.4 MG capsule Take 0.4 mg by mouth nightly       Cholecalciferol (VITAMIN D) 2000 units CAPS capsule Take 2,000 Units by mouth daily      QUEtiapine (SEROQUEL) 50 MG tablet Take 50 mg by mouth nightly.  levothyroxine (SYNTHROID) 137 MCG tablet Take 137 mcg by mouth Daily        No current facility-administered medications for this visit. Allergies   Allergen Reactions    Morphine Itching    Coreg [Carvedilol] Nausea And Vomiting        reports that he has never smoked. He has never used smokeless tobacco. He reports current alcohol use of about 3.0 standard drinks of alcohol per week. He reports that he does not use drugs. Review of Systems   Constitutional: Negative for appetite change, fever and unexpected weight change. HENT: Negative for sore throat and voice change. Respiratory: Negative for cough, chest tightness and shortness of breath. Cardiovascular: Negative for chest pain, palpitations and leg swelling.    Gastrointestinal: Negative for abdominal distention, abdominal pain, blood in stool, constipation, diarrhea, nausea, rectal pain and vomiting. Musculoskeletal: Negative for arthralgias, back pain and gait problem. Skin: Negative for pallor, rash and wound. Neurological: Negative for dizziness, weakness and light-headedness. Hematological: Negative for adenopathy. Does not bruise/bleed easily. All other systems reviewed and are negative. Objective:   Physical Exam  Constitutional:       General: He is not in acute distress. Appearance: Normal appearance. He is well-developed. Comments: /80   Pulse 53   Ht 5' 9\" (1.753 m)   Wt 165 lb (74.8 kg)   SpO2 99%   BMI 24.37 kg/m²    Cardiovascular:      Rate and Rhythm: Normal rate and regular rhythm. Heart sounds: No murmur. Pulmonary:      Effort: Pulmonary effort is normal. No respiratory distress. Breath sounds: Normal breath sounds. Abdominal:      General: Bowel sounds are normal. There is no distension. Palpations: Abdomen is soft. There is no mass. Tenderness: There is no abdominal tenderness. There is no guarding or rebound. Skin:     General: Skin is warm and dry. Coloration: Skin is not pale. Neurological:      Mental Status: He is alert and oriented to person, place, and time.

## 2021-02-12 ENCOUNTER — IMMUNIZATION (OUTPATIENT)
Dept: VACCINE CLINIC | Facility: HOSPITAL | Age: 71
End: 2021-02-12

## 2021-02-12 PROCEDURE — 91301 HC SARSCO02 VAC 100MCG/0.5ML IM: CPT | Performed by: OBSTETRICS & GYNECOLOGY

## 2021-02-12 PROCEDURE — 0011A: CPT | Performed by: OBSTETRICS & GYNECOLOGY

## 2021-02-18 ENCOUNTER — TELEPHONE (OUTPATIENT)
Dept: GASTROENTEROLOGY | Age: 71
End: 2021-02-18

## 2021-02-18 ENCOUNTER — TELEPHONE (OUTPATIENT)
Dept: CARDIOLOGY | Facility: CLINIC | Age: 71
End: 2021-02-18

## 2021-02-18 NOTE — TELEPHONE ENCOUNTER
Patient is having a colonoscopy done on 02/23/21 and he is currently on Eliquis.    LOV- 11/16/21     Please advise.

## 2021-02-18 NOTE — TELEPHONE ENCOUNTER
Flaco,    Patient left voice mail today stating he needs to r/s his colon for next week 2/23/21 with Dr Rachel Harmon. Please call @ [phone]. Carlos sending to you also if Flaco isn't here.

## 2021-02-24 ENCOUNTER — TELEPHONE (OUTPATIENT)
Dept: GASTROENTEROLOGY | Age: 71
End: 2021-02-24

## 2021-02-24 PROBLEM — I48.0 PAF (PAROXYSMAL ATRIAL FIBRILLATION) (HCC): Chronic | Status: ACTIVE | Noted: 2020-05-28

## 2021-02-24 PROBLEM — I42.8 NON-ISCHEMIC CARDIOMYOPATHY (HCC): Chronic | Status: ACTIVE | Noted: 2020-05-28

## 2021-02-24 NOTE — TELEPHONE ENCOUNTER
Patient: Katy Miller    YOB: 1950      Clearance for Colonoscopy was received on February 24, 2021. Patient may discontinue the use of ELIQUIS for five days prior to the procedure. There  is NOT a Lovenox requirement. See attached clearance and scheduling forms.

## 2021-02-24 NOTE — PROGRESS NOTES
"Subjective:     Encounter Date:02/25/2021      Patient ID: David Schulz is a 70 y.o. male   HPI: This patient presents today for routine follow-up.  He has a history of paroxysmal atrial fibrillation on chronic anticoagulation, paroxysmal supraventricular tachycardia, nonsustained ventricular tachycardia, nonischemic cardiomyopathy and hypothyroidism.  He reports dyspnea with moderate to heavy exertion. He reports intermittent palpitations that seem to occur more frequently in the mornings. He reports intermittent episodes of dizziness as well. He complains of bilateral lower extremities feeling \"cold\". Patient denies chest pain, syncope, orthopnea, PND, edema or decreased stamina.  Patient denies any signs of bleeding.    Chief Complaint: Routine follow-up  Atrial Fibrillation  Presents for follow-up visit. Symptoms include dizziness and palpitations. Symptoms are negative for an AICD problem, bradycardia, chest pain, hemodynamic instability, hypertension, hypotension, pacemaker problem, shortness of breath, syncope, tachycardia and weakness. The symptoms have been stable. Past medical history includes atrial fibrillation. There are no medication compliance problems.   Cardiomyopathy  This is a chronic problem. The current episode started more than 1 month ago. Pertinent negatives include no abdominal pain, anorexia, arthralgias, change in bowel habit, chest pain, chills, congestion, coughing, diaphoresis, fatigue, fever, headaches, joint swelling, myalgias, nausea, neck pain, numbness, rash, sore throat, swollen glands, urinary symptoms, vertigo, visual change, vomiting or weakness.       Previous Cardiac Testing:  Results for orders placed during the hospital encounter of 06/08/20   Adult Transthoracic Echo Limited W/ Cont if Necessary Per Protocol    Narrative · Estimated EF = 70%.  · Left ventricular systolic function is normal.  · Left atrial cavity size is mildly dilated.  · No evidence of pulmonary " hypertension is present.        No results found for this or any previous visit.      The following portions of the patient's history were reviewed and updated as appropriate: allergies, current medications, past family history, past medical history, past social history, past surgical history and problem list.    Allergies   Allergen Reactions   • Morphine Itching   • Morphine And Related        Current Outpatient Medications:   •  apixaban (ELIQUIS) 5 MG tablet tablet, Take 5 mg by mouth 2 (Two) Times a Day., Disp: , Rfl:   •  levothyroxine (SYNTHROID, LEVOTHROID) 125 MCG tablet, , Disp: , Rfl:   •  lisinopril (PRINIVIL,ZESTRIL) 5 MG tablet, Take 1 tablet by mouth Daily., Disp: 90 tablet, Rfl: 3  •  QUEtiapine (SEROquel) 50 MG tablet, 1 TABLET(S) BY MOUTH NIGHTLY, Disp: , Rfl: 3  •  tamsulosin (FLOMAX) 0.4 MG capsule 24 hr capsule, Take 1 capsule by mouth every night at bedtime., Disp: 90 capsule, Rfl: 3  Past Medical History:   Diagnosis Date   • Acute retention of urine    • Atrial fibrillation (CMS/HCC)    • BPH (benign prostatic hyperplasia)    • Hyperthyroidism    • Microscopic hematuria    • Peyronie's disease      Past Surgical History:   Procedure Laterality Date   • CARPAL TUNNEL RELEASE     • CLAVICLE SURGERY     • THYROIDECTOMY     • THYROIDECTOMY     • TOTAL HIP ARTHROPLASTY     • VASECTOMY       Family History   Problem Relation Age of Onset   • Heart disease Father    • Heart attack Father    • No Known Problems Mother    • Heart attack Brother    • Heart disease Brother    • Heart disease Paternal Grandfather    • Heart attack Paternal Grandfather      Social History     Socioeconomic History   • Marital status:      Spouse name: Not on file   • Number of children: Not on file   • Years of education: Not on file   • Highest education level: Not on file   Tobacco Use   • Smoking status: Former Smoker   • Smokeless tobacco: Never Used   Substance and Sexual Activity   • Alcohol use: Yes   •  Drug use: Never   • Sexual activity: Defer       Review of Systems   Constitution: Negative for chills, decreased appetite, diaphoresis, fatigue, fever, malaise/fatigue, night sweats, weight gain and weight loss.   HENT: Negative for congestion, nosebleeds and sore throat.    Eyes: Negative for visual disturbance.   Cardiovascular: Positive for dyspnea on exertion and palpitations. Negative for chest pain, leg swelling, near-syncope, orthopnea, paroxysmal nocturnal dyspnea and syncope.   Respiratory: Negative for cough, hemoptysis, shortness of breath, snoring and wheezing.    Endocrine: Negative for cold intolerance and heat intolerance.   Hematologic/Lymphatic: Does not bruise/bleed easily.   Skin: Negative for rash.   Musculoskeletal: Negative for arthralgias, back pain, falls, joint swelling, myalgias and neck pain.   Gastrointestinal: Negative for abdominal pain, anorexia, change in bowel habit, constipation, diarrhea, dysphagia, heartburn, nausea and vomiting.   Genitourinary: Negative for hematuria.   Neurological: Positive for dizziness. Negative for headaches, light-headedness, numbness, vertigo and weakness.   Psychiatric/Behavioral: Negative for altered mental status.   Allergic/Immunologic: Negative for persistent infections.              Objective:     Vitals signs and nursing note reviewed.   Constitutional:       General: Awake.      Appearance: Normal and healthy appearance. Well-developed, normal weight and not in distress.   Eyes:      General: Lids are normal.      Conjunctiva/sclera: Conjunctivae normal.      Pupils: Pupils are equal, round, and reactive to light.   HENT:      Head: Normocephalic and atraumatic.      Nose: Nose normal.   Neck:      Musculoskeletal: Normal range of motion.      Vascular: No JVR. JVD normal.   Pulmonary:      Effort: Pulmonary effort is normal.      Breath sounds: Normal breath sounds. No wheezing. No rhonchi. No rales.   Chest:      Chest wall: Not tender to  "palpatation.   Cardiovascular:      PMI at left midclavicular line. Bradycardia present. Regular rhythm. Normal S1. Normal S2.      Murmurs: There is no murmur.      No gallop. No click. No rub.   Pulses:     Intact distal pulses.   Edema:     Peripheral edema absent.   Abdominal:      General: Bowel sounds are normal.      Palpations: Abdomen is soft.      Tenderness: There is no abdominal tenderness.   Musculoskeletal: Normal range of motion.         General: No tenderness.   Skin:     General: Skin is warm and dry.   Neurological:      General: No focal deficit present.      Mental Status: Alert, oriented to person, place, and time and oriented to person, place and time.   Psychiatric:         Attention and Perception: Attention and perception normal.         Mood and Affect: Mood and affect normal.         Speech: Speech normal.         Behavior: Behavior normal. Behavior is cooperative.         Thought Content: Thought content normal.         Cognition and Memory: Cognition and memory normal.         Judgment: Judgment normal.             ECG 12 Lead    Date/Time: 2/25/2021 9:25 AM  Performed by: Becka Chirinos APRN  Authorized by: Becka Chirinos APRN   Comparison: compared with previous ECG from 11/16/2020  Similar to previous ECG  Rhythm: sinus bradycardia  Rate: bradycardic  BPM: 52  Q waves: II, III, aVF, V5 and V6    T inversion: III    Clinical impression: abnormal EKG          /72   Pulse 52   Ht 175.3 cm (69\")   Wt 76.7 kg (169 lb)   SpO2 99%   BMI 24.96 kg/m²   Lab Review:   Lab Results   Component Value Date    WBC 3.8 (L) 06/06/2020    HGB 13.5 (L) 06/06/2020    HCT 38.4 (L) 06/06/2020    MCV 94.1 (H) 06/06/2020     06/06/2020     Lab Results   Component Value Date    GLUCOSE 94 02/17/2020    BUN 9 02/17/2020    CREATININE 0.67 (L) 07/13/2020    EGFRIFNONA 117 07/13/2020    K 4.1 02/17/2020    CO2 28 02/17/2020    CALCIUM 8.4 (L) 02/17/2020    ALBUMIN 4.4 02/17/2020    AST " 18 02/17/2020    ALT 12 02/17/2020     Lab Results   Component Value Date    CHLPL 192 02/17/2020    CHLPL 198 09/01/2019     Lab Results   Component Value Date    TRIG 63 02/17/2020    TRIG 83 09/01/2019     Lab Results   Component Value Date    HDL 79 02/17/2020    HDL 71 09/01/2019     Lab Results   Component Value Date     02/17/2020     09/01/2019       I have reviewed the most recent lab results.       Assessment:          Diagnosis Plan   1. PAF (paroxysmal atrial fibrillation) (CMS/HCC)  In sinus rhythm today. Pt has been on Multaq in the past, however this was discontinued due to side effects. Unable to tolerate betablocker due to bradycardia. Anticoagulated. 7 day epatch.    2. Current use of long term anticoagulation  On Eliquis. Denies bleeding.    3. Non-ischemic cardiomyopathy (CMS/HCC)  Previously reduced EF now improved to 70%. No clinical signs or symptoms of heart failure.    4. Paroxysmal SVT (supraventricular tachycardia) (CMS/HCC)  No known recent recurrence.    5. NSVT (nonsustained ventricular tachycardia) (CMS/HCC)  No known recent recurrence.    6. Hypothyroidism (acquired)  Managed by PCP.           Plan:         1. Continue medications as previously prescribed.  2. Report any worsening symptoms.  3. Report any signs of bleeding.  4. Continue heart healthy diet and regular exercise as tolerated.   5. Follow up with PCP for blood pressure and cholesterol management and routine lab work.  6. Follow up in one month, or sooner if needed.   7. 7 day Epatch.   8. ABIs checked in office today with normal results as follows: Right ARIA- 1.37 and Left ARIA-1.32 with overall ARIA- 1.32. Could consider diagnostic imaging of bilateral lower extremities, if symptoms persist or worsen.

## 2021-02-25 ENCOUNTER — OFFICE VISIT (OUTPATIENT)
Dept: CARDIOLOGY | Facility: CLINIC | Age: 71
End: 2021-02-25

## 2021-02-25 VITALS
BODY MASS INDEX: 25.03 KG/M2 | OXYGEN SATURATION: 99 % | HEART RATE: 52 BPM | SYSTOLIC BLOOD PRESSURE: 116 MMHG | DIASTOLIC BLOOD PRESSURE: 72 MMHG | WEIGHT: 169 LBS | HEIGHT: 69 IN

## 2021-02-25 DIAGNOSIS — I47.29 NSVT (NONSUSTAINED VENTRICULAR TACHYCARDIA) (HCC): ICD-10-CM

## 2021-02-25 DIAGNOSIS — Z79.01 CURRENT USE OF LONG TERM ANTICOAGULATION: ICD-10-CM

## 2021-02-25 DIAGNOSIS — I47.1 PAROXYSMAL SVT (SUPRAVENTRICULAR TACHYCARDIA) (HCC): ICD-10-CM

## 2021-02-25 DIAGNOSIS — I48.0 PAF (PAROXYSMAL ATRIAL FIBRILLATION) (HCC): Primary | ICD-10-CM

## 2021-02-25 DIAGNOSIS — I42.8 NON-ISCHEMIC CARDIOMYOPATHY (HCC): Chronic | ICD-10-CM

## 2021-02-25 PROCEDURE — 99214 OFFICE O/P EST MOD 30 MIN: CPT | Performed by: NURSE PRACTITIONER

## 2021-02-25 PROCEDURE — 93000 ELECTROCARDIOGRAM COMPLETE: CPT | Performed by: NURSE PRACTITIONER

## 2021-03-12 ENCOUNTER — IMMUNIZATION (OUTPATIENT)
Dept: VACCINE CLINIC | Facility: HOSPITAL | Age: 71
End: 2021-03-12

## 2021-03-12 PROCEDURE — 91301 HC SARSCO02 VAC 100MCG/0.5ML IM: CPT | Performed by: OBSTETRICS & GYNECOLOGY

## 2021-03-12 PROCEDURE — 0012A: CPT | Performed by: OBSTETRICS & GYNECOLOGY

## 2021-03-24 NOTE — PROGRESS NOTES
"Subjective:     Encounter Date:03/25/2021      Patient ID: David Schulz is a 70 y.o. male   HPI: This patient presents today for routine follow-up of outpatient testing.  Holter monitor revealed occasional premature atrial contractions with PAC burden of 2.6%, 26 runs of supraventricular tachycardia with longest duration of 24 beats at a maximum rate of 146 bpm, no episodes of atrial fibrillation and no significant pauses.  He has a history of paroxysmal atrial fibrillation on chronic anticoagulation, paroxysmal supraventricular tachycardia, nonsustained ventricular tachycardia, nonischemic cardiomyopathy and hypothyroidism.  He reports dyspnea with moderate to heavy exertion. He reports intermittent palpitations that seem to occur more frequently in the mornings. He reports intermittent episodes of dizziness as well. He complains of bilateral lower extremities feeling \"cold\". Patient denies chest pain, syncope, orthopnea, PND, edema or decreased stamina.  Patient denies any signs of bleeding.    Chief Complaint: Routine follow-up  Atrial Fibrillation  Presents for follow-up visit. Symptoms include dizziness and palpitations. Symptoms are negative for an AICD problem, bradycardia, chest pain, hemodynamic instability, hypertension, hypotension, pacemaker problem, shortness of breath, syncope, tachycardia and weakness. The symptoms have been stable. Past medical history includes atrial fibrillation. There are no medication compliance problems.   Cardiomyopathy  This is a chronic problem. The current episode started more than 1 month ago. Pertinent negatives include no abdominal pain, anorexia, arthralgias, change in bowel habit, chest pain, chills, congestion, coughing, diaphoresis, fatigue, fever, headaches, joint swelling, myalgias, nausea, neck pain, numbness, rash, sore throat, swollen glands, urinary symptoms, vertigo, visual change, vomiting or weakness.       Previous Cardiac Testing:  Results for orders " placed during the hospital encounter of 06/08/20    Adult Transthoracic Echo Limited W/ Cont if Necessary Per Protocol    Interpretation Summary  · Estimated EF = 70%.  · Left ventricular systolic function is normal.  · Left atrial cavity size is mildly dilated.  · No evidence of pulmonary hypertension is present.    No results found for this or any previous visit.      The following portions of the patient's history were reviewed and updated as appropriate: allergies, current medications, past family history, past medical history, past social history, past surgical history and problem list.    Allergies   Allergen Reactions   • Morphine Itching   • Morphine And Related        Current Outpatient Medications:   •  apixaban (ELIQUIS) 5 MG tablet tablet, Take 1 tablet by mouth Every 12 (Twelve) Hours., Disp: 60 tablet, Rfl: 11  •  levothyroxine (SYNTHROID, LEVOTHROID) 125 MCG tablet, , Disp: , Rfl:   •  lisinopril (PRINIVIL,ZESTRIL) 5 MG tablet, Take 1 tablet by mouth Daily., Disp: 90 tablet, Rfl: 3  •  multivitamin with minerals (MULTIVITAMIN ADULT PO), Take 1 tablet by mouth Daily., Disp: , Rfl:   •  QUEtiapine (SEROquel) 50 MG tablet, 1 TABLET(S) BY MOUTH NIGHTLY, Disp: , Rfl: 3  •  tamsulosin (FLOMAX) 0.4 MG capsule 24 hr capsule, Take 1 capsule by mouth every night at bedtime., Disp: 90 capsule, Rfl: 3  Past Medical History:   Diagnosis Date   • Acute retention of urine    • Atrial fibrillation (CMS/HCC)    • BPH (benign prostatic hyperplasia)    • Hyperthyroidism    • Microscopic hematuria    • Peyronie's disease      Past Surgical History:   Procedure Laterality Date   • CARPAL TUNNEL RELEASE     • CLAVICLE SURGERY     • THYROIDECTOMY     • THYROIDECTOMY     • TOTAL HIP ARTHROPLASTY     • VASECTOMY       Family History   Problem Relation Age of Onset   • Heart disease Father    • Heart attack Father    • No Known Problems Mother    • Heart attack Brother    • Heart disease Brother    • Heart disease Paternal  Grandfather    • Heart attack Paternal Grandfather      Social History     Socioeconomic History   • Marital status:      Spouse name: Not on file   • Number of children: Not on file   • Years of education: Not on file   • Highest education level: Not on file   Tobacco Use   • Smoking status: Former Smoker   • Smokeless tobacco: Never Used   Vaping Use   • Vaping Use: Never used   Substance and Sexual Activity   • Alcohol use: Yes   • Drug use: Never   • Sexual activity: Defer       Review of Systems   Constitutional: Negative for chills, decreased appetite, diaphoresis, fatigue, fever, malaise/fatigue, night sweats, weight gain and weight loss.   HENT: Negative for congestion, nosebleeds and sore throat.    Eyes: Negative for visual disturbance.   Cardiovascular: Positive for dyspnea on exertion and palpitations. Negative for chest pain, leg swelling, near-syncope, orthopnea, paroxysmal nocturnal dyspnea and syncope.   Respiratory: Negative for cough, hemoptysis, shortness of breath, snoring and wheezing.    Endocrine: Negative for cold intolerance and heat intolerance.   Hematologic/Lymphatic: Does not bruise/bleed easily.   Skin: Negative for rash.   Musculoskeletal: Negative for arthralgias, back pain, falls, joint swelling, myalgias and neck pain.   Gastrointestinal: Negative for abdominal pain, anorexia, change in bowel habit, constipation, diarrhea, dysphagia, heartburn, nausea and vomiting.   Genitourinary: Negative for hematuria.   Neurological: Positive for dizziness. Negative for headaches, light-headedness, numbness, vertigo and weakness.   Psychiatric/Behavioral: Negative for altered mental status.   Allergic/Immunologic: Negative for persistent infections.              Objective:     Vitals and nursing note reviewed.   Constitutional:       General: Awake.      Appearance: Normal and healthy appearance. Well-developed, normal weight and not in distress.   Eyes:      General: Lids are normal.     "  Conjunctiva/sclera: Conjunctivae normal.      Pupils: Pupils are equal, round, and reactive to light.   HENT:      Head: Normocephalic and atraumatic.      Nose: Nose normal.   Neck:      Vascular: No JVR. JVD normal.   Pulmonary:      Effort: Pulmonary effort is normal.      Breath sounds: Normal breath sounds. No wheezing. No rhonchi. No rales.   Chest:      Chest wall: Not tender to palpatation.   Cardiovascular:      PMI at left midclavicular line. Bradycardia present. Regular rhythm. Normal S1. Normal S2.      Murmurs: There is no murmur.      No gallop. No click. No rub.   Pulses:     Intact distal pulses.   Edema:     Peripheral edema absent.   Abdominal:      General: Bowel sounds are normal.      Palpations: Abdomen is soft.      Tenderness: There is no abdominal tenderness.   Musculoskeletal: Normal range of motion.         General: No tenderness.      Cervical back: Normal range of motion. Skin:     General: Skin is warm and dry.   Neurological:      General: No focal deficit present.      Mental Status: Alert, oriented to person, place, and time and oriented to person, place and time.   Psychiatric:         Attention and Perception: Attention and perception normal.         Mood and Affect: Mood and affect normal.         Speech: Speech normal.         Behavior: Behavior normal. Behavior is cooperative.         Thought Content: Thought content normal.         Cognition and Memory: Cognition and memory normal.         Judgment: Judgment normal.           Procedures  /84   Pulse 55   Ht 175.3 cm (69\")   Wt 77.1 kg (170 lb)   SpO2 98%   BMI 25.10 kg/m²   Lab Review:   Lab Results   Component Value Date    WBC 3.8 (L) 06/06/2020    HGB 13.5 (L) 06/06/2020    HCT 38.4 (L) 06/06/2020    MCV 94.1 (H) 06/06/2020     06/06/2020     Lab Results   Component Value Date    GLUCOSE 94 02/17/2020    BUN 9 02/17/2020    CREATININE 0.67 (L) 07/13/2020    EGFRIFNONA 117 07/13/2020    K 4.1 02/17/2020    " CO2 28 02/17/2020    CALCIUM 8.4 (L) 02/17/2020    ALBUMIN 4.4 02/17/2020    AST 18 02/17/2020    ALT 12 02/17/2020     Lab Results   Component Value Date    CHLPL 192 02/17/2020    CHLPL 198 09/01/2019     Lab Results   Component Value Date    TRIG 63 02/17/2020    TRIG 83 09/01/2019     Lab Results   Component Value Date    HDL 79 02/17/2020    HDL 71 09/01/2019     Lab Results   Component Value Date     02/17/2020     09/01/2019       I have reviewed the most recent lab results.       Assessment:          Diagnosis Plan   1. PAF (paroxysmal atrial fibrillation) (CMS/HCC)  In sinus rhythm today. Pt has been on Multaq in the past, however this was discontinued due to side effects. Unable to tolerate betablocker due to bradycardia. Anticoagulated. No recurrence on recent holter monitor.    2. Current use of long term anticoagulation  On Eliquis. Denies bleeding.    3. Non-ischemic cardiomyopathy (CMS/HCC)  Previously reduced EF now improved to 70%. No clinical signs or symptoms of heart failure.    4. Paroxysmal SVT (supraventricular tachycardia) (CMS/HCC)  26 runs on recent holter monitor. Pt unable to tolerate betablocker. Consider EP referral if episodes increase in frequency or duration.    5. NSVT (nonsustained ventricular tachycardia) (CMS/HCC)  No known recent recurrence.    6. Hypothyroidism (acquired)  Managed by PCP.           Plan:         1. Continue medications as previously prescribed.  2. Report any worsening symptoms.  3. Report any signs of bleeding.  4. Continue heart healthy diet and regular exercise as tolerated.   5. Follow up with PCP for blood pressure and cholesterol management and routine lab work.  6. Follow up in six months, or sooner if needed.

## 2021-03-25 ENCOUNTER — OFFICE VISIT (OUTPATIENT)
Dept: CARDIOLOGY | Facility: CLINIC | Age: 71
End: 2021-03-25

## 2021-03-25 VITALS
HEIGHT: 69 IN | BODY MASS INDEX: 25.18 KG/M2 | HEART RATE: 55 BPM | DIASTOLIC BLOOD PRESSURE: 84 MMHG | WEIGHT: 170 LBS | OXYGEN SATURATION: 98 % | SYSTOLIC BLOOD PRESSURE: 132 MMHG

## 2021-03-25 DIAGNOSIS — I47.29 NSVT (NONSUSTAINED VENTRICULAR TACHYCARDIA) (HCC): ICD-10-CM

## 2021-03-25 DIAGNOSIS — I47.1 PAROXYSMAL SVT (SUPRAVENTRICULAR TACHYCARDIA) (HCC): ICD-10-CM

## 2021-03-25 DIAGNOSIS — I42.8 NON-ISCHEMIC CARDIOMYOPATHY (HCC): Chronic | ICD-10-CM

## 2021-03-25 DIAGNOSIS — E03.9 HYPOTHYROIDISM (ACQUIRED): ICD-10-CM

## 2021-03-25 DIAGNOSIS — I48.0 PAF (PAROXYSMAL ATRIAL FIBRILLATION) (HCC): Primary | Chronic | ICD-10-CM

## 2021-03-25 DIAGNOSIS — Z79.01 CURRENT USE OF LONG TERM ANTICOAGULATION: ICD-10-CM

## 2021-03-25 PROCEDURE — 99214 OFFICE O/P EST MOD 30 MIN: CPT | Performed by: NURSE PRACTITIONER

## 2021-03-25 RX ORDER — MULTIPLE VITAMINS W/ MINERALS TAB 9MG-400MCG
1 TAB ORAL DAILY
COMMUNITY
End: 2022-10-05

## 2021-03-29 ENCOUNTER — OFFICE VISIT (OUTPATIENT)
Dept: UROLOGY | Age: 71
End: 2021-03-29
Payer: MEDICARE

## 2021-03-29 VITALS — HEIGHT: 69 IN | TEMPERATURE: 98 F | BODY MASS INDEX: 25.77 KG/M2 | WEIGHT: 174 LBS

## 2021-03-29 DIAGNOSIS — K92.1 BLOOD IN THE STOOL: ICD-10-CM

## 2021-03-29 DIAGNOSIS — K59.00 CONSTIPATION IN MALE: Primary | ICD-10-CM

## 2021-03-29 DIAGNOSIS — N40.1 BENIGN PROSTATIC HYPERPLASIA WITH WEAK URINARY STREAM: Primary | ICD-10-CM

## 2021-03-29 DIAGNOSIS — R39.12 BENIGN PROSTATIC HYPERPLASIA WITH WEAK URINARY STREAM: Primary | ICD-10-CM

## 2021-03-29 LAB
APPEARANCE FLUID: CLEAR
BILIRUBIN, POC: NORMAL
BLOOD URINE, POC: NORMAL
CLARITY, POC: CLEAR
COLOR, POC: YELLOW
GLUCOSE URINE, POC: NORMAL
KETONES, POC: NORMAL
LEUKOCYTE EST, POC: NORMAL
NITRITE, POC: NORMAL
PH, POC: 7
PROTEIN, POC: NORMAL
SPECIFIC GRAVITY, POC: 1.01
UROBILINOGEN, POC: 0.2

## 2021-03-29 PROCEDURE — 1036F TOBACCO NON-USER: CPT | Performed by: UROLOGY

## 2021-03-29 PROCEDURE — G8417 CALC BMI ABV UP PARAM F/U: HCPCS | Performed by: UROLOGY

## 2021-03-29 PROCEDURE — G8482 FLU IMMUNIZE ORDER/ADMIN: HCPCS | Performed by: UROLOGY

## 2021-03-29 PROCEDURE — 51798 US URINE CAPACITY MEASURE: CPT | Performed by: UROLOGY

## 2021-03-29 PROCEDURE — G8427 DOCREV CUR MEDS BY ELIG CLIN: HCPCS | Performed by: UROLOGY

## 2021-03-29 PROCEDURE — 3017F COLORECTAL CA SCREEN DOC REV: CPT | Performed by: UROLOGY

## 2021-03-29 PROCEDURE — 4040F PNEUMOC VAC/ADMIN/RCVD: CPT | Performed by: UROLOGY

## 2021-03-29 PROCEDURE — 99203 OFFICE O/P NEW LOW 30 MIN: CPT | Performed by: UROLOGY

## 2021-03-29 PROCEDURE — 1123F ACP DISCUSS/DSCN MKR DOCD: CPT | Performed by: UROLOGY

## 2021-03-29 PROCEDURE — 81002 URINALYSIS NONAUTO W/O SCOPE: CPT | Performed by: UROLOGY

## 2021-03-29 RX ORDER — TAMSULOSIN HYDROCHLORIDE 0.4 MG/1
0.4 CAPSULE ORAL NIGHTLY
Qty: 90 CAPSULE | Refills: 3 | Status: SHIPPED | OUTPATIENT
Start: 2021-03-29 | End: 2021-11-01 | Stop reason: SDUPTHER

## 2021-03-29 RX ORDER — MULTIVITAMIN,THER AND MINERALS
1 TABLET ORAL DAILY
COMMUNITY

## 2021-03-29 ASSESSMENT — ENCOUNTER SYMPTOMS
VOMITING: 0
EYE REDNESS: 0
COLOR CHANGE: 0
FACIAL SWELLING: 0
SHORTNESS OF BREATH: 0
NAUSEA: 0
CHEST TIGHTNESS: 0
EYE DISCHARGE: 0
TROUBLE SWALLOWING: 0

## 2021-03-29 NOTE — PROGRESS NOTES
Emery Jolly is a 69 y.o. male who presents today   Chief Complaint   Patient presents with    New Patient     I was referred here today for bph. Benign Prostatic Hypertrophy  Patient complains of lower urinary tract symptoms. He reports frequency, incomplete emptying, intermittency, nocturia one time a night, straining, urgency and weak stream. He denies Dysuria or hematuria. . Patient states symptoms are of moderate severity. Onset of symptoms was a few years ago and was gradual in onset. He states his symptoms are essentially unchanged he is here just to get established with care. His AUA Symptom Score is, 12/35 manifested as irritative symptoms including frequency, urgency, nocturia and obstructive symptoms including incomplete emptying, intermittency, weak stream. He has no personal history and no family history of prostate cancer. He reports a history of Constipation painful bowel movements and bleeding. He denies flank pain, gross hematuria, kidney stones and recurrent UTI. Time was spent reviewing and summarizing previous records from outside urologist Dr. Chel Rivas and Dr. Jenny Pastor at [facility] urology.     Past Medical History:   Diagnosis Date    Bipolar disorder ([facility] [address]     Colon polyps     Hypothyroidism     Insomnia        Past Surgical History:   Procedure Laterality Date    CARPAL TUNNEL RELEASE Right     approx 5 years ago    CLAVICLE SURGERY      x 2    COLONOSCOPY  ? 5-6 years ago        COLONOSCOPY N/A 12/19/2014    Monica:  normal,   5y recall    FOOT SURGERY      x 2    HERNIA REPAIR      JOINT REPLACEMENT Right     hip    THYROIDECTOMY      VARICOSE VEIN SURGERY      VASECTOMY         Current Outpatient Medications   Medication Sig Dispense Refill    Vitamins/Minerals TABS Take 1 tablet by mouth daily      tamsulosin (FLOMAX) 0.4 MG capsule Take 1 capsule by mouth nightly 90 capsule 3    lisinopril (PRINIVIL;ZESTRIL) 5 MG tablet Take 5 mg by mouth daily      ELIQUIS 5 MG TABS tablet TAKE 1 TABLET BY MOUTH TWICE A DAY 60 tablet 5    QUEtiapine (SEROQUEL) 50 MG tablet Take 50 mg by mouth nightly.  levothyroxine (SYNTHROID) 137 MCG tablet Take 137 mcg by mouth Daily        No current facility-administered medications for this visit. Allergies   Allergen Reactions    Morphine Itching    Coreg [Carvedilol] Nausea And Vomiting       Social History     Socioeconomic History    Marital status: Single     Spouse name: None    Number of children: None    Years of education: None    Highest education level: None   Occupational History    None   Social Needs    Financial resource strain: None    Food insecurity     Worry: None     Inability: None    Transportation needs     Medical: None     Non-medical: None   Tobacco Use    Smoking status: Never Smoker    Smokeless tobacco: Never Used   Substance and Sexual Activity    Alcohol use:  Yes     Alcohol/week: 3.0 standard drinks     Types: 3 Glasses of wine per week    Drug use: No    Sexual activity: None   Lifestyle    Physical activity     Days per week: None     Minutes per session: None    Stress: None   Relationships    Social connections     Talks on phone: None     Gets together: None     Attends Church service: None     Active member of club or organization: None     Attends meetings of clubs or organizations: None     Relationship status: None    Intimate partner violence     Fear of current or ex partner: None     Emotionally abused: None     Physically abused: None     Forced sexual activity: None   Other Topics Concern    None   Social History Narrative    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 There is no mass. Tenderness: There is no abdominal tenderness. Hernia: There is no hernia in the left inguinal area. Genitourinary:     Penis: Normal and circumcised. No phimosis or discharge. Testes: Normal.         Right: Mass, tenderness or swelling not present. Left: Mass, tenderness or swelling not present. Prostate: Normal. Not enlarged, not tender and no nodules present. Rectum: Tenderness and internal hemorrhoid present. No external hemorrhoid. Musculoskeletal: Normal range of motion. General: No tenderness. Lymphadenopathy:      Cervical: No cervical adenopathy. Skin:     General: Skin is warm and dry. Neurological:      Mental Status: He is alert and oriented to person, place, and time. Psychiatric:         Behavior: Behavior normal.             DATA:  CMP:    Lab Results   Component Value Date     06/06/2020     06/03/2011    K 4.4 06/06/2020    K 4.1 06/03/2011     06/06/2020     06/03/2011    CO2 26 06/06/2020    BUN 11 06/06/2020    CREATININE 0.6 06/06/2020    CREATININE 0.9 06/03/2011    LABGLOM >60 06/06/2020    GLUCOSE 105 06/06/2020    PROT 6.3 06/06/2020    PROT 7.0 06/03/2011    LABALBU 4.4 06/06/2020    LABALBU 4.6 06/03/2011    CALCIUM 8.1 06/06/2020    BILITOT 0.9 06/06/2020    ALKPHOS 52 06/06/2020    ALKPHOS 54 06/03/2011    AST 14 06/06/2020    ALT 11 06/06/2020     Results for orders placed or performed in visit on 03/29/21   POCT Urinalysis no Micro   Result Value Ref Range    Color, UA YELLOW     Clarity, UA CLEAR     Glucose, UA POC NEG     Bilirubin, UA NEG     Ketones, UA NEG     Spec Grav, UA 1.015     Blood, UA POC TRACE     pH, UA 7.0     Protein, UA POC NEG     Urobilinogen, UA 0.2     Leukocytes, UA NEG     Nitrite, UA NEG     Appearance, Fluid Clear Clear, Slightly Cloudy     Lab Results   Component Value Date    PSA 0.26 02/17/2020       1.  Benign prostatic hyperplasia with weak urinary stream  He reports his symptoms are not changed. - DC Measure, post-void residual, US, non-imaging  - POCT Urinalysis no Micro  - tamsulosin (FLOMAX) 0.4 MG capsule; Take 1 capsule by mouth nightly  Dispense: 90 capsule; Refill: 3      Orders Placed This Encounter   Procedures    PSA, Diagnostic     PSA in 6 month     Standing Status:   Future     Standing Expiration Date:   3/29/2022    POCT Urinalysis no Micro    DC Measure, post-void residual, US, non-imaging     Bladder scan 63ml        Return in about 6 months (around 9/29/2021) for PSA prior to vext visit. All information inputted into the note by the MA to include chief complaint, past medical history, past surgical history, medications, allergies, social and family history and review of systems has been reviewed and updated as needed by me. EMR Dragon/transcription disclaimer: Much of this documentt is electronic  transcription/translation of spoken language to printed text. The  electronic translation of spoken language may be erroneous, or at times,  nonsensical words or phrases may be inadvertently transcribed.  Although I  have reviewed the document for such errors, some may still exist.

## 2021-04-01 ENCOUNTER — PATIENT MESSAGE (OUTPATIENT)
Dept: CARDIOLOGY | Facility: CLINIC | Age: 71
End: 2021-04-01

## 2021-04-02 ENCOUNTER — OFFICE VISIT (OUTPATIENT)
Dept: GASTROENTEROLOGY | Age: 71
End: 2021-04-02
Payer: MEDICARE

## 2021-04-02 VITALS
OXYGEN SATURATION: 99 % | HEIGHT: 69 IN | SYSTOLIC BLOOD PRESSURE: 120 MMHG | DIASTOLIC BLOOD PRESSURE: 80 MMHG | WEIGHT: 169 LBS | BODY MASS INDEX: 25.03 KG/M2 | HEART RATE: 55 BPM

## 2021-04-02 DIAGNOSIS — L29.0 RECTAL ITCHING: ICD-10-CM

## 2021-04-02 DIAGNOSIS — R20.8 RECTAL BURNING: ICD-10-CM

## 2021-04-02 DIAGNOSIS — R19.4 CHANGE IN BOWEL HABITS: Primary | ICD-10-CM

## 2021-04-02 DIAGNOSIS — Z86.010 HISTORY OF COLON POLYPS: ICD-10-CM

## 2021-04-02 DIAGNOSIS — K62.5 BRBPR (BRIGHT RED BLOOD PER RECTUM): ICD-10-CM

## 2021-04-02 DIAGNOSIS — K62.89 RECTAL PAIN: ICD-10-CM

## 2021-04-02 DIAGNOSIS — K59.00 CONSTIPATION, UNSPECIFIED CONSTIPATION TYPE: ICD-10-CM

## 2021-04-02 PROCEDURE — 1123F ACP DISCUSS/DSCN MKR DOCD: CPT | Performed by: NURSE PRACTITIONER

## 2021-04-02 PROCEDURE — 99214 OFFICE O/P EST MOD 30 MIN: CPT | Performed by: NURSE PRACTITIONER

## 2021-04-02 PROCEDURE — G8427 DOCREV CUR MEDS BY ELIG CLIN: HCPCS | Performed by: NURSE PRACTITIONER

## 2021-04-02 PROCEDURE — 4040F PNEUMOC VAC/ADMIN/RCVD: CPT | Performed by: NURSE PRACTITIONER

## 2021-04-02 PROCEDURE — 1036F TOBACCO NON-USER: CPT | Performed by: NURSE PRACTITIONER

## 2021-04-02 PROCEDURE — 3017F COLORECTAL CA SCREEN DOC REV: CPT | Performed by: NURSE PRACTITIONER

## 2021-04-02 PROCEDURE — G8420 CALC BMI NORM PARAMETERS: HCPCS | Performed by: NURSE PRACTITIONER

## 2021-04-02 RX ORDER — AMOXICILLIN 500 MG/1
CAPSULE ORAL DAILY
COMMUNITY
Start: 2021-04-01 | End: 2021-11-01 | Stop reason: CLARIF

## 2021-04-02 RX ORDER — CLINDAMYCIN HYDROCHLORIDE 150 MG/1
CAPSULE ORAL
COMMUNITY
Start: 2021-04-01 | End: 2021-06-11 | Stop reason: ALTCHOICE

## 2021-04-02 ASSESSMENT — ENCOUNTER SYMPTOMS
VOICE CHANGE: 0
DIARRHEA: 0
NAUSEA: 0
TROUBLE SWALLOWING: 0
CONSTIPATION: 1
ABDOMINAL PAIN: 0
ABDOMINAL DISTENTION: 0
BACK PAIN: 0
SORE THROAT: 0
RECTAL PAIN: 1
COUGH: 0
BLOOD IN STOOL: 0
ANAL BLEEDING: 1
SHORTNESS OF BREATH: 0
VOMITING: 0

## 2021-04-02 NOTE — PATIENT INSTRUCTIONS
You are going to have a colonoscopy and here are some instructions: You will be given specific directions regarding restrictions to diet and bowel prep instructions including laxatives. Please read these instructions one week prior to your scheduled procedure to ensure that you are prepared. Follow prep instructions provided for bowel prep. Take all of the bowel prep as directed. If you are having problems with nausea, stop your prep for 30-45 min to allow the nausea to subside before resuming your prep. Nothing to eat or drink after midnight the day of the procedure EXCEPT:  PLEASE TAKE MEDICATION(S) FOR HIGH BLOOD PRESSURE, THYROID, SEIZURES, AND HEART THE MORNING OF THE PROCEDURE WITH A SIP OF WATER  AT LEAST 2 HOURS PRIOR TO ARRIVAL TIME.   YOU MAY ALSO TAKE ANY INHALERS YOU ARE PRESCRIBED. You will not be able to drive for 24 hours after the procedure due to sedation. Bring a  with you the day of the procedure. No aspirin, ibuprofen, naproxen, fish oil or vitamin E for 5 days before procedure. If you are on blood thinners, clearance from the prescribing physician will be obtained before your procedure is scheduled. Increased [email or web address] may be associated with discontinuation of your blood thinner and include, but not limited to, stroke, TIA, or cardiac event. If polyps are removed during the procedure they will be sent to a pathologist for analysis. You will be notified by mail of the pathology results in 2-3 weeks. Your physician may also schedule a follow up appointment with the nurse practitioner to discuss pathology, symptoms or to check if you have had any problems related to your procedure. If you prefer not to return to the office after your procedure please discuss this with your physician on the day of your colonoscopy. Final recommendations are based on the pathologist report.      Your diet before a colonoscopy bowel preparation is very important to ensure a successful colon exam. It is recommended to consider certain changes to your diet three to four days prior to the procedure. Remember that your bowels need to be empty for the exam.    What foods are good to eat? Cut down on heavy solid foods three to four days before the procedure and start introducing lighter meals to your diet. The following food suggestions are a good part of your diet before a colonoscopy bowel preparation. Light meat that is easily digestible such as chicken (without the skin)   Potatoes without skin   Cheese   Eggs   A light meal of steamed white fish   Light clear soups    Foods and drinks to avoid  Avoid foods that contain too much fiber. Stay clear of dark colored beverages. They can stick to the walls of the digestive tract and make it difficult to differentiate from blood. Some of these foods are:  Red meat, rice, nuts and vegetables   Milk, other milk based fluids and cream   Most fruit and puddings   Whole grain pasta   Cereals, bran and seeds   Colored beverages, especially those that are red or purple in color   Red colored Jell-O   On the day before the colonoscopy, continue to drink plenty of clear fluids. It is important   to keep yourself hydrated before the exam.     Please follow all instructions as provided for cleansing the bowel. Failure to have an adequately prepped colon may cause you to have incomplete exam with further testing required.

## 2021-04-02 NOTE — PROGRESS NOTES
Subjective:      Yanelis Dee is a77 y.o. male  Chief Complaint   Patient presents with    Constipation    Rectal Bleeding       HPI  PCP: Adrian Ochoa MD  Referring Provider: Carli Sandoval MD  Pt is referred here by Dr Cadence Sarmiento for [address]. New pt to me. Previous pt of JOHNSON Camargo. Pt reports he has rectal itching and rectal bleeding. And rectal pain and burning with BMs. Started about 2 years ago. Getting worse over the past few months. C/o constipation. Started 6 months ago. Previous to this had good soft BMs daily. No med changes and no diet changes when this all started. Has 1-3 BMs daily, stools are hard most of the time and he feels he doesn't empty completely. Has tried miralax prn, not daily. GI scope reports in history per MA per OV policy, I reviewed this. Family HX:    Pt denies family hx of colon polyps, colon CA, inflammatory bowel dx, gastric CA and esophageal CA.     Past Medical History:   Diagnosis Date    Bipolar disorder ([facility] [address]     Colon polyps     Hypothyroidism     Insomnia           Past Surgical History:   Procedure Laterality Date    CARPAL TUNNEL RELEASE Right     approx 5 years ago    CLAVICLE SURGERY      x 2    COLONOSCOPY  ? 5-6 years ago        COLONOSCOPY N/A 12/19/2014    Monica:  normal,   5y recall    FOOT SURGERY      x 2    HERNIA REPAIR      JOINT REPLACEMENT Right     hip    THYROIDECTOMY      VARICOSE VEIN SURGERY      VASECTOMY         Social History     Socioeconomic History    Marital status: Single     Spouse name: None    Number of children: None    Years of education: None    Highest education level: None   Occupational History    None   Social Needs    Financial resource strain: None    Food insecurity     Worry: None     Inability: None    Transportation needs     Medical: None     Non-medical: None   Tobacco Use    Smoking status: Never Smoker    Smokeless tobacco: Never Used   Substance and Sexual Activity    Alcohol use: Yes     Alcohol/week: 3.0 standard drinks     Types: 3 Glasses of wine per week    Drug use: No    Sexual activity: None   Lifestyle    Physical activity     Days per week: None     Minutes per session: None    Stress: None   Relationships    Social connections     Talks on phone: None     Gets together: None     Attends [employer] service: None     Active member of club or organization: None     Attends meetings of clubs or organizations: None     Relationship status: None    Intimate partner violence     Fear of current or ex partner: None     Emotionally abused: None     Physically abused: None     Forced sexual activity: None   Other Topics Concern    None   Social History Narrative    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)    Here with his significant other    Previously  and     He has 1 son and 1 daughter    Education masters degree in fine arts    Former distance runner also bicyclist not so much these days    Smoked minimally 3 to 4 years total many years ago denies alcohol consumption or substance usage       Allergies   Allergen Reactions    Morphine Itching    Coreg [Carvedilol] Nausea And Vomiting       Current Outpatient Medications   Medication Sig Dispense Refill    amoxicillin (AMOXIL) 500 MG capsule       clindamycin (CLEOCIN) 150 MG capsule       Vitamins/Minerals TABS Take 1 tablet by mouth daily      tamsulosin (FLOMAX) 0.4 MG capsule Take 1 capsule by mouth nightly 90 capsule 3    lisinopril (PRINIVIL;ZESTRIL) 5 MG tablet Take 5 mg by mouth daily      ELIQUIS 5 MG TABS tablet TAKE 1 TABLET BY MOUTH TWICE A DAY 60 tablet 5    QUEtiapine (SEROQUEL) 50 MG tablet Take 50 mg by mouth nightly.  levothyroxine (SYNTHROID) 137 MCG tablet Take 137 mcg by mouth Daily        No current facility-administered medications for this visit. Review of Systems   Constitutional: Positive for fatigue.  Negative for appetite change, fever and unexpected weight change. HENT: Negative for sore throat, trouble swallowing and voice change. Respiratory: Negative for cough and shortness of breath. Cardiovascular: Negative for chest pain, palpitations and leg swelling. Gastrointestinal: Positive for anal bleeding, constipation and rectal pain. Negative for abdominal distention, abdominal pain, blood in stool, diarrhea, nausea and vomiting. Genitourinary: Negative for hematuria. Musculoskeletal: Positive for arthralgias. Negative for back pain and neck pain. Neurological: Negative for dizziness, weakness, light-headedness and headaches. Psychiatric/Behavioral: Negative for dysphoric mood and sleep disturbance. The patient is not nervous/anxious. All other systems reviewed and are negative. Objective:     Physical Exam  Vitals signs and nursing note reviewed. Constitutional:       Appearance: He is well-developed. Comments: /80   Pulse 55   Ht 5' 9\" (1.753 m)   Wt 169 lb (76.7 kg)   SpO2 99%   BMI 24.96 kg/m²    Eyes:      General: No scleral icterus. Conjunctiva/sclera: Conjunctivae normal.      Pupils: Pupils are equal, round, and reactive to light. Neck:      Musculoskeletal: Normal range of motion and neck supple. Thyroid: No thyromegaly. Cardiovascular:      Rate and Rhythm: Normal rate and regular rhythm. Heart sounds: Normal heart sounds. No murmur. No friction rub. No gallop. Pulmonary:      Effort: Pulmonary effort is normal. No respiratory distress. Breath sounds: Normal breath sounds. Abdominal:      General: Bowel sounds are normal. There is no distension. Palpations: Abdomen is soft. Tenderness: There is no abdominal tenderness. There is no rebound. Musculoskeletal: Normal range of motion. General: No deformity. Skin:     Coloration: Skin is not pale.    Neurological:      Mental Status: He is alert and oriented to person, place, and time. Cranial Nerves: No cranial nerve deficit. Psychiatric:         Judgment: Judgment normal.           Assessment:       Diagnosis Orders   1. Change in bowel habits  COLONOSCOPY W/ OR W/O BIOPSY   2. Constipation, unspecified constipation type  COLONOSCOPY W/ OR W/O BIOPSY   3. BRBPR (bright red blood per rectum)  COLONOSCOPY W/ OR W/O BIOPSY   4. Rectal pain  COLONOSCOPY W/ OR W/O BIOPSY   5. Rectal burning  COLONOSCOPY W/ OR W/O BIOPSY   6. Rectal itching  COLONOSCOPY W/ OR W/O BIOPSY   7. History of colon polyps  COLONOSCOPY W/ OR W/O BIOPSY         Plan:      1. Schedule outpatient colonoscopy. Patient advised no Aspirin, Fish Oil, Vit E or NSAIDs 5 (five) days before procedure. Follow-up Visit: per Dr Beatrice Cartagena clearance from PCP  to stop  eliquis  Clearance for discontinuing anticoagulants is needed before scheduling procedure. Increased r isks may be associated with discontinuation of this medication including stroke, TIA, and cardiac event. I have discussed this with patient. Patient voices understanding and agrees to proceed with scheduling. Pt education:  Risks, benefits, and alternatives to colonoscopy were discussed. Risks of colonoscopy include, but are not limited to, perforation, bleeding, and infection. We discussed that the risk for perforation is 1-3 in 5,000  at the time of colonoscopy;   and 1-2% risk of bleeding post-polypectomy. All questions answered to the satisfaction of the patient. Pt is agreeable to proceed. 2. Start miralax daily. Samples given to pt  3.  Gave pt samples of Rectiv- hemorrhoidal cream to see if this alleviates some of his rectal itching and burning

## 2021-04-05 ENCOUNTER — TELEPHONE (OUTPATIENT)
Dept: CARDIOLOGY | Facility: CLINIC | Age: 71
End: 2021-04-05

## 2021-04-05 NOTE — TELEPHONE ENCOUNTER
PT WILL BE HAVING DENTAL EXTRACTION ON 4/13/21 - PLEASE ADVISE REGARDING HOLDING ELIQUIS. PT HAS AFIB & WAS LAST SEEN 3/25/21

## 2021-05-03 ENCOUNTER — OFFICE VISIT (OUTPATIENT)
Age: 71
End: 2021-05-03

## 2021-05-03 VITALS — OXYGEN SATURATION: 95 %

## 2021-05-03 DIAGNOSIS — Z11.59 SCREENING FOR VIRAL DISEASE: Primary | ICD-10-CM

## 2021-05-03 LAB — SARS-COV-2, PCR: NOT DETECTED

## 2021-05-03 PROCEDURE — 99999 PR OFFICE/OUTPT VISIT,PROCEDURE ONLY: CPT | Performed by: NURSE PRACTITIONER

## 2021-05-05 ENCOUNTER — HOSPITAL ENCOUNTER (OUTPATIENT)
Age: 71
Setting detail: OUTPATIENT SURGERY
Discharge: HOME | End: 2021-05-05
Attending: INTERNAL MEDICINE | Admitting: INTERNAL MEDICINE
Payer: MEDICARE

## 2021-05-05 ENCOUNTER — ANESTHESIA EVENT (OUTPATIENT)
Dept: OPERATING ROOM | Age: 71
End: 2021-05-05

## 2021-05-05 ENCOUNTER — APPOINTMENT (OUTPATIENT)
Dept: OPERATING ROOM | Age: 71
End: 2021-05-05

## 2021-05-05 ENCOUNTER — ANESTHESIA (OUTPATIENT)
Dept: OPERATING ROOM | Age: 71
End: 2021-05-05

## 2021-05-05 ENCOUNTER — HOSPITAL ENCOUNTER (OUTPATIENT)
Age: 71
Setting detail: SPECIMEN
Discharge: HOME | End: 2021-05-05
Payer: MEDICARE

## 2021-05-05 VITALS
WEIGHT: 161 LBS | DIASTOLIC BLOOD PRESSURE: 73 MMHG | BODY MASS INDEX: 23.85 KG/M2 | HEART RATE: 57 BPM | SYSTOLIC BLOOD PRESSURE: 128 MMHG | OXYGEN SATURATION: 98 % | TEMPERATURE: 97.2 F | HEIGHT: 69 IN | RESPIRATION RATE: 18 BRPM

## 2021-05-05 VITALS — OXYGEN SATURATION: 95 % | SYSTOLIC BLOOD PRESSURE: 113 MMHG | DIASTOLIC BLOOD PRESSURE: 71 MMHG

## 2021-05-05 PROCEDURE — 45385 COLONOSCOPY W/LESION REMOVAL: CPT | Performed by: INTERNAL MEDICINE

## 2021-05-05 PROCEDURE — 88305 TISSUE EXAM BY PATHOLOGIST: CPT

## 2021-05-05 PROCEDURE — 45385 COLONOSCOPY W/LESION REMOVAL: CPT

## 2021-05-05 RX ORDER — SODIUM CHLORIDE 9 MG/ML
INJECTION, SOLUTION INTRAVENOUS CONTINUOUS
Status: DISCONTINUED | OUTPATIENT
Start: 2021-05-05 | End: 2021-05-05 | Stop reason: HOSPADM

## 2021-05-05 RX ORDER — PROPOFOL 10 MG/ML
INJECTION, EMULSION INTRAVENOUS PRN
Status: DISCONTINUED | OUTPATIENT
Start: 2021-05-05 | End: 2021-05-05 | Stop reason: SDUPTHER

## 2021-05-05 RX ORDER — LIDOCAINE HYDROCHLORIDE 10 MG/ML
INJECTION, SOLUTION INFILTRATION; PERINEURAL PRN
Status: DISCONTINUED | OUTPATIENT
Start: 2021-05-05 | End: 2021-05-05 | Stop reason: SDUPTHER

## 2021-05-05 RX ADMIN — SODIUM CHLORIDE: 9 INJECTION, SOLUTION INTRAVENOUS at 09:58

## 2021-05-05 RX ADMIN — LIDOCAINE HYDROCHLORIDE 40 MG: 10 INJECTION, SOLUTION INFILTRATION; PERINEURAL at 10:08

## 2021-05-05 RX ADMIN — PROPOFOL 340 MG: 10 INJECTION, EMULSION INTRAVENOUS at 10:08

## 2021-05-05 NOTE — OP NOTE
Patient: Deniz Menard : 1950  Med Rec#: [id number] Acc#: [id number]   Primary Care Provider Davis Quan MD    Date of Procedure:  2021    Endoscopist: Vijaya Taylor MD    Referring Provider: Davis Quan MD,     Operation Performed: Colonoscopy to cecum with hot snare resection of multiple right colon polyps    Indications:   1. Change in bowel habits     2. Constipation, unspecified constipation type     3. BRBPR (bright red blood per rectum)     4. Rectal pain     5. Rectal burning     6. Rectal itching     7. History of colon polyps    8. Strong family history of GI cancers including:, Gastric and esophageal  Anesthesia:  Sedation was administered by anesthesia who monitored the patient during the procedure. I met with Deniz Menard prior to procedure. We discussed the procedure itself, and I have discussed the risks of endoscopy (including-- but not limited to-- pain, discomfort, bleeding potentially requiring second endoscopic procedure and/or blood transfusion, organ perforation requiring operative repair, damage to organs near the colon, infection, aspiration, cardiopulmonary/allergic reaction), benefits, indications to endoscopy. Additionally, we discussed options other than colonoscopy. The patient expressed understanding. All questions answered. The patient decided to proceed with the procedure. Signed informed consent was placed on the chart. Blood Loss: minimal    Withdrawal time: More than 20 minutes  Bowel Prep: adequate     Complications: no immediate complications    DESCRIPTION OF PROCEDURE:     A time out was performed. After written informed consent was obtained, the patient was placed in the left lateral position. The perianal area was inspected, and a digital rectal exam was performed. A rectal exam was performed: normal tone, no palpable lesions.  At this point, a forward viewing Olympus colonoscope was inserted into the anus and carefully advanced to the cecum with some difficulty requiring external abdominal pressure during parts of this procedure. The cecum was identified by the ileocecal valve and the appendiceal orifice. The colonoscope was then slowly withdrawn with careful inspection of the mucosa in a linear and circumferential fashion. The scope was retroflexed in the rectum. Suction was utilized during the procedure to remove as much air as possible from the bowel. The colonoscope was removed from the patient, and the procedure was terminated. Findings are listed below. Findings:   3 flat sessile polyps were found in the right colon as follows and were resected by hot snare polypectomy technique:  A 1.5 cm in diameter flat sessile polyp was removed in a piecemeal fashion from the proximal ascending colon. A second 1 cm in diameter flat polyp in the same area was removed separately by hot snare technique. A third 8 mm sessile flat polyp at the hepatic flexure likewise was subjected to hot snare polypectomy. Otherwise, the mucosa appeared normal throughout the entire examined colon     NO larger polyps or masses or strictures or colitis or proctitis. Moderate diverticulosis in the left colon      Where it was clearly visible, the mucosa appeared normal throughout the entire examined colon  Retroflexion in the rectum was otherwise normal and revealed no further abnormalities     No clear-cut lesions were noted in the rectum to explain his proctalgia or perianal itching. Recommendations:  1. Repeat colonoscopy: pending pathology -due to the polyps noted today as well as his strong family history, for surveillance in 1 year. 2. Await biopsy results-you will receive a letter with your results within 7-10 days    - Resume previous meds and diet  - GI clinic f/u 4-6 weeks with Ms. De La Garza  - Keep scheduled f/u appts with other MDs     - NO ASA/NSAIDs x 2 weeks    Findings and recommendations were discussed w/ the patient.  A copy of the images was provided.     Minerva Lennox, MD  5/5/2021  9:10 AM

## 2021-05-05 NOTE — ANESTHESIA POSTPROCEDURE EVALUATION
Department of Anesthesiology  Postprocedure Note    Patient: Daniel Anne  MRN: [id number]  YOB: 1950  Date of evaluation: 5/5/2021  Time:  1043    Procedure Summary     Date: 05/05/21 Room / Location: [facility] ENDO 02 / [address] [place] [address]    Anesthesia Start: 8301 Anesthesia Stop:     Procedure: COLONOSCOPY POLYPECTOMY SNARE/COLD BIOPSY (N/A Abdomen) Diagnosis: (Squire Aye)    Surgeons: Ray Conrad MD Responsible Provider: JOHNSON Snow CRNA    Anesthesia Type: general, TIVA ASA Status: 3          Anesthesia Type: No value filed. Chad Phase I:      Chad Phase II: Chad Score: 10    Last vitals: Reviewed and per EMR flowsheets.        Anesthesia Post Evaluation    Patient location during evaluation: bedside  Patient participation: complete - patient participated  Level of consciousness: sleepy but conscious  Pain score: 0  Airway patency: patent  Nausea & Vomiting: no nausea and no vomiting  Complications: no  Cardiovascular status: hemodynamically stable and blood pressure returned to baseline  Respiratory status: acceptable and nasal cannula  Hydration status: stable

## 2021-05-05 NOTE — H&P
Patient Name: Missy Hall  : 1950  MRN: [id number]  DATE: 21    Allergies: Allergies   Allergen Reactions    Morphine Itching    Coreg [Carvedilol] Nausea And Vomiting        ENDOSCOPY  History and Physical    Procedure:    [x] Diagnostic Colonoscopy       [] Screening Colonoscopy  [] EGD      [] ERCP      [] EUS       [] Other    [x] Previous office notes/History and Physical reviewed from the patients chart. Please see EMR for further details of HPI. I have examined the patient's status immediately prior to the procedure and:      Indications/HPI:    1. Change in bowel habits     2. Constipation, unspecified constipation type     3. BRBPR (bright red blood per rectum)     4. Rectal pain     5. Rectal burning     6. Rectal itching     7. History of colon polyps        []Abdominal Pain   []Cancer- GI/Lung     []Fhx of colon CA/polyps  []History of Polyps  []Barretts            []Melena  []Abnormal Imaging              []Dysphagia              []Persistent Pneumonia   []Anemia                            []Food Impaction        []History of Polyps  [] GI Bleed             []Pulmonary nodule/Mass   []Change in bowel habits []Heartburn/Reflux  []Rectal Bleed (BRBPR)  []Chest Pain - Non Cardiac []Heme (+) Stool []Ulcers  []Constipation  []Hemoptysis  []Varices  []Diarrhea  []Hypoxemia    []Nausea/Vomiting   []Screening   []Crohns/Colitis  []Other:     Anesthesia:   [x] MAC [] Moderate Sedation   [] General   [] None     ROS: 12 pt Review of Symptoms was negative unless mentioned above    Medications:   Prior to Admission medications    Medication Sig Start Date End Date Taking?  Authorizing Provider   levothyroxine (SYNTHROID) 137 MCG tablet Take 137 mcg by mouth Daily    Yes Historical Provider, MD   amoxicillin (AMOXIL) 500 MG capsule  21   Historical Provider, MD   clindamycin (CLEOCIN) 150 MG capsule  21   Historical Provider, MD   Vitamins/Minerals TABS Take 1 tablet by mouth daily Historical Provider, MD   tamsulosin (FLOMAX) 0.4 MG capsule Take 1 capsule by mouth nightly 3/29/21   Gabriel Arora MD   lisinopril (PRINIVIL;ZESTRIL) 5 MG tablet Take 5 mg by mouth daily    Historical Provider, MD   ELIQUDAVID 5 MG TABS tablet TAKE 1 TABLET BY MOUTH TWICE A DAY 8/31/20   JOHNSON Nath   QUEtiapine (SEROQUEL) 50 MG tablet Take 50 mg by mouth nightly. Historical Provider, MD       Past Medical History:  Past Medical History:   Diagnosis Date    Bipolar disorder ([address]     Colon polyps     Hypothyroidism     Insomnia        Past Surgical History:  Past Surgical History:   Procedure Laterality Date    CARPAL TUNNEL RELEASE Right     approx 5 years ago    CLAVICLE SURGERY      x 2    COLONOSCOPY  ? 5-6 years ago        COLONOSCOPY N/A 12/19/2014    Monica:  normal,   5y recall    FOOT SURGERY      x 2    HERNIA REPAIR      JOINT REPLACEMENT Right     hip    THYROIDECTOMY      VARICOSE VEIN SURGERY      VASECTOMY         Social History:  Social History     Tobacco Use    Smoking status: Never Smoker    Smokeless tobacco: Never Used   Substance Use Topics    Alcohol use: Yes     Alcohol/week: 3.0 standard drinks     Types: 3 Glasses of wine per week     Comment: weekly    Drug use: No       Vital Signs: There were no vitals filed for this visit. Physical Exam:  Cardiac:  [x]WNL  []Comments:  Pulmonary:  [x]WNL   []Comments:  Neuro/Mental Status:  [x]WNL  []Comments:  Abdominal:  [x]WNL    []Comments:  Other:   []WNL  []Comments:    Informed Consent:  The risks and benefits of the procedure have been discussed with either the patient or if they cannot consent, their representative. Assessment:  Patient examined and appropriate for planned sedation and procedure. Plan:  Proceed with planned sedation and procedure as above.          Rosalind Joy MD

## 2021-05-05 NOTE — ANESTHESIA PRE PROCEDURE
Department of Anesthesiology  Preprocedure Note       Name:  Rhett Wilson   Age:  69 y.o.  :  1950                                          MRN:  [id number]         Date:  2021      Surgeon: Edna Serra):  Kristin Anderson MD    Procedure: Procedure(s):  COLONOSCOPY DIAGNOSTIC    Medications prior to admission:   Prior to Admission medications    Medication Sig Start Date End Date Taking? Authorizing Provider   amoxicillin (AMOXIL) 500 MG capsule  21   Historical Provider, MD   clindamycin (CLEOCIN) 150 MG capsule  21   Historical Provider, MD   Vitamins/Minerals TABS Take 1 tablet by mouth daily    Historical Provider, MD   tamsulosin (FLOMAX) 0.4 MG capsule Take 1 capsule by mouth nightly 3/29/21   Rosa Menon MD   lisinopril (PRINIVIL;ZESTRIL) 5 MG tablet Take 5 mg by mouth daily    Historical Provider, MD   ELIQUIS 5 MG TABS tablet TAKE 1 TABLET BY MOUTH TWICE A DAY 20   JOHNSON Goetz   QUEtiapine (SEROQUEL) 50 MG tablet Take 50 mg by mouth nightly. Historical Provider, MD   levothyroxine (SYNTHROID) 137 MCG tablet Take 137 mcg by mouth Daily     Historical Provider, MD       Current medications:    Current Facility-Administered Medications   Medication Dose Route Frequency Provider Last Rate Last Admin    0.9 % sodium chloride infusion   Intravenous Continuous Kristin Anderson MD           Allergies:     Allergies   Allergen Reactions    Morphine Itching    Coreg [Carvedilol] Nausea And Vomiting       Problem List:    Patient Active Problem List   Diagnosis Code    History of colon polyps Z86.010    Hypothyroidism E03.9    Orthostasis I95.1    Bradycardia R00.1    Paroxysmal atrial fibrillation (HCC) I48.0    Change in bowel habits R19.4    Constipation K59.00    BRBPR (bright red blood per rectum) K62.5    Rectal pain K62.89    Rectal burning R20.8    Rectal itching L29.0       Past Medical History:        Diagnosis Date    Bipolar disorder ([facility] [address]     Colon polyps     Hypothyroidism     Insomnia        Past Surgical History:        Procedure Laterality Date    CARPAL TUNNEL RELEASE Right     approx 5 years ago    CLAVICLE SURGERY      x 2    COLONOSCOPY  ? 5-6 years ago        COLONOSCOPY N/A 12/19/2014    Monica:  normal,   5y recall    FOOT SURGERY      x 2    HERNIA REPAIR      JOINT REPLACEMENT Right     hip    THYROIDECTOMY      VARICOSE VEIN SURGERY      VASECTOMY         Social History:    Social History     Tobacco Use    Smoking status: Never Smoker    Smokeless tobacco: Never Used   Substance Use Topics    Alcohol use: Yes     Alcohol/week: 3.0 standard drinks     Types: 3 Glasses of wine per week                                Counseling given: Not Answered      Vital Signs (Current): There were no vitals filed for this visit.                                            BP Readings from Last 3 Encounters:   04/02/21 120/80   02/09/21 133/80   06/06/20 122/72       NPO Status:                                                                                 BMI:   Wt Readings from Last 3 Encounters:   04/02/21 169 lb (76.7 kg)   03/29/21 174 lb (78.9 kg)   02/09/21 165 lb (74.8 kg)     There is no height or weight on file to calculate BMI.    CBC:   Lab Results   Component Value Date    WBC 3.8 06/06/2020    RBC 4.08 06/06/2020    HGB 13.5 06/06/2020    HGB 16.0 06/03/2011    HGB 15.0 01/14/2011    HCT 38.4 06/06/2020    HCT 43.6 01/14/2011    MCV 94.1 06/06/2020    RDW 12.8 06/06/2020     06/06/2020     01/14/2011       CMP:   Lab Results   Component Value Date     06/06/2020     06/03/2011    K 4.4 06/06/2020    K 4.1 06/03/2011     06/06/2020     06/03/2011    CO2 26 06/06/2020    BUN 11 06/06/2020    CREATININE 0.6 06/06/2020    CREATININE 0.9 06/03/2011    LABGLOM >60 06/06/2020    GLUCOSE 105 06/06/2020    PROT 6.3 06/06/2020    PROT 7.0 06/03/2011    CALCIUM 8.1 06/06/2020 BILITOT 0.9 06/06/2020    ALKPHOS 52 06/06/2020    ALKPHOS 54 06/03/2011    AST 14 06/06/2020    ALT 11 06/06/2020       POC Tests: No results for input(s): POCGLU, POCNA, POCK, POCCL, POCBUN, POCHEMO, POCHCT in the last 72 hours. Coags:   Lab Results   Component Value Date    PROTIME 14.9 06/06/2020    INR 1.17 06/06/2020    APTT 31.0 06/06/2020       HCG (If Applicable): No results found for: PREGTESTUR, PREGSERUM, HCG, HCGQUANT     ABGs: No results found for: PHART, PO2ART, BIG5PZG, IHV1TMU, BEART, L5KNYKHN     Type & Screen (If Applicable):  No results found for: LABABO, LABRH    Drug/Infectious Status (If Applicable):  No results found for: HIV, HEPCAB    COVID-19 Screening (If Applicable):   Lab Results   Component Value Date    COVID19 Not Detected 05/03/2021           Anesthesia Evaluation  Patient summary reviewed no history of anesthetic complications:   Airway: Mallampati: II  TM distance: >3 FB   Neck ROM: full  Mouth opening: < 3 FB Dental: normal exam         Pulmonary:normal exam    (+) asthma:                            Cardiovascular:    (+) hypertension:, dysrhythmias: atrial fibrillation,          Beta Blocker:  Not on Beta Blocker         Neuro/Psych:   (+) psychiatric history (Bipolar):            GI/Hepatic/Renal:   (+) bowel prep,          ROS comment: etoh use. Endo/Other:    (+) hypothyroidism, blood dyscrasia (Eliquis last dose 5 days ago): anticoagulation therapy:., .                 Abdominal:           Vascular: negative vascular ROS. Anesthesia Plan      general and TIVA     ASA 3       Induction: intravenous. Anesthetic plan and risks discussed with patient.                       Luisito Mancusoal, APRN - CRNA   5/5/2021

## 2021-06-11 ENCOUNTER — OFFICE VISIT (OUTPATIENT)
Dept: GASTROENTEROLOGY | Age: 71
End: 2021-06-11
Payer: MEDICARE

## 2021-06-11 VITALS
HEIGHT: 69 IN | BODY MASS INDEX: 24.97 KG/M2 | SYSTOLIC BLOOD PRESSURE: 102 MMHG | DIASTOLIC BLOOD PRESSURE: 64 MMHG | OXYGEN SATURATION: 98 % | WEIGHT: 168.6 LBS | HEART RATE: 62 BPM

## 2021-06-11 DIAGNOSIS — Z98.890 S/P COLONOSCOPIC POLYPECTOMY: ICD-10-CM

## 2021-06-11 DIAGNOSIS — K59.00 CONSTIPATION, UNSPECIFIED CONSTIPATION TYPE: Primary | ICD-10-CM

## 2021-06-11 PROCEDURE — 1036F TOBACCO NON-USER: CPT | Performed by: NURSE PRACTITIONER

## 2021-06-11 PROCEDURE — 4040F PNEUMOC VAC/ADMIN/RCVD: CPT | Performed by: NURSE PRACTITIONER

## 2021-06-11 PROCEDURE — 99213 OFFICE O/P EST LOW 20 MIN: CPT | Performed by: NURSE PRACTITIONER

## 2021-06-11 PROCEDURE — G8420 CALC BMI NORM PARAMETERS: HCPCS | Performed by: NURSE PRACTITIONER

## 2021-06-11 PROCEDURE — 3017F COLORECTAL CA SCREEN DOC REV: CPT | Performed by: NURSE PRACTITIONER

## 2021-06-11 PROCEDURE — 1123F ACP DISCUSS/DSCN MKR DOCD: CPT | Performed by: NURSE PRACTITIONER

## 2021-06-11 PROCEDURE — G8427 DOCREV CUR MEDS BY ELIG CLIN: HCPCS | Performed by: NURSE PRACTITIONER

## 2021-06-11 RX ORDER — LUBIPROSTONE 8 UG/1
8 CAPSULE, GELATIN COATED ORAL 2 TIMES DAILY WITH MEALS
Qty: 60 CAPSULE | Refills: 11 | Status: SHIPPED | OUTPATIENT
Start: 2021-06-11 | End: 2021-11-01 | Stop reason: CLARIF

## 2021-06-11 ASSESSMENT — ENCOUNTER SYMPTOMS
CONSTIPATION: 1
DIARRHEA: 0
TROUBLE SWALLOWING: 0
BLOOD IN STOOL: 0
ABDOMINAL DISTENTION: 0
ANAL BLEEDING: 0
VOICE CHANGE: 0
SHORTNESS OF BREATH: 0
BACK PAIN: 0
NAUSEA: 0
ABDOMINAL PAIN: 0
COUGH: 0
RECTAL PAIN: 0
VOMITING: 0

## 2021-06-11 NOTE — PATIENT INSTRUCTIONS
Patient Education        Constipation: Care Instructions  Your Care Instructions     Constipation means that you have a hard time passing stools (bowel movements). People pass stools from 3 times a day to once every 3 days. What is normal for you may be different. Constipation may occur with pain in the rectum and cramping. The pain may get worse when you try to pass stools. Sometimes there are small amounts of bright red blood on toilet paper or the surface of stools. This is because of enlarged veins near the rectum (hemorrhoids). A few changes in your diet and lifestyle may help you avoid ongoing constipation. Your doctor may also prescribe medicine to help loosen your stool. Some medicines can cause constipation. These include pain medicines and antidepressants. Tell your doctor about all the medicines you take. Your doctor may want to make a medicine change to ease your symptoms. Follow-up care is a key part of your treatment and safety. Be sure to make and go to all appointments, and call your doctor if you are having problems. It's also a good idea to know your test results and keep a list of the medicines you take. How can you care for yourself at home? · Drink plenty of fluids. If you have kidney, heart, or liver disease and have to limit fluids, talk with your doctor before you increase the amount of fluids you drink. · Include high-fiber foods in your diet each day. These include fruits, vegetables, beans, and whole grains. · Get at least 30 minutes of exercise on most days of the week. Walking is a good choice. You also may want to do other activities, such as running, swimming, cycling, or playing tennis or team sports. · Take a fiber supplement, such as Citrucel or Metamucil, every day. Read and follow all instructions on the label. · Schedule time each day for a bowel movement. A daily routine may help. Take your time having your bowel movement.   · Support your feet with a small step stool when you sit on the toilet. This helps flex your hips and places your pelvis in a squatting position. · Your doctor may recommend an over-the-counter laxative to relieve your constipation. Examples are Milk of Magnesia and MiraLax. Read and follow all instructions on the label. Do not use laxatives on a long-term basis. When should you call for help? Call your doctor now or seek immediate medical care if:    · You have new or worse belly pain.     · You have new or worse nausea or vomiting.     · You have blood in your stools. Watch closely for changes in your health, and be sure to contact your doctor if:    · Your constipation is getting worse.     · You do not get better as expected. Where can you learn more? Go to [email or web address]. org and sign in to your [facility] account. Enter 21 [phone] in the [employer] box to learn more about \"Constipation: Care Instructions. \"     If you do not have an account, please click on the \"Sign Up Now\" link. Current as of: February 26, 2020               Content Version: 12.8  © 3357-3492 [employer] Care instructions adapted under license by [employer]. If you have questions about a medical condition or this instruction, always ask your healthcare professional. [address]  any warranty or liability for your use of this information.

## 2021-06-11 NOTE — PROGRESS NOTES
Subjective:      Dimmayra Nevcarmel is a77 y.o. male  Chief Complaint   Patient presents with    Follow Up After Procedure    Constipation       HPI  PCP: Pepe Francisco MD  Referring Provider: Dr Oralia Zacarias MD  Pt made an appt s/p colonoscopy, to discuss results. Results noted in history per MA, I reviewed this and we discussed everything. Denies post-procedural complications. Still has constipation and hard stools. Even with taking miralax daily. And feels he isnt emptying. Has 1BM a day, however stools are hard. Family HX:    Pt denies family hx of colon polyps, colon CA, inflammatory bowel dx, gastric CA and esophageal CA. Past Medical History:   Diagnosis Date    Bipolar disorder ([address]     Colon polyps     Hypothyroidism     Insomnia           Past Surgical History:   Procedure Laterality Date    CARPAL TUNNEL RELEASE Right     approx 5 years ago    CLAVICLE SURGERY      x 2    COLONOSCOPY  ? 5-6 years ago        COLONOSCOPY N/A 12/19/2014    Monica:  normal,   5y recall    COLONOSCOPY N/A 05/05/2021    Dr Oralia Zacarias, Benign Serrated Polyps (SSA -dysplasia, HP), Moderate diverticulosis, No clear-cut lesions to explain proctalgia/perianal itching, 1 year recall    FOOT SURGERY      x 2    HERNIA REPAIR      JOINT REPLACEMENT Right     hip    THYROIDECTOMY      VARICOSE VEIN SURGERY      VASECTOMY         Social History     Socioeconomic History    Marital status: Single     Spouse name: None    Number of children: None    Years of education: None    Highest education level: None   Occupational History    None   Tobacco Use    Smoking status: Never Smoker    Smokeless tobacco: Never Used   Vaping Use    Vaping Use: Never used   Substance and Sexual Activity    Alcohol use:  Yes     Alcohol/week: 3.0 standard drinks     Types: 3 Glasses of wine per week     Comment: weekly    Drug use: No    Sexual activity: None   Other Topics Concern    None   Social History Narrative    Retired artist former professor [employer] ([id number])    Here with his significant other    Previously  and     He has 1 son and 1 daughter    Education masters degree in fine arts    Former distance runner also bicyclist not so much these days    Smoked minimally 3 to 4 years total many years ago denies alcohol consumption or substance usage     Social Determinants of Health     Financial Resource Strain:     Difficulty of Paying Living Expenses:    Food Insecurity:     Worried About Running Out of Food in the Last Year:     [address] in the Last Year:    Transportation Needs:     Lack of Transportation (Medical):  Lack of Transportation (Non-Medical):    Physical Activity:     Days of Exercise per Week:     Minutes of Exercise per Session:    Stress:     Feeling of Stress :    Social Connections:     Frequency of Communication with Friends and Family:     Frequency of Social Gatherings with Friends and Family:     Attends Spiritism Services:     Active Member of Clubs or Organizations:     Attends Club or Organization Meetings:     Marital Status:    Intimate Partner Violence:     Fear of Current or Ex-Partner:     Emotionally Abused:     Physically Abused:     Sexually Abused:         Allergies   Allergen Reactions    Morphine Itching    Coreg [Carvedilol] Nausea And Vomiting       Current Outpatient Medications   Medication Sig Dispense Refill    lubiprostone (AMITIZA) 8 MCG CAPS capsule Take 1 capsule by mouth 2 times daily (with meals) 60 capsule 11    amoxicillin (AMOXIL) 500 MG capsule daily       Vitamins/Minerals TABS Take 1 tablet by mouth daily      tamsulosin (FLOMAX) 0.4 MG capsule Take 1 capsule by mouth nightly 90 capsule 3    lisinopril (PRINIVIL;ZESTRIL) 5 MG tablet Take 5 mg by mouth daily      ELIQUIS 5 MG TABS tablet TAKE 1 TABLET BY MOUTH TWICE A DAY 60 tablet 5    QUEtiapine (SEROQUEL) 50 MG tablet Take 50 mg by mouth unspecified constipation type  lubiprostone (AMITIZA) 8 MCG CAPS capsule   2. S/P colonoscopic polypectomy           Plan:      1. Stop the miralax. Start amitiza 8mcg BID. This was escribed.   2. F/u in 4 weeks if not effective

## 2021-06-15 RX ORDER — LISINOPRIL 5 MG/1
TABLET ORAL
Qty: 90 TABLET | Refills: 3 | Status: SHIPPED | OUTPATIENT
Start: 2021-06-15 | End: 2022-04-15

## 2021-08-31 PROCEDURE — U0004 COV-19 TEST NON-CDC HGH THRU: HCPCS | Performed by: NURSE PRACTITIONER

## 2021-09-28 ENCOUNTER — OFFICE VISIT (OUTPATIENT)
Dept: CARDIOLOGY | Facility: CLINIC | Age: 71
End: 2021-09-28

## 2021-09-28 VITALS
HEIGHT: 69 IN | DIASTOLIC BLOOD PRESSURE: 70 MMHG | BODY MASS INDEX: 24.59 KG/M2 | SYSTOLIC BLOOD PRESSURE: 128 MMHG | HEART RATE: 51 BPM | WEIGHT: 166 LBS

## 2021-09-28 DIAGNOSIS — Z79.01 CURRENT USE OF LONG TERM ANTICOAGULATION: ICD-10-CM

## 2021-09-28 DIAGNOSIS — I42.8 NON-ISCHEMIC CARDIOMYOPATHY (HCC): Chronic | ICD-10-CM

## 2021-09-28 DIAGNOSIS — I47.1 PAROXYSMAL SVT (SUPRAVENTRICULAR TACHYCARDIA) (HCC): ICD-10-CM

## 2021-09-28 DIAGNOSIS — I47.29 NSVT (NONSUSTAINED VENTRICULAR TACHYCARDIA) (HCC): ICD-10-CM

## 2021-09-28 DIAGNOSIS — I48.0 PAF (PAROXYSMAL ATRIAL FIBRILLATION) (HCC): Primary | Chronic | ICD-10-CM

## 2021-09-28 PROCEDURE — 93000 ELECTROCARDIOGRAM COMPLETE: CPT | Performed by: INTERNAL MEDICINE

## 2021-09-28 PROCEDURE — 99214 OFFICE O/P EST MOD 30 MIN: CPT | Performed by: INTERNAL MEDICINE

## 2021-09-28 RX ORDER — LEVOTHYROXINE SODIUM 137 UG/1
137 TABLET ORAL DAILY
COMMUNITY
Start: 2021-08-16

## 2021-09-28 RX ORDER — LYSINE 500 MG
TABLET ORAL
COMMUNITY
End: 2022-10-23

## 2021-09-28 RX ORDER — GABAPENTIN 300 MG/1
300 CAPSULE ORAL ONCE
COMMUNITY
Start: 2021-09-07 | End: 2022-10-05

## 2021-09-28 NOTE — PROGRESS NOTES
David Schulz  [id number]  1950  71 y.o.  male        Referring Provider: Ingrid Vergara MD    Reason for  Visit:    Here for routine follow up   Initial visit for virtual visit using above modality   Echo in [place] LVEF 42%   Low risk stress test   Saw Dr Emmanuel in past   ECG today marked sinus bradycardia     Cardiac workup test results as below   Coronary CT angiography report as below:non obstructive coronary artery disease     Last echo LVEF 70%    Subjective      Subjective    Mild chronic exertional shortness of breath on exertion relieved with rest  No significant cough or wheezing    No further palpitations  No associated chest pain  No significant pedal edema    No fever or chills  No significant expectoration    No hemoptysis  No presyncope or syncope    Tolerating current medications well with no untoward side effects   Compliant with prescribed medication regimen. Tries to adhere to cardiac diet.     Good  effort tolerance with no cardiovascular limitations and at the patient's baseline   Walks 3 miles a day or bicycles for 1 hour    No bleeding, excessive bruising, gait instability or fall risks    No new events or complaints since last visit         History of present illness:  David Schulz is a 71 y.o. yo male with history of Paroxysmal atrial fibrillation  who presents today for   Chief Complaint   Patient presents with   • PSVT     6mo- no issues.    .    History  Past Medical History:   Diagnosis Date   • Acute retention of urine    • Atrial fibrillation (CMS/HCC)    • BPH (benign prostatic hyperplasia)    • Hyperthyroidism    • Microscopic hematuria    • Peyronie's disease    ,   Past Surgical History:   Procedure Laterality Date   • CARPAL TUNNEL RELEASE     • CLAVICLE SURGERY     • THYROIDECTOMY     • THYROIDECTOMY     • TOTAL HIP ARTHROPLASTY     • VASECTOMY     ,   Family History   Problem Relation Age of Onset   • Heart disease Father    • Heart attack Father    • No  Known Problems Mother    • Heart attack Brother    • Heart disease Brother    • Heart disease Paternal Grandfather    • Heart attack Paternal Grandfather    ,   Social History     Tobacco Use   • Smoking status: Former Smoker   • Smokeless tobacco: Never Used   Vaping Use   • Vaping Use: Never used   Substance Use Topics   • Alcohol use: Yes   • Drug use: Never   ,     Medications  Current Outpatient Medications   Medication Sig Dispense Refill   • apixaban (ELIQUIS) 5 MG tablet tablet Take 1 tablet by mouth Every 12 (Twelve) Hours. 60 tablet 11   • gabapentin (NEURONTIN) 300 MG capsule Take 300 mg by mouth 2 (Two) Times a Day.     • L-Lysine 500 MG tablet tablet      • levothyroxine (SYNTHROID, LEVOTHROID) 125 MCG tablet      • levothyroxine (SYNTHROID, LEVOTHROID) 137 MCG tablet Take 137 mcg by mouth Daily.     • lisinopril (PRINIVIL,ZESTRIL) 5 MG tablet TAKE 1 TABLET BY MOUTH EVERY DAY 90 tablet 3   • QUEtiapine (SEROquel) 50 MG tablet 1 TABLET(S) BY MOUTH NIGHTLY  3   • tamsulosin (FLOMAX) 0.4 MG capsule 24 hr capsule Take 1 capsule by mouth every night at bedtime. 90 capsule 3   • multivitamin with minerals (MULTIVITAMIN ADULT PO) Take 1 tablet by mouth Daily.       No current facility-administered medications for this visit.       Allergies:  Morphine, Morphine and related, and Carvedilol    Review of Systems  Review of Systems   Constitutional: Positive for malaise/fatigue.   HENT: Negative.    Eyes: Negative.    Cardiovascular: Positive for dyspnea on exertion and palpitations. Negative for chest pain, claudication, cyanosis, irregular heartbeat, leg swelling, near-syncope, orthopnea, paroxysmal nocturnal dyspnea and syncope.   Respiratory: Negative.    Endocrine: Negative.    Hematologic/Lymphatic: Negative.    Skin: Negative.    Musculoskeletal: Positive for arthritis.   Gastrointestinal: Negative for anorexia.   Genitourinary: Negative.    Neurological: Positive for dizziness and light-headedness.  "  Psychiatric/Behavioral: Negative.        Objective     Physical Exam:  /70   Pulse 51   Ht 175.3 cm (69\")   Wt 75.3 kg (166 lb)   BMI 24.51 kg/m²      Physical Exam   Constitutional: He appears well-developed.   HENT:   Head: Normocephalic.   Neck: Normal carotid pulses and no JVD present. No tracheal tenderness present. Carotid bruit is not present. No tracheal deviation present.   Cardiovascular: Regular rhythm, normal heart sounds and normal pulses.   Pulmonary/Chest: Effort normal. No stridor.   Abdominal: Soft. He exhibits no distension. There is no abdominal tenderness.   Neurological: He is alert. No cranial nerve deficit or sensory deficit.   Skin: Skin is warm.   Psychiatric: His speech is normal and behavior is normal.          Results Review:      David Schulz   Holter Monitor - 24 Hour - Record/Scan Analysis With Report/Interp ([id number]) Clinic   Order# [id number]   Reading physician: Jose Manuel Akhtar MD Ordering physician: Jose Manuel Akhtar MD Study date: 20   Patient Information     Patient Name  David Schulz MRN  [id number] Sex  Male  (Age)  1950 (70 y.o.)   Interpretation Summary        · Average HR: 56. Min HR: 41. Max HR: 187.     · Entire report was reviewed.  Monitoring in days: ~1   · The predominant rhythm noted during the testing period was sinus rhythm.     · Occasional supraventricular ectopics with an APC burden of: 1.7 %  · 7 runs of supraventricular tachycardia longest 12 beats at a maximum rate of 162 bpm and an average of 132 bpm         · Rare premature ventricular contractions with a PVC burden of: < 1%  · Single 4 beat run of nonsustained ventricular tachycardia at a maximum rate of 187 bpm and an average of 131 bpm     · No correlated arrhythmia  · No significant pauses           Conclusion: Baseline rhythm is sinus.  Occasional supraventricular ectopics with an APC burden of 1.7%  7 runs of supraventricular tachycardia longest 12 beats at a maximum rate of 162 " bpm and an average rate of 132 bpm  Single 4 beat run of nonsustained ventricular tachycardia at a maximum rate of 187 bpm and an average of 131 bpm            Cardiac CT angiography 7/15/2020     Impression:      1. Total calcium score : 34.6  2. Mild focal calcification of the left anterior descending coronary artery without any high-grade stenotic lesions noted in any of the epicardial coronary arteries.  3.  Slight myocardial bridging noted of the mid left anterior descending coronary artery       David Schulz   Holter Monitor 72Hr-21Day (0296T/0298T)   Order# [id number]   Reading physician: Jose Manule Akhtar MD Ordering physician: Jose Manuel Akhtar MD Study date: 20   Patient Information     Patient Name  David Schulz MRN  [id number] Sex  Male  (Age)  1950 (70 y.o.)   Interpretation Summary        · Average HR: 54. Min HR: 41. Max HR: 179.     · Entire report was reviewed.  Monitoring in days: ~13   · The predominant rhythm noted during the testing period was sinus rhythm.     · Rare supraventricular ectopics with an APC burden of: < 1%  · 65 runs of supraventricular tachycardia longest 11.4 seconds at a maximum rate of 179 bpm and an average rate of 138 bpm         · Rare premature ventricular contractions with a PVC burden of: < 1%  · 2 runs of ventricular tachycardia longest 9 beats at a maximum rate of 162 bpm and an average rate of 143 bpm     · No correlated arrhythmia  · No significant pauses           Conclusion: Baseline rhythm is sinus.  Slowest heart rate of 41 bpm consisting of sinus bradycardia at 12:53 PM  65 runs of supraventricular tachycardia longest 11.4 seconds at a maximum rate of 179 bpm and an average rate of 138 bpm  2 runs of nonsustained ventricular tachycardia longest 9 beats at a maximum rate of 162 bpm and an average rate of 143 bpm            Results for orders placed during the hospital encounter of 20    Adult Transthoracic Echo Limited W/ Cont if Necessary Per  Protocol    Interpretation Summary  · Estimated EF = 70%.  · Left ventricular systolic function is normal.  · Left atrial cavity size is mildly dilated.  · No evidence of pulmonary hypertension is present.     Study Location: Echo Lab  Technical Quality: Adequate visualization    Patient Status: Inpatient    Rhythm: Atrial fibrillation     Conclusions     Summary   Mitral valve leaflets are mildly thickened with preserved leaflet   mobility.   Trace mitral regurgitation.   Mildly thickened aortic valve leaflets with preserved leaflet mobility.   Aortic valve appears to be tricuspid.   No evidence of aortic stenosis; trivial aortic regurgitation.   Trivial tricuspid regurgitation with estimated RVSP of 19 mm Hg.   Mildly dilated left atrium.   Left ventricular size is normal .   Mild concentric left ventricular hypertrophy.   mildly decreased ejection fraction.   Unable to obtain EF due to rhythm.   Left ventricular ejection fraction is estimated at 42%.     Signature     ----------------------------------------------------------------   Electronically signed by Robin Hitchcock MD(Interpreting   physician) on 09/01/2019 05:24 PM   ----------------------------------------------------------------       ECG 12 Lead    Date/Time: 9/28/2021 9:15 AM  Performed by: Jose Manuel Akhtar MD  Authorized by: Jose Manuel Akhtar MD   Comparison: compared with previous ECG from 2/25/2021  Comparison to previous ECG: Ventricular rate changed from  52    to   51   beats per minute    Rhythm: sinus bradycardia  Rate: bradycardic  Conduction: conduction normal  ST Segments: ST segments normal  T Waves: T waves normal  Other: no other findings    Clinical impression: abnormal EKG        ____________________________________________________________________________________________________________________________________________  Health maintenance and recommendations    Low salt/ HTN/ Heart healthy carbohydrate restricted cardiac diet (printed dietary  and general instructions provided for home review )  The patient is advised to reduce or avoid caffeine or other cardiac stimulants.     The patient was advised to avoid long term NSAIDS , use Tylenol PRN instead  Avoid cardiac stimulants including common drugs like Pseudoephedrine or excessive amounts of caffeine  Monitor for any signs of bleeding including red or dark stools. Fall precautions.   Advised staying uptodate with immunizations per established standard guidelines.  Offered to give patient  a copy      Questions were encouraged, asked and answered to the patient's  understanding and satisfaction. Questions if any regarding current medications and side effects, need for refills and importance of compliance to medications stressed.    Reviewed available prior notes, consults, prior visits, laboratory findings, radiology and cardiology relevant reports. Updated chart as applicable. I have reviewed the patient's medical history in detail and updated the computerized patient record as relevant.      Updated patient regarding any new or relevant abnormalities on review of records or any new findings on physical exam. Mentioned to patient about purpose of visit and desirable health short and long term goals and objectives.    Primary to monitor CBC CMP Lipid panel and TSH as applicable    ___________________________________________________________________________________________________________________________________________          Assessment/Plan   Diagnoses and all orders for this visit:    1. PAF (paroxysmal atrial fibrillation) (CMS/HCC) (Primary)    2. Paroxysmal SVT (supraventricular tachycardia) (CMS/HCC)    3. Non-ischemic cardiomyopathy (CMS/HCC)    4. NSVT (nonsustained ventricular tachycardia) (CMS/HCC)    5. Current use of long term anticoagulation    Other orders  -     ECG 12 Lead           Plan    Monitor LVEF by serial echo in future   Overall doing well no new cardiovascular symptoms and  therefore no additional cardiac testing is required   If any interim issues arise will call me for further evaluation.     The current medical regimen is effective;  continue present plan and medications.   He has stopped Coreg     Patient expressed understanding  Encouraged and answered all questions   Discussed with the patient and all questioned fully answered. He will call me if any problems arise.   Discussed results of prior testing with patient : echo and outpatient cardiac telemetry   as well electrocardiogram from today       If has significant tachy sridevi syndrome will need a pacer in future   No indication for pacer now     Check BP and heart rates twice daily at least 3x / week, week a month  at home and bring a recording for me to review next visit  If BP >130/85 or < 100/60 persistently over 3 reading 30 mins apart call sooner      I support the patient's decision to take the Covid -19 vaccine   Had required complete course   No major issues   Now fully immunized    Recommend booster       Follow up with Becka GRADY to address interim issues             Return in about 6 months (around 3/28/2022).

## 2021-10-15 ENCOUNTER — HOSPITAL ENCOUNTER (OUTPATIENT)
Dept: ULTRASOUND IMAGING | Age: 71
Discharge: HOME | End: 2021-10-15
Payer: MEDICARE

## 2021-10-15 DIAGNOSIS — N50.819 PAIN IN TESTICLE, UNSPECIFIED LATERALITY: ICD-10-CM

## 2021-10-15 PROCEDURE — 76870 US EXAM SCROTUM: CPT

## 2021-11-01 ENCOUNTER — OFFICE VISIT (OUTPATIENT)
Dept: UROLOGY | Age: 71
End: 2021-11-01
Payer: MEDICARE

## 2021-11-01 VITALS — TEMPERATURE: 97.1 F | BODY MASS INDEX: 25.1 KG/M2 | WEIGHT: 169.5 LBS | HEIGHT: 69 IN

## 2021-11-01 DIAGNOSIS — R39.12 BENIGN PROSTATIC HYPERPLASIA WITH WEAK URINARY STREAM: Primary | ICD-10-CM

## 2021-11-01 DIAGNOSIS — N40.1 BENIGN PROSTATIC HYPERPLASIA WITH WEAK URINARY STREAM: Primary | ICD-10-CM

## 2021-11-01 PROCEDURE — 1036F TOBACCO NON-USER: CPT | Performed by: UROLOGY

## 2021-11-01 PROCEDURE — G8484 FLU IMMUNIZE NO ADMIN: HCPCS | Performed by: UROLOGY

## 2021-11-01 PROCEDURE — G8427 DOCREV CUR MEDS BY ELIG CLIN: HCPCS | Performed by: UROLOGY

## 2021-11-01 PROCEDURE — 51798 US URINE CAPACITY MEASURE: CPT | Performed by: UROLOGY

## 2021-11-01 PROCEDURE — 1123F ACP DISCUSS/DSCN MKR DOCD: CPT | Performed by: UROLOGY

## 2021-11-01 PROCEDURE — 99214 OFFICE O/P EST MOD 30 MIN: CPT | Performed by: UROLOGY

## 2021-11-01 PROCEDURE — 81002 URINALYSIS NONAUTO W/O SCOPE: CPT | Performed by: UROLOGY

## 2021-11-01 PROCEDURE — G8417 CALC BMI ABV UP PARAM F/U: HCPCS | Performed by: UROLOGY

## 2021-11-01 PROCEDURE — 3017F COLORECTAL CA SCREEN DOC REV: CPT | Performed by: UROLOGY

## 2021-11-01 PROCEDURE — 4040F PNEUMOC VAC/ADMIN/RCVD: CPT | Performed by: UROLOGY

## 2021-11-01 RX ORDER — TAMSULOSIN HYDROCHLORIDE 0.4 MG/1
0.8 CAPSULE ORAL NIGHTLY
Qty: 180 CAPSULE | Refills: 3 | Status: SHIPPED | OUTPATIENT
Start: 2021-11-01

## 2021-11-01 ASSESSMENT — ENCOUNTER SYMPTOMS
NAUSEA: 0
EYE REDNESS: 0
CHEST TIGHTNESS: 0
SORE THROAT: 0
VOMITING: 0
EYE DISCHARGE: 0
WHEEZING: 0
BACK PAIN: 0
FACIAL SWELLING: 0

## 2021-11-01 NOTE — PROGRESS NOTES
Huang Urbina is a 66 y.o. male who presents today   Chief Complaint   Patient presents with    Follow-up     I am here for a 6 month FU. I had my PSA done prior, results in media. BPH / LUTS:  Patient is here today for lower urinary tract symptoms which started  year(s) ago. Recently the urinary symptoms are: are worsening since she was last seen  Current medical Rx for BPH/LUTS: Tamsulosin 0.4  Previous treatments tried for LUTS: Medical treatment with alpha-blocker  Previous surgical intervention: none  Urinary retention: No  Any associated gross hematuria? no  History of recurrent UTIs: no  His AUA Symptom Score is, 17/35   Patient states symptoms are of moderate severity. Patient complains of incomplete emptying frequency decreased force of stream straining and nocturia x1.   His postvoid residual today by bladder scan was 101    Past Medical History:   Diagnosis Date    Bipolar disorder ([address]     Colon polyps     Hypothyroidism     Insomnia        Past Surgical History:   Procedure Laterality Date    CARPAL TUNNEL RELEASE Right     approx 5 years ago    CLAVICLE SURGERY      x 2    COLONOSCOPY  ? 5-6 years ago        COLONOSCOPY N/A 12/19/2014    Monica:  normal,   5y recall    COLONOSCOPY N/A 05/05/2021    Dr Lady Mendoza, Benign Serrated Polyps (SSA -dysplasia, HP), Moderate diverticulosis, No clear-cut lesions to explain proctalgia/perianal itching, 1 year recall    FOOT SURGERY      x 2    HERNIA REPAIR      JOINT REPLACEMENT Right     hip    THYROIDECTOMY      VARICOSE VEIN SURGERY      VASECTOMY         Current Outpatient Medications   Medication Sig Dispense Refill    GABAPENTIN PO Take 500 mg by mouth daily      Vitamins/Minerals TABS Take 1 tablet by mouth daily      tamsulosin (FLOMAX) 0.4 MG capsule Take 1 capsule by mouth nightly 90 capsule 3    lisinopril (PRINIVIL;ZESTRIL) 5 MG tablet Take 5 mg by mouth daily      ELIQUIS 5 MG TABS tablet TAKE 1 TABLET BY MOUTH TWICE A DAY 60 tablet 5    QUEtiapine (SEROQUEL) 50 MG tablet Take 50 mg by mouth nightly.  levothyroxine (SYNTHROID) 137 MCG tablet Take 137 mcg by mouth Daily        No current facility-administered medications for this visit. Allergies   Allergen Reactions    Morphine Itching    Coreg [Carvedilol] Nausea And Vomiting       Social History     Socioeconomic History    Marital status: Single     Spouse name: None    Number of children: None    Years of education: None    Highest education level: None   Occupational History    None   Tobacco Use    Smoking status: Never Smoker    Smokeless tobacco: Never Used   Vaping Use    Vaping Use: Never used   Substance and Sexual Activity    Alcohol use: Yes     Alcohol/week: 3.0 standard drinks     Types: 3 Glasses of wine per week     Comment: weekly    Drug use: No    Sexual activity: None   Other Topics Concern    None   Social History Narrative    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)    Here with his significant other    Previously  and     He has 1 son and 1 daughter    Education masters degree in fine arts    Former distance runner also bicyclist not so much these days    Smoked minimally 3 to 4 years total many years ago denies alcohol consumption or substance usage     Social Determinants of Health     Financial Resource Strain:     Difficulty of Paying Living Expenses:    Food Insecurity:     Worried About Running Out of Food in the Last Year:     [address] in the Last Year:    Transportation Needs:     Lack of Transportation (Medical):      Lack of Transportation (Non-Medical):    Physical Activity:     Days of Exercise per Week:     Minutes of Exercise per Session:    Stress:     Feeling of Stress :    Social Connections:     Frequency of Communication with Friends and Family:     Frequency of Social Gatherings with Friends and Family:     Attends Rastafari Services:     Active Member of Clubs or Organizations:     Attends Club or Organization Meetings:     Marital Status:    Intimate Partner Violence:     Fear of Current or Ex-Partner:     Emotionally Abused:     Physically Abused:     Sexually Abused:        Family History   Problem Relation Age of Onset   [facility] Dementia Mother     Obesity Mother     Stroke Father     Heart Attack Father     Other Sister     Other Brother         reheumatic fever- valve issue    Colon Cancer Neg Hx     Colon Polyps Neg Hx     Esophageal Cancer Neg Hx     Liver Cancer Neg Hx     Rectal Cancer Neg Hx     Stomach Cancer Neg Hx        REVIEW OF SYSTEMS:  Review of Systems   Constitutional: Negative for chills and fever. HENT: Negative for facial swelling and sore throat. Eyes: Negative for discharge and redness. Respiratory: Negative for chest tightness and wheezing. Cardiovascular: Negative for chest pain and palpitations. Gastrointestinal: Negative for nausea and vomiting. Endocrine: Negative for polyphagia and polyuria. Genitourinary: Negative for decreased urine volume, difficulty urinating, discharge, dysuria, enuresis, flank pain, frequency, genital sores, hematuria, penile pain, penile swelling, scrotal swelling, testicular pain and urgency. Musculoskeletal: Negative for back pain and neck stiffness. Skin: Negative for rash and wound. Neurological: Negative for dizziness and headaches. Hematological: Negative for adenopathy. Does not bruise/bleed easily. Psychiatric/Behavioral: Negative for confusion and hallucinations. PHYSICAL EXAM:  Temp 97.1 °F (36.2 °C)   Ht 5' 9\" (1.753 m)   Wt 169 lb 8 oz (76.9 kg)   BMI 25.03 kg/m²   Physical Exam  Constitutional:       General: He is not in acute distress. Appearance: Normal appearance. He is well-developed. HENT:      Head: Normocephalic and atraumatic. Nose: Nose normal.   Eyes:      General: No scleral icterus.      Conjunctiva/sclera: Conjunctivae normal.      Pupils: Pupils are equal, round, and reactive to light. Neck:      Trachea: No tracheal deviation. Cardiovascular:      Rate and Rhythm: Normal rate and regular rhythm. Pulmonary:      Effort: Pulmonary effort is normal. No respiratory distress. Breath sounds: No stridor. Abdominal:      General: There is no distension. Palpations: Abdomen is soft. There is no mass. Tenderness: There is no abdominal tenderness. Genitourinary:     Comments: Previous [facility] patient had normal prostate not enlarged  Musculoskeletal:         General: No tenderness. Normal range of motion. Cervical back: Normal range of motion and neck supple. Lymphadenopathy:      Cervical: No cervical adenopathy. Skin:     General: Skin is warm and dry. Findings: No erythema. Neurological:      Mental Status: He is alert and oriented to person, place, and time. Psychiatric:         Behavior: Behavior normal.         Judgment: Judgment normal.             DATA:    Results for orders placed or performed in visit on 11/01/21   POCT Urinalysis no Micro   Result Value Ref Range    Color, UA yellow     Clarity, UA clear     Glucose, UA POC neg     Bilirubin, UA neg     Ketones, UA neg     Spec Grav, UA 1.015     Blood, UA POC neg     pH, UA 7.0     Protein, UA POC neg     Urobilinogen, UA 0.2     Leukocytes, UA neg     Nitrite, UA neg     Appearance, Fluid Clear Clear, Slightly Cloudy     Lab Results   Component Value Date    PSA 0.26 02/17/2020     PSA done at outside lab on 10/26/2021 was 0.20 ng/ML    1. Benign prostatic hyperplasia with weak urinary stream  He subjectively feels like he is symptoms have gotten worse. We will try him on a higher dose of tamsulosin. Given the fact his prostate is smaller I do not think finasteride is a good option.   We discussed minimally invasive treatment with Rezum as well as GLAP and TURP  - POCT Urinalysis no Micro  - AL Measure, post-void residual, US, non-imaging  - tamsulosin (FLOMAX) 0.4 MG capsule; Take 2 capsules by mouth nightly  Dispense: 180 capsule; Refill: 3      Orders Placed This Encounter   Procedures    POCT Urinalysis no Micro    KS Measure, post-void residual, US, non-imaging     101ml        Return in about 3 months (around 2/1/2022). All information inputted into the note by the MA to include chief complaint, past medical history, past surgical history, medications, allergies, social and family history and review of systems has been reviewed and updated as needed by me. EMR Dragon/transcription disclaimer: Much of this documentt is electronic  transcription/translation of spoken language to printed text. The  electronic translation of spoken language may be erroneous, or at times,  nonsensical words or phrases may be inadvertently transcribed.  Although I  have reviewed the document for such errors, some may still exist.

## 2021-11-12 ENCOUNTER — TELEPHONE (OUTPATIENT)
Dept: CARDIOLOGY | Facility: CLINIC | Age: 71
End: 2021-11-12

## 2021-11-12 NOTE — TELEPHONE ENCOUNTER
PT EMAIL - PLEASE ADVISE    For the past several weeks I have been experiencing a mostly constant low grade headache. I'm not sure if it is sinuses or something  else. I rarely ever have headaches. I am also experiencing lightheadedness and shortness of breath much of the day and intensifies when doing moderate exercise or playing my trombone. In the mornings, i have been checking my heart rate and blood pressure  and usually both are good. However, my normal low heart rate is getting higher. I have an inexpensive smart watch that tracts my heart rate.  It indicates that my heart rate especially overnight can range from 150 to 54.  I have never experienced these conditions for this long of a period. Thank you!

## 2021-11-12 NOTE — TELEPHONE ENCOUNTER
Soonest available was with Dr. Akhtar on the 17th. Appt was made and patient was called and was left a VM with date and time.

## 2021-11-17 ENCOUNTER — OFFICE VISIT (OUTPATIENT)
Dept: CARDIOLOGY | Facility: CLINIC | Age: 71
End: 2021-11-17

## 2021-11-17 VITALS
OXYGEN SATURATION: 98 % | SYSTOLIC BLOOD PRESSURE: 136 MMHG | WEIGHT: 167 LBS | HEART RATE: 55 BPM | HEIGHT: 69 IN | BODY MASS INDEX: 24.73 KG/M2 | DIASTOLIC BLOOD PRESSURE: 62 MMHG

## 2021-11-17 DIAGNOSIS — I48.0 PAF (PAROXYSMAL ATRIAL FIBRILLATION) (HCC): Chronic | ICD-10-CM

## 2021-11-17 DIAGNOSIS — R51.9 OCCIPITAL HEADACHE: ICD-10-CM

## 2021-11-17 DIAGNOSIS — R06.09 DOE (DYSPNEA ON EXERTION): ICD-10-CM

## 2021-11-17 DIAGNOSIS — I42.8 NON-ISCHEMIC CARDIOMYOPATHY (HCC): Chronic | ICD-10-CM

## 2021-11-17 DIAGNOSIS — E03.9 HYPOTHYROIDISM (ACQUIRED): ICD-10-CM

## 2021-11-17 DIAGNOSIS — I47.1 PAROXYSMAL SVT (SUPRAVENTRICULAR TACHYCARDIA) (HCC): ICD-10-CM

## 2021-11-17 DIAGNOSIS — I47.29 NSVT (NONSUSTAINED VENTRICULAR TACHYCARDIA) (HCC): Primary | ICD-10-CM

## 2021-11-17 PROCEDURE — 99214 OFFICE O/P EST MOD 30 MIN: CPT | Performed by: INTERNAL MEDICINE

## 2021-11-17 PROCEDURE — 93000 ELECTROCARDIOGRAM COMPLETE: CPT | Performed by: INTERNAL MEDICINE

## 2021-11-17 NOTE — PROGRESS NOTES
David Schulz  [id number]  1950  71 y.o.  male        Referring Provider: Ingrid Vergara MD    Reason for  Visit:    Patient called to be seen due to new symptoms of higher heart rates  Initial visit for virtual visit using above modality   Echo in [place] LVEF 42%   Low risk stress test   Saw Dr Emmanuel in past   ECG today marked sinus bradycardia     Cardiac workup test results as below   Coronary CT angiography report as below:non obstructive coronary artery disease     Last echo LVEF 70%    Subjective      Has higher higher heart rates   Associated dizziness and weakness   Lasts for for mins   Says smart watch heart rates goes up to 150 BPM   Occurs both day and night   Smart watch does not have rhythm strip    Mild chronic exertional shortness of breath on exertion relieved with rest  No significant cough or wheezing  No associated chest pain  No significant pedal edema    No fever or chills  No significant expectoration    No hemoptysis  No presyncope or syncope    Tolerating current medications well with no untoward side effects   Compliant with prescribed medication regimen. Tries to adhere to cardiac diet.     Walks some but now gets light headed and more shortness of breath   Easy fatiguability   Feels tired     No bleeding, excessive bruising, gait instability or fall risks    No new events or complaints since last visit     Dull occipital headache for 3 weeks   BP well controlled at home.       History of present illness:  David Schulz is a 71 y.o. yo male with paroxysmal atrial fibrillation  who presents today for   Chief Complaint   Patient presents with   • paf     add on- increased HR, overall not feeling well. SOB, lightheaded and no energy.    .    History  Past Medical History:   Diagnosis Date   • Acute retention of urine    • Atrial fibrillation (HCC)    • BPH (benign prostatic hyperplasia)    • Hyperthyroidism    • Microscopic hematuria    • Peyronie's disease    ,   Past  Surgical History:   Procedure Laterality Date   • CARPAL TUNNEL RELEASE     • CLAVICLE SURGERY     • THYROIDECTOMY     • THYROIDECTOMY     • TOTAL HIP ARTHROPLASTY     • VASECTOMY     ,   Family History   Problem Relation Age of Onset   • Heart disease Father    • Heart attack Father    • No Known Problems Mother    • Heart attack Brother    • Heart disease Brother    • Heart disease Paternal Grandfather    • Heart attack Paternal Grandfather    ,   Social History     Tobacco Use   • Smoking status: Former Smoker   • Smokeless tobacco: Never Used   Vaping Use   • Vaping Use: Never used   Substance Use Topics   • Alcohol use: Yes   • Drug use: Never   ,     Medications  Current Outpatient Medications   Medication Sig Dispense Refill   • apixaban (ELIQUIS) 5 MG tablet tablet Take 1 tablet by mouth Every 12 (Twelve) Hours. 60 tablet 11   • gabapentin (NEURONTIN) 300 MG capsule Take 300 mg by mouth 2 (Two) Times a Day.     • L-Lysine 500 MG tablet tablet      • levothyroxine (SYNTHROID, LEVOTHROID) 137 MCG tablet Take 137 mcg by mouth Daily.     • lisinopril (PRINIVIL,ZESTRIL) 5 MG tablet TAKE 1 TABLET BY MOUTH EVERY DAY 90 tablet 3   • multivitamin with minerals (MULTIVITAMIN ADULT PO) Take 1 tablet by mouth Daily.     • QUEtiapine (SEROquel) 50 MG tablet 1 TABLET(S) BY MOUTH NIGHTLY  3   • tamsulosin (FLOMAX) 0.4 MG capsule 24 hr capsule Take 1 capsule by mouth every night at bedtime. 90 capsule 3   • levothyroxine (SYNTHROID, LEVOTHROID) 125 MCG tablet        No current facility-administered medications for this visit.       Allergies:  Morphine, Morphine and related, and Carvedilol    Review of Systems  Review of Systems   Constitutional: Positive for malaise/fatigue.   Eyes: Negative.    Cardiovascular: Positive for dyspnea on exertion and palpitations. Negative for chest pain, claudication, cyanosis, irregular heartbeat, leg swelling, near-syncope, orthopnea, paroxysmal nocturnal dyspnea and syncope.  "  Respiratory: Negative.    Endocrine: Negative.    Hematologic/Lymphatic: Negative.    Skin: Negative.    Musculoskeletal: Positive for arthritis.   Gastrointestinal: Negative for anorexia.   Genitourinary: Negative.    Neurological: Positive for dizziness, headaches and light-headedness.   Psychiatric/Behavioral: Negative.        Objective     Physical Exam:  /62   Pulse 55   Ht 175.3 cm (69\")   Wt 75.8 kg (167 lb)   SpO2 98%   BMI 24.66 kg/m²      Physical Exam   Constitutional: He appears well-developed.   HENT:   Head: Normocephalic.   Neck: Normal carotid pulses and no JVD present. No tracheal tenderness present. Carotid bruit is not present. No tracheal deviation present.   Cardiovascular: Regular rhythm, normal heart sounds and normal pulses.   Pulmonary/Chest: Effort normal. No stridor.   Abdominal: Soft. He exhibits no distension. There is no abdominal tenderness.   Neurological: He is alert. No cranial nerve deficit or sensory deficit.   Skin: Skin is warm.   Psychiatric: His speech is normal and behavior is normal.          Results Review:      David Schulz   Holter Monitor - 24 Hour - Record/Scan Analysis With Report/Interp ([id number]) Clinic   Order# [id number]   Reading physician: Jose Manuel Akhtar MD Ordering physician: Jose Manuel Akhtar MD Study date: 20   Patient Information     Patient Name  David Schulz MRN  [id number] Sex  Male  (Age)  1950 (70 y.o.)   Interpretation Summary        · Average HR: 56. Min HR: 41. Max HR: 187.     · Entire report was reviewed.  Monitoring in days: ~1   · The predominant rhythm noted during the testing period was sinus rhythm.     · Occasional supraventricular ectopics with an APC burden of: 1.7 %  · 7 runs of supraventricular tachycardia longest 12 beats at a maximum rate of 162 bpm and an average of 132 bpm         · Rare premature ventricular contractions with a PVC burden of: < 1%  · Single 4 beat run of nonsustained ventricular tachycardia at " a maximum rate of 187 bpm and an average of 131 bpm     · No correlated arrhythmia  · No significant pauses           Conclusion: Baseline rhythm is sinus.  Occasional supraventricular ectopics with an APC burden of 1.7%  7 runs of supraventricular tachycardia longest 12 beats at a maximum rate of 162 bpm and an average rate of 132 bpm  Single 4 beat run of nonsustained ventricular tachycardia at a maximum rate of 187 bpm and an average of 131 bpm            Cardiac CT angiography 7/15/2020     Impression:      1. Total calcium score : 34.6  2. Mild focal calcification of the left anterior descending coronary artery without any high-grade stenotic lesions noted in any of the epicardial coronary arteries.  3.  Slight myocardial bridging noted of the mid left anterior descending coronary artery       David Schulz   Holter Monitor 72Hr-21Day (0296T/0298T)   Order# [id number]   Reading physician: Jose Manuel Akhtar MD Ordering physician: Jose Manuel Akhtar MD Study date: 20   Patient Information     Patient Name  David Schulz MRN  [id number] Sex  Male  (Age)  1950 (70 y.o.)   Interpretation Summary        · Average HR: 54. Min HR: 41. Max HR: 179.     · Entire report was reviewed.  Monitoring in days: ~13   · The predominant rhythm noted during the testing period was sinus rhythm.     · Rare supraventricular ectopics with an APC burden of: < 1%  · 65 runs of supraventricular tachycardia longest 11.4 seconds at a maximum rate of 179 bpm and an average rate of 138 bpm         · Rare premature ventricular contractions with a PVC burden of: < 1%  · 2 runs of ventricular tachycardia longest 9 beats at a maximum rate of 162 bpm and an average rate of 143 bpm     · No correlated arrhythmia  · No significant pauses           Conclusion: Baseline rhythm is sinus.  Slowest heart rate of 41 bpm consisting of sinus bradycardia at 12:53 PM  65 runs of supraventricular tachycardia longest 11.4 seconds at a maximum rate of  179 bpm and an average rate of 138 bpm  2 runs of nonsustained ventricular tachycardia longest 9 beats at a maximum rate of 162 bpm and an average rate of 143 bpm            Results for orders placed during the hospital encounter of 06/08/20    Adult Transthoracic Echo Limited W/ Cont if Necessary Per Protocol    Interpretation Summary  · Estimated EF = 70%.  · Left ventricular systolic function is normal.  · Left atrial cavity size is mildly dilated.  · No evidence of pulmonary hypertension is present.     Study Location: Echo Lab  Technical Quality: Adequate visualization    Patient Status: Inpatient    Rhythm: Atrial fibrillation     Conclusions     Summary   Mitral valve leaflets are mildly thickened with preserved leaflet   mobility.   Trace mitral regurgitation.   Mildly thickened aortic valve leaflets with preserved leaflet mobility.   Aortic valve appears to be tricuspid.   No evidence of aortic stenosis; trivial aortic regurgitation.   Trivial tricuspid regurgitation with estimated RVSP of 19 mm Hg.   Mildly dilated left atrium.   Left ventricular size is normal .   Mild concentric left ventricular hypertrophy.   mildly decreased ejection fraction.   Unable to obtain EF due to rhythm.   Left ventricular ejection fraction is estimated at 42%.     Signature     ----------------------------------------------------------------   Electronically signed by Robin Hitchcock MD(Interpreting   physician) on 09/01/2019 05:24 PM   ----------------------------------------------------------------       ECG 12 Lead    Date/Time: 11/17/2021 10:24 AM  Performed by: Jose Manuel Akhtar MD  Authorized by: Jose Manuel Akhtar MD   Comparison: compared with previous ECG from 9/28/2021  Comparison to previous ECG: Ventricular rate changed from  51    to 53  beats per minute    Rhythm: sinus bradycardia  Rate: bradycardic  Conduction: conduction normal  ST Segments: ST segments normal  T Waves: T waves normal  QRS axis: normal  Other: no other  findings    Clinical impression: abnormal EKG        ____________________________________________________________________________________________________________________________________________  Health maintenance and recommendations    Low salt/ HTN/ Heart healthy carbohydrate restricted cardiac diet (printed dietary and general instructions provided for home review )  The patient is advised to reduce or avoid caffeine or other cardiac stimulants.     The patient was advised to avoid long term NSAIDS , use Tylenol PRN instead  Avoid cardiac stimulants including common drugs like Pseudoephedrine or excessive amounts of caffeine  Monitor for any signs of bleeding including red or dark stools. Fall precautions.   Advised staying uptodate with immunizations per established standard guidelines.  Offered to give patient  a copy      Questions were encouraged, asked and answered to the patient's  understanding and satisfaction. Questions if any regarding current medications and side effects, need for refills and importance of compliance to medications stressed.    Reviewed available prior notes, consults, prior visits, laboratory findings, radiology and cardiology relevant reports. Updated chart as applicable. I have reviewed the patient's medical history in detail and updated the computerized patient record as relevant.      Updated patient regarding any new or relevant abnormalities on review of records or any new findings on physical exam. Mentioned to patient about purpose of visit and desirable health short and long term goals and objectives.    Primary to monitor CBC CMP Lipid panel and TSH as applicable    ___________________________________________________________________________________________________________________________________________          Assessment/Plan   Diagnoses and all orders for this visit:    1. NSVT (nonsustained ventricular tachycardia) (HCC) (Primary)  -     Adult Transthoracic Echo Complete  w/ Color, Spectral and Contrast if necessary per protocol; Future  -     Holter Monitor - 72 Hour Up To 15 Days; Future    2. PAF (paroxysmal atrial fibrillation) (HCC)  -     Adult Transthoracic Echo Complete w/ Color, Spectral and Contrast if necessary per protocol; Future    3. Paroxysmal SVT (supraventricular tachycardia) (HCC)    4. Non-ischemic cardiomyopathy (HCC)    5. Hypothyroidism (acquired)    6. VALENTINE (dyspnea on exertion)  -     Adult Transthoracic Echo Complete w/ Color, Spectral and Contrast if necessary per protocol; Future  -     Holter Monitor - 72 Hour Up To 15 Days; Future    7. Occipital headache  -     CT Head Without Contrast; Future    Other orders  -     ECG 12 Lead           Plan      Orders Placed This Encounter   Procedures   • CT Head Without Contrast     Standing Status:   Future     Standing Expiration Date:   11/17/2022     Order Specific Question:   Release to patient     Answer:   Immediate   • Holter Monitor - 72 Hour Up To 15 Days     Standing Status:   Future     Standing Expiration Date:   11/17/2022     Order Specific Question:   Reason for exam?     Answer:   Palpitations     Order Specific Question:   Release to patient     Answer:   Immediate   • ECG 12 Lead     This order was created via procedure documentation     Order Specific Question:   Release to patient     Answer:   Immediate   • Adult Transthoracic Echo Complete w/ Color, Spectral and Contrast if necessary per protocol     Myocardial strain to be performed during echocardiogram as long as technically feasible     Standing Status:   Future     Standing Expiration Date:   11/17/2022     Order Specific Question:   Reason for exam?     Answer:   Arrhythmia     Order Specific Question:   Release to patient     Answer:   Immediate      Call for results of above testing.      If has significant tachy sridevi syndrome will need a pacer in future   No indication for pacer now     Check BP and heart rates twice daily at least  3x / week, week a month  at home and bring a recording for me to review next visit  If BP >130/85 or < 100/60 persistently over 3 reading 30 mins apart call sooner      I support the patient's decision to take the Covid -19 vaccine   Had required complete course   No major issues   Now fully immunized    Had booster too      Follow up with Becka GRADY            Return in about 4 weeks (around 12/15/2021).

## 2021-11-26 ENCOUNTER — HOSPITAL ENCOUNTER (OUTPATIENT)
Dept: CT IMAGING | Facility: HOSPITAL | Age: 71
Discharge: HOME | End: 2021-11-26
Admitting: INTERNAL MEDICINE

## 2021-11-26 DIAGNOSIS — R51.9 OCCIPITAL HEADACHE: ICD-10-CM

## 2021-11-26 PROCEDURE — 70450 CT HEAD/BRAIN W/O DYE: CPT

## 2022-01-03 PROCEDURE — U0004 COV-19 TEST NON-CDC HGH THRU: HCPCS | Performed by: NURSE PRACTITIONER

## 2022-01-10 ENCOUNTER — HOSPITAL ENCOUNTER (OUTPATIENT)
Dept: CARDIOLOGY | Facility: HOSPITAL | Age: 72
Discharge: HOME | End: 2022-01-10

## 2022-01-10 VITALS
DIASTOLIC BLOOD PRESSURE: 62 MMHG | HEIGHT: 69 IN | BODY MASS INDEX: 24.73 KG/M2 | SYSTOLIC BLOOD PRESSURE: 136 MMHG | WEIGHT: 167 LBS

## 2022-01-10 DIAGNOSIS — I47.29 NSVT (NONSUSTAINED VENTRICULAR TACHYCARDIA): ICD-10-CM

## 2022-01-10 DIAGNOSIS — R06.09 DOE (DYSPNEA ON EXERTION): ICD-10-CM

## 2022-01-10 DIAGNOSIS — I48.0 PAF (PAROXYSMAL ATRIAL FIBRILLATION): ICD-10-CM

## 2022-01-16 PROCEDURE — U0004 COV-19 TEST NON-CDC HGH THRU: HCPCS | Performed by: NURSE PRACTITIONER

## 2022-02-02 ENCOUNTER — TELEPHONE (OUTPATIENT)
Dept: UROLOGY | Age: 72
End: 2022-02-02

## 2022-02-02 NOTE — TELEPHONE ENCOUNTER
Called and spoke with the patient about our office being closed tomorrow and Friday and told him we'll have to reschedule him, he said he would call back.  I went ahead and put him down for 3/7 at 2:15pm.

## 2022-02-22 ENCOUNTER — HOSPITAL ENCOUNTER (OUTPATIENT)
Dept: CARDIOLOGY | Facility: HOSPITAL | Age: 72
Discharge: HOME | End: 2022-02-22
Admitting: INTERNAL MEDICINE

## 2022-02-22 VITALS
HEIGHT: 69 IN | WEIGHT: 167 LBS | BODY MASS INDEX: 24.73 KG/M2 | SYSTOLIC BLOOD PRESSURE: 136 MMHG | DIASTOLIC BLOOD PRESSURE: 62 MMHG

## 2022-02-22 PROCEDURE — 93306 TTE W/DOPPLER COMPLETE: CPT

## 2022-02-22 PROCEDURE — 93356 MYOCRD STRAIN IMG SPCKL TRCK: CPT | Performed by: INTERNAL MEDICINE

## 2022-02-22 PROCEDURE — 93356 MYOCRD STRAIN IMG SPCKL TRCK: CPT

## 2022-02-22 PROCEDURE — 93306 TTE W/DOPPLER COMPLETE: CPT | Performed by: INTERNAL MEDICINE

## 2022-02-23 LAB
BH CV ECHO LEFT VENTRICLE GLOBAL LONGITUDINAL STRAIN: -14 %
BH CV ECHO MEAS - AO MAX PG (FULL): 4.7 MMHG
BH CV ECHO MEAS - AO MAX PG: 6.3 MMHG
BH CV ECHO MEAS - AO MEAN PG (FULL): 3 MMHG
BH CV ECHO MEAS - AO MEAN PG: 4 MMHG
BH CV ECHO MEAS - AO ROOT AREA (BSA CORRECTED): 2
BH CV ECHO MEAS - AO ROOT AREA: 11.9 CM^2
BH CV ECHO MEAS - AO ROOT DIAM: 3.9 CM
BH CV ECHO MEAS - AO V2 MAX: 125 CM/SEC
BH CV ECHO MEAS - AO V2 MEAN: 92 CM/SEC
BH CV ECHO MEAS - AO V2 VTI: 37.8 CM
BH CV ECHO MEAS - AVA(I,A): 1.9 CM^2
BH CV ECHO MEAS - AVA(I,D): 1.9 CM^2
BH CV ECHO MEAS - AVA(V,A): 2.1 CM^2
BH CV ECHO MEAS - AVA(V,D): 2.1 CM^2
BH CV ECHO MEAS - BSA(HAYCOCK): 1.9 M^2
BH CV ECHO MEAS - BSA: 1.9 M^2
BH CV ECHO MEAS - BZI_BMI: 24.7 KILOGRAMS/M^2
BH CV ECHO MEAS - BZI_METRIC_HEIGHT: 175.3 CM
BH CV ECHO MEAS - BZI_METRIC_WEIGHT: 75.8 KG
BH CV ECHO MEAS - EDV(CUBED): 76.8 ML
BH CV ECHO MEAS - EDV(MOD-SP4): 127 ML
BH CV ECHO MEAS - EDV(TEICH): 80.8 ML
BH CV ECHO MEAS - EF(CUBED): 71.1 %
BH CV ECHO MEAS - EF(MOD-SP4): 68.5 %
BH CV ECHO MEAS - EF(TEICH): 63.1 %
BH CV ECHO MEAS - ESV(CUBED): 22.2 ML
BH CV ECHO MEAS - ESV(MOD-SP4): 40 ML
BH CV ECHO MEAS - ESV(TEICH): 29.8 ML
BH CV ECHO MEAS - FS: 33.9 %
BH CV ECHO MEAS - IVS/LVPW: 1
BH CV ECHO MEAS - IVSD: 1 CM
BH CV ECHO MEAS - LA DIMENSION: 4 CM
BH CV ECHO MEAS - LA/AO: 1
BH CV ECHO MEAS - LAT PEAK E' VEL: 9.1 CM/SEC
BH CV ECHO MEAS - LV DIASTOLIC VOL/BSA (35-75): 66.4 ML/M^2
BH CV ECHO MEAS - LV MASS(C)D: 232 GRAMS
BH CV ECHO MEAS - LV MASS(C)DI: 121.3 GRAMS/M^2
BH CV ECHO MEAS - LV MAX PG: 1.6 MMHG
BH CV ECHO MEAS - LV MEAN PG: 1 MMHG
BH CV ECHO MEAS - LV SYSTOLIC VOL/BSA (12-30): 20.9 ML/M^2
BH CV ECHO MEAS - LV V1 MAX: 62.8 CM/SEC
BH CV ECHO MEAS - LV V1 MEAN: 45.3 CM/SEC
BH CV ECHO MEAS - LV V1 VTI: 17 CM
BH CV ECHO MEAS - LVIDD: 4.3 CM
BH CV ECHO MEAS - LVIDS: 2.8 CM
BH CV ECHO MEAS - LVLD AP4: 9 CM
BH CV ECHO MEAS - LVLS AP4: 7.5 CM
BH CV ECHO MEAS - LVOT AREA (M): 4.2 CM^2
BH CV ECHO MEAS - LVOT AREA: 4.2 CM^2
BH CV ECHO MEAS - LVOT DIAM: 2.3 CM
BH CV ECHO MEAS - LVPWD: 1 CM
BH CV ECHO MEAS - MED PEAK E' VEL: 8.1 CM/SEC
BH CV ECHO MEAS - MV A MAX VEL: 55.8 CM/SEC
BH CV ECHO MEAS - MV DEC SLOPE: 170 CM/SEC^2
BH CV ECHO MEAS - MV DEC TIME: 0.36 SEC
BH CV ECHO MEAS - MV E MAX VEL: 61.6 CM/SEC
BH CV ECHO MEAS - MV E/A: 1.1
BH CV ECHO MEAS - RAP SYSTOLE: 10 MMHG
BH CV ECHO MEAS - RVSP: 32.8 MMHG
BH CV ECHO MEAS - SI(AO): 236 ML/M^2
BH CV ECHO MEAS - SI(CUBED): 28.5 ML/M^2
BH CV ECHO MEAS - SI(LVOT): 36.9 ML/M^2
BH CV ECHO MEAS - SI(MOD-SP4): 45.5 ML/M^2
BH CV ECHO MEAS - SI(TEICH): 26.7 ML/M^2
BH CV ECHO MEAS - SV(AO): 451.6 ML
BH CV ECHO MEAS - SV(CUBED): 54.6 ML
BH CV ECHO MEAS - SV(LVOT): 70.6 ML
BH CV ECHO MEAS - SV(MOD-SP4): 87 ML
BH CV ECHO MEAS - SV(TEICH): 51 ML
BH CV ECHO MEAS - TR MAX VEL: 239 CM/SEC
BH CV ECHO MEASUREMENTS AVERAGE E/E' RATIO: 7.16
LEFT ATRIUM VOLUME INDEX: 26.3 ML/M2
LEFT ATRIUM VOLUME: 50.2 CM3
MAXIMAL PREDICTED HEART RATE: 148 BPM
STRESS TARGET HR: 126 BPM

## 2022-02-25 ENCOUNTER — OFFICE VISIT (OUTPATIENT)
Dept: CARDIOLOGY | Facility: CLINIC | Age: 72
End: 2022-02-25

## 2022-02-25 VITALS
HEART RATE: 57 BPM | BODY MASS INDEX: 25.74 KG/M2 | DIASTOLIC BLOOD PRESSURE: 68 MMHG | WEIGHT: 164 LBS | HEIGHT: 67 IN | SYSTOLIC BLOOD PRESSURE: 120 MMHG | OXYGEN SATURATION: 99 %

## 2022-02-25 DIAGNOSIS — I47.29 NSVT (NONSUSTAINED VENTRICULAR TACHYCARDIA): Primary | ICD-10-CM

## 2022-02-25 DIAGNOSIS — I42.8 NON-ISCHEMIC CARDIOMYOPATHY: Chronic | ICD-10-CM

## 2022-02-25 DIAGNOSIS — I48.0 PAF (PAROXYSMAL ATRIAL FIBRILLATION): Chronic | ICD-10-CM

## 2022-02-25 DIAGNOSIS — I47.1 PAROXYSMAL SVT (SUPRAVENTRICULAR TACHYCARDIA): ICD-10-CM

## 2022-02-25 DIAGNOSIS — R06.09 DOE (DYSPNEA ON EXERTION): ICD-10-CM

## 2022-02-25 PROCEDURE — 99214 OFFICE O/P EST MOD 30 MIN: CPT | Performed by: INTERNAL MEDICINE

## 2022-02-25 NOTE — PROGRESS NOTES
David Schulz  [id number]  1950  71 y.o.  male        Referring Provider: Ingrid Vergara MD    Reason for  Visit:    Here for routine follow up   Prior visit patient called to be seen due to new symptoms of higher heart rates  Initial visit for virtual visit using above modality   Echo in [place] LVEF 42%   Low risk stress test   Saw Dr Emmanuel in past   ECG today marked sinus bradycardia     Cardiac workup test results as below   Coronary CT angiography report as below:non obstructive coronary artery disease     Last echo LVEF 70%  Cardiac workup test results as below: echo, outpatient cardiac telemetry and CT head     Subjective    Overall feels better   No new events or complaints since last visit   Overall the patient feels no major change from baseline symptoms   Similar symptoms as during last visit     Has higher higher heart rates now better   Associated dizziness and weakness   Lasts for for mins   Says smart watch heart rates goes up to 150 BPM, mostly around 130 BPM   Usually at night   Occurs both day and night   Smart watch does not have rhythm strip    Mild chronic exertional shortness of breath on exertion relieved with rest  No significant cough or wheezing  No associated chest pain  No significant pedal edema    No fever or chills  No significant expectoration    No hemoptysis  No presyncope or syncope    Tolerating current medications well with no untoward side effects   Compliant with prescribed medication regimen. Tries to adhere to cardiac diet.     Walks some but now gets light headed and more shortness of breath   Easy fatiguability   Feels tired     on anticoagulation  No bleeding, excessive bruising, gait instability or fall risks    No new events or complaints since last visit     Dull occipital headache in past now better   BP well controlled at home.       History of present illness:  David Schulz is a 71 y.o. yo male with paroxysmal atrial fibrillation  who presents  today for   Chief Complaint   Patient presents with   • nsvt     RESULTS- ECHO AND HOLTER    .    History  Past Medical History:   Diagnosis Date   • Acute retention of urine    • Atrial fibrillation (HCC)    • BPH (benign prostatic hyperplasia)    • Hyperthyroidism    • Microscopic hematuria    • Peyronie's disease    ,   Past Surgical History:   Procedure Laterality Date   • CARPAL TUNNEL RELEASE     • CLAVICLE SURGERY     • THYROIDECTOMY     • THYROIDECTOMY     • TOTAL HIP ARTHROPLASTY     • VASECTOMY     ,   Family History   Problem Relation Age of Onset   • Heart disease Father    • Heart attack Father    • No Known Problems Mother    • Heart attack Brother    • Heart disease Brother    • Heart disease Paternal Grandfather    • Heart attack Paternal Grandfather    ,   Social History     Tobacco Use   • Smoking status: Former Smoker   • Smokeless tobacco: Never Used   Vaping Use   • Vaping Use: Never used   Substance Use Topics   • Alcohol use: Yes   • Drug use: Never   ,     Medications  Current Outpatient Medications   Medication Sig Dispense Refill   • apixaban (ELIQUIS) 5 MG tablet tablet Take 1 tablet by mouth Every 12 (Twelve) Hours. 60 tablet 11   • gabapentin (NEURONTIN) 300 MG capsule Take 300 mg by mouth 1 (One) Time.     • L-Lysine 500 MG tablet tablet      • levothyroxine (SYNTHROID, LEVOTHROID) 137 MCG tablet Take 137 mcg by mouth Daily.     • lisinopril (PRINIVIL,ZESTRIL) 5 MG tablet TAKE 1 TABLET BY MOUTH EVERY DAY 90 tablet 3   • multivitamin with minerals (MULTIVITAMIN ADULT PO) Take 1 tablet by mouth Daily.     • QUEtiapine (SEROquel) 50 MG tablet 1 TABLET(S) BY MOUTH NIGHTLY  3   • tamsulosin (FLOMAX) 0.4 MG capsule 24 hr capsule Take 1 capsule by mouth every night at bedtime. 90 capsule 3   • Chlorpheniramine-DM (CORICIDIN COUGH/COLD) 4-30 MG tablet Take 1 tablet by mouth 2 (Two) Times a Day As Needed (congestion and cough). 12 each 0   • levothyroxine (SYNTHROID, LEVOTHROID) 125 MCG tablet    "     No current facility-administered medications for this visit.       Allergies:  Morphine, Morphine and related, and Carvedilol    Review of Systems  Review of Systems   Constitutional: Positive for malaise/fatigue.   Eyes: Negative.    Cardiovascular: Positive for dyspnea on exertion and palpitations. Negative for chest pain, claudication, cyanosis, irregular heartbeat, leg swelling, near-syncope, orthopnea, paroxysmal nocturnal dyspnea and syncope.   Respiratory: Negative.    Endocrine: Negative.    Hematologic/Lymphatic: Negative.    Skin: Negative.    Musculoskeletal: Positive for arthritis.   Gastrointestinal: Negative for anorexia.   Genitourinary: Negative.    Neurological: Positive for dizziness, headaches and light-headedness.   Psychiatric/Behavioral: Negative.        Objective     Physical Exam:  /68   Pulse 57   Ht 170.2 cm (67\")   Wt 74.4 kg (164 lb)   SpO2 99%   BMI 25.69 kg/m²      Physical Exam   Constitutional: He appears well-developed.   HENT:   Head: Normocephalic.   Neck: Normal carotid pulses and no JVD present. No tracheal tenderness present. Carotid bruit is not present. No tracheal deviation present.   Cardiovascular: Regular rhythm, normal heart sounds and normal pulses.   Pulmonary/Chest: Effort normal. No stridor.   Abdominal: Soft. He exhibits no distension. There is no abdominal tenderness.   Neurological: He is alert. No cranial nerve deficit or sensory deficit.   Skin: Skin is warm.   Psychiatric: His speech is normal and behavior is normal.          Results Review:    EXAM: CT OF THE HEAD WITHOUT IV CONTRAST 11/26/2021     COMPARISON: None      INDICATION: Male, 71 years-old. Headache for a month      PROCEDURE: Non contrast enhanced head CT was performed.      Radiation dose equals  mGy-cm.  Automated exposure control dose  reduction technique was implemented.     FINDINGS:   Ventricles and cerebrospinal fluid spaces are normal in size and  configuration for the " patient's age. There is no evidence of mass-effect  or midline shift. The gray-white differentiation is preserved.  There is  no evidence of intracranial contusion, hemorrhage, or skull fracture.   The visualized portions of the mastoid air cells are clear. The  visualized portions of the paranasal sinuses are free of obstructive  mucosal disease.     IMPRESSION:  1.   No acute intracranial abnormality.  This report was finalized on 2021 09:17 by Dr. Laquita Ortiz MD.  David Schulz  Holter Monitor Greater than 7 days up to 15 days  Order# [id number]  Reading physician: Jose Manuel Akhtar MD Ordering physician: Jose Manuel Akhtar MD Study date: 21       Patient Information    Patient Name   David Schulz MRN   [id number] Legal Sex   Male  (Age)   1950 (71 y.o.)     Interpretation Summary       · Average HR: 60. Min HR: 40. Max HR: 200.     · Entire report was reviewed.  Monitoring in days: ~14   · The predominant rhythm noted during the testing period was sinus rhythm.     · Occasional supraventricular ectopics with an APC burden of: 2.3 %    · Rare premature ventricular contractions with a PVC burden of: < 1%     · No correlated arrhythmia  · No significant pauses                  Conclusion:      Baseline rhythm is sinus.  Slowest rate of 40 bpm at 10:48 PM consists of marked sinus bradycardia possibly during sleep  No significant ventricular ectopy  308 runs of supraventricular tachycardia longest 16 seconds at a maximum rate of 190 bpm  Occasional premature atrial contractions with a burden of 2.3%  Single 7-second run of nonsustained ventricular tachycardia at a maximum rate of 200 bpm at 5:28 PM  Intermittent wide-complex tachycardia starting with a long short cycle with a PAC which could be SVT with aberrancy         David Schulz   Holter Monitor - 24 Hour - Record/Scan Analysis With Report/Interp ([id number]) Clinic   Order# [id number]   Reading physician: Jose Manuel Akhtar MD Ordering physician:  Jose Manuel Akhtar MD Study date: 20   Patient Information     Patient Name  David Schulz MRN  [id number] Sex  Male  (Age)  1950 (70 y.o.)   Interpretation Summary        · Average HR: 56. Min HR: 41. Max HR: 187.     · Entire report was reviewed.  Monitoring in days: ~1   · The predominant rhythm noted during the testing period was sinus rhythm.     · Occasional supraventricular ectopics with an APC burden of: 1.7 %  · 7 runs of supraventricular tachycardia longest 12 beats at a maximum rate of 162 bpm and an average of 132 bpm         · Rare premature ventricular contractions with a PVC burden of: < 1%  · Single 4 beat run of nonsustained ventricular tachycardia at a maximum rate of 187 bpm and an average of 131 bpm     · No correlated arrhythmia  · No significant pauses           Conclusion: Baseline rhythm is sinus.  Occasional supraventricular ectopics with an APC burden of 1.7%  7 runs of supraventricular tachycardia longest 12 beats at a maximum rate of 162 bpm and an average rate of 132 bpm  Single 4 beat run of nonsustained ventricular tachycardia at a maximum rate of 187 bpm and an average of 131 bpm            Cardiac CT angiography 7/15/2020     Impression:      1. Total calcium score : 34.6  2. Mild focal calcification of the left anterior descending coronary artery without any high-grade stenotic lesions noted in any of the epicardial coronary arteries.  3.  Slight myocardial bridging noted of the mid left anterior descending coronary artery       David Schulz   Holter Monitor 72Hr-21Day (0296T/0298T)   Order# [id number]   Reading physician: Jose Manuel Akhtar MD Ordering physician: Jose Manuel Akhtar MD Study date: 20   Patient Information     Patient Name  David Schulz MRN  [id number] Sex  Male  (Age)  1950 (70 y.o.)   Interpretation Summary        · Average HR: 54. Min HR: 41. Max HR: 179.     · Entire report was reviewed.  Monitoring in days: ~13   · The predominant rhythm  noted during the testing period was sinus rhythm.     · Rare supraventricular ectopics with an APC burden of: < 1%  · 65 runs of supraventricular tachycardia longest 11.4 seconds at a maximum rate of 179 bpm and an average rate of 138 bpm         · Rare premature ventricular contractions with a PVC burden of: < 1%  · 2 runs of ventricular tachycardia longest 9 beats at a maximum rate of 162 bpm and an average rate of 143 bpm     · No correlated arrhythmia  · No significant pauses           Conclusion: Baseline rhythm is sinus.  Slowest heart rate of 41 bpm consisting of sinus bradycardia at 12:53 PM  65 runs of supraventricular tachycardia longest 11.4 seconds at a maximum rate of 179 bpm and an average rate of 138 bpm  2 runs of nonsustained ventricular tachycardia longest 9 beats at a maximum rate of 162 bpm and an average rate of 143 bpm            Results for orders placed during the hospital encounter of 01/10/22    Adult Transthoracic Echo Complete w/ Color, Spectral and Contrast if necessary per protocol    Interpretation Summary  · Left ventricular ejection fraction appears to be 56 - 60%. Left ventricular systolic function is normal.  · Left ventricular diastolic function was normal.  · Abnormal global longitudinal LV strain (GLS) = -14.0%.  · Mild aortic valve regurgitation is present.  · Estimated right ventricular systolic pressure from tricuspid regurgitation is normal (<35 mmHg).     Study Location: Echo Lab  Technical Quality: Adequate visualization    Patient Status: Inpatient    Rhythm: Atrial fibrillation     Conclusions     Summary   Mitral valve leaflets are mildly thickened with preserved leaflet   mobility.   Trace mitral regurgitation.   Mildly thickened aortic valve leaflets with preserved leaflet mobility.   Aortic valve appears to be tricuspid.   No evidence of aortic stenosis; trivial aortic regurgitation.   Trivial tricuspid regurgitation with estimated RVSP of 19 mm Hg.   Mildly dilated  left atrium.   Left ventricular size is normal .   Mild concentric left ventricular hypertrophy.   mildly decreased ejection fraction.   Unable to obtain EF due to rhythm.   Left ventricular ejection fraction is estimated at 42%.     Signature     ----------------------------------------------------------------   Electronically signed by Robin Hitchcock MD(Interpreting   physician) on 09/01/2019 05:24 PM   ----------------------------------------------------------------     Procedures____________________________________________________________________________________________________________________________________________  Health maintenance and recommendations    Low salt/ HTN/ Heart healthy carbohydrate restricted cardiac diet (printed dietary and general instructions provided for home review )  The patient is advised to reduce or avoid caffeine or other cardiac stimulants.     The patient was advised to avoid long term NSAIDS , use Tylenol PRN instead  Avoid cardiac stimulants including common drugs like Pseudoephedrine or excessive amounts of caffeine  Monitor for any signs of bleeding including red or dark stools. Fall precautions.   Advised staying uptodate with immunizations per established standard guidelines.  Offered to give patient  a copy      Questions were encouraged, asked and answered to the patient's  understanding and satisfaction. Questions if any regarding current medications and side effects, need for refills and importance of compliance to medications stressed.    Reviewed available prior notes, consults, prior visits, laboratory findings, radiology and cardiology relevant reports. Updated chart as applicable. I have reviewed the patient's medical history in detail and updated the computerized patient record as relevant.      Updated patient regarding any new or relevant abnormalities on review of records or any new findings on physical exam. Mentioned to patient about purpose of visit and  desirable health short and long term goals and objectives.    Primary to monitor CBC CMP Lipid panel and TSH as applicable    ___________________________________________________________________________________________________________________________________________          Assessment/Plan   Diagnoses and all orders for this visit:    1. NSVT (nonsustained ventricular tachycardia) ([facility]) (Primary)  -     Stress Test With Myocardial Perfusion (1 Day); Future    2. Non-ischemic cardiomyopathy ([facility])    3. VALENTINE (dyspnea on exertion)  -     Stress Test With Myocardial Perfusion (1 Day); Future    4. PAF (paroxysmal atrial fibrillation) ([facility])    5. Paroxysmal SVT (supraventricular tachycardia) ([facility])           Plan    Patient expressed understanding  Encouraged and answered all questions   Discussed with the patient and all questioned fully answered. He will call me if any problems arise.   Discussed results of prior testing with patient : echo and outpatient cardiac telemetry   Also discussed prior coronary CT angiography with no obstructive coronary artery disease        Orders Placed This Encounter   Procedures   • Stress Test With Myocardial Perfusion (1 Day)     Standing Status:   Future     Standing Expiration Date:   2/25/2023     Order Specific Question:   What stress agent will be used?     Answer:   Regadenoson (Lexiscan)     Order Specific Question:   Difficulty walking criteria?     Answer:   Musculoskeletal (hips, knees, feet, back, amputee)     Order Specific Question:   Reason for exam?     Answer:   Arrhythmia     Order Specific Question:   Release to patient     Answer:   Immediate      Call for results of above testing.      If has significant tachy sridevi syndrome will need a pacer in future   No indication for pacer now     Check BP and heart rates twice daily at least 3x / week, week a month  at home and bring a recording for me to review next visit  If BP >130/85 or < 100/60 persistently over 3 reading  30 mins apart call sooner      I support the patient's decision to take the Covid -19 vaccine   Had required complete course   No major issues   Now fully immunized    Had booster too      Monitor for any signs of bleeding including red or dark stools as well as easy bruisabilty. Fall precautions.     Follow up with Becka GRADY , Miguel Ángel GRADY  or myself             Return in about 6 months (around 8/25/2022).

## 2022-03-02 ENCOUNTER — OFFICE VISIT (OUTPATIENT)
Dept: UROLOGY | Age: 72
End: 2022-03-02
Payer: MEDICARE

## 2022-03-02 VITALS
SYSTOLIC BLOOD PRESSURE: 132 MMHG | WEIGHT: 165.8 LBS | TEMPERATURE: 97.7 F | DIASTOLIC BLOOD PRESSURE: 61 MMHG | HEIGHT: 69 IN | BODY MASS INDEX: 24.56 KG/M2 | HEART RATE: 85 BPM

## 2022-03-02 DIAGNOSIS — R39.12 BENIGN PROSTATIC HYPERPLASIA WITH WEAK URINARY STREAM: Primary | ICD-10-CM

## 2022-03-02 DIAGNOSIS — N40.1 BENIGN PROSTATIC HYPERPLASIA WITH WEAK URINARY STREAM: Primary | ICD-10-CM

## 2022-03-02 LAB
APPEARANCE FLUID: CLEAR
BILIRUBIN, POC: NORMAL
BLOOD URINE, POC: NORMAL
CLARITY, POC: CLEAR
COLOR, POC: YELLOW
GLUCOSE URINE, POC: NORMAL
KETONES, POC: NORMAL
LEUKOCYTE EST, POC: NORMAL
NITRITE, POC: NORMAL
PH, POC: 6.5
PROTEIN, POC: NORMAL
SPECIFIC GRAVITY, POC: 1.01
UROBILINOGEN, POC: 0.2

## 2022-03-02 PROCEDURE — 3017F COLORECTAL CA SCREEN DOC REV: CPT | Performed by: UROLOGY

## 2022-03-02 PROCEDURE — 81002 URINALYSIS NONAUTO W/O SCOPE: CPT | Performed by: UROLOGY

## 2022-03-02 PROCEDURE — G8420 CALC BMI NORM PARAMETERS: HCPCS | Performed by: UROLOGY

## 2022-03-02 PROCEDURE — 4040F PNEUMOC VAC/ADMIN/RCVD: CPT | Performed by: UROLOGY

## 2022-03-02 PROCEDURE — 1123F ACP DISCUSS/DSCN MKR DOCD: CPT | Performed by: UROLOGY

## 2022-03-02 PROCEDURE — 51798 US URINE CAPACITY MEASURE: CPT | Performed by: UROLOGY

## 2022-03-02 PROCEDURE — G8427 DOCREV CUR MEDS BY ELIG CLIN: HCPCS | Performed by: UROLOGY

## 2022-03-02 PROCEDURE — G8484 FLU IMMUNIZE NO ADMIN: HCPCS | Performed by: UROLOGY

## 2022-03-02 PROCEDURE — 99213 OFFICE O/P EST LOW 20 MIN: CPT | Performed by: UROLOGY

## 2022-03-02 PROCEDURE — 1036F TOBACCO NON-USER: CPT | Performed by: UROLOGY

## 2022-03-02 ASSESSMENT — ENCOUNTER SYMPTOMS
EYE DISCHARGE: 0
WHEEZING: 0
BACK PAIN: 0
FACIAL SWELLING: 0
NAUSEA: 0
CHEST TIGHTNESS: 0
SORE THROAT: 0
VOMITING: 0
EYE REDNESS: 0

## 2022-03-02 NOTE — PROGRESS NOTES
Surjit Marquez is a 66 y.o. male who presents today   Chief Complaint   Patient presents with    Follow-up     I am here today for a 3 month FU. BPH / LUTS:  Patient is here today for lower urinary tract symptoms which started  year(s) ago. Recently the urinary symptoms are: are improving after changing tamsulosin from 0.4 to 0.8 mg  Current medical Rx for BPH/LUTS: Tamsulosin 0.8 mg daily  Previous treatments tried for LUTS: none  Previous surgical intervention: none  Urinary retention: No  Any associated gross hematuria? no  History of recurrent UTIs: no  His AUA Symptom Score is, 12/35   Patient states symptoms are of moderate severity. He subjectively overall thinks his symptoms are improved his AUA symptom score previously was 17.   His residual stability was 15    Past Medical History:   Diagnosis Date    Bipolar disorder ([address]     Colon polyps     Hypothyroidism     Insomnia        Past Surgical History:   Procedure Laterality Date    CARPAL TUNNEL RELEASE Right     approx 5 years ago    CLAVICLE SURGERY      x 2    COLONOSCOPY  ? 5-6 years ago        COLONOSCOPY N/A 12/19/2014    Monica:  normal,   5y recall    COLONOSCOPY N/A 05/05/2021    Dr Swathi Su, Benign Serrated Polyps (SSA -dysplasia, HP), Moderate diverticulosis, No clear-cut lesions to explain proctalgia/perianal itching, 1 year recall    FOOT SURGERY      x 2    HERNIA REPAIR      JOINT REPLACEMENT Right     hip    THYROIDECTOMY      VARICOSE VEIN SURGERY      VASECTOMY         Current Outpatient Medications   Medication Sig Dispense Refill    GABAPENTIN PO Take 500 mg by mouth daily      tamsulosin (FLOMAX) 0.4 MG capsule Take 2 capsules by mouth nightly 180 capsule 3    Vitamins/Minerals TABS Take 1 tablet by mouth daily      lisinopril (PRINIVIL;ZESTRIL) 5 MG tablet Take 5 mg by mouth daily      ELIQUIS 5 MG TABS tablet TAKE 1 TABLET BY MOUTH TWICE A DAY 60 tablet 5    QUEtiapine (SEROQUEL) 50 MG Not on file    Attends Congregation Services: Not on file    Active Member of Clubs or Organizations: Not on file    Attends Club or Organization Meetings: Not on file    Marital Status: Not on file   Intimate Partner Violence:     Fear of Current or Ex-Partner: Not on file    Emotionally Abused: Not on file    Physically Abused: Not on file    Sexually Abused: Not on file   Housing Stability:     Unable to Pay for Housing in the Last Year: Not on file    Number of [place] in the Last Year: Not on file    Unstable Housing in the Last Year: Not on file       Family History   Problem Relation Age of Onset    Dementia Mother     Obesity Mother     Stroke Father     Heart Attack Father     Other Sister     Other Brother         reheumatic fever- valve issue    Colon Cancer Neg Hx     Colon Polyps Neg Hx     Esophageal Cancer Neg Hx     Liver Cancer Neg Hx     Rectal Cancer Neg Hx     Stomach Cancer Neg Hx        REVIEW OF SYSTEMS:  Review of Systems   Constitutional: Negative for chills and fever. HENT: Negative for facial swelling and sore throat. Eyes: Negative for discharge and redness. Respiratory: Negative for chest tightness and wheezing. Cardiovascular: Negative for chest pain and palpitations. Gastrointestinal: Negative for nausea and vomiting. Endocrine: Negative for polyphagia and polyuria. Genitourinary: Negative for decreased urine volume, difficulty urinating, dysuria, enuresis, flank pain, frequency, genital sores, hematuria, penile discharge, penile pain, penile swelling, scrotal swelling, testicular pain and urgency. Musculoskeletal: Negative for back pain and neck stiffness. Skin: Negative for rash and wound. Neurological: Negative for dizziness and headaches. Hematological: Negative for adenopathy. Does not bruise/bleed easily. Psychiatric/Behavioral: Negative for confusion and hallucinations.          PHYSICAL EXAM:  /61   Pulse 85   Temp 97.7 °F (36.5 °C)   Ht 5' 9\" (1.753 m)   Wt 165 lb 12.8 oz (75.2 kg)   BMI 24.48 kg/m²   Physical Exam  Constitutional:       General: He is not in acute distress. Appearance: Normal appearance. He is well-developed. HENT:      Head: Normocephalic and atraumatic. Nose: Nose normal.   Eyes:      General: No scleral icterus. Conjunctiva/sclera: Conjunctivae normal.      Pupils: Pupils are equal, round, and reactive to light. Neck:      Trachea: No tracheal deviation. Cardiovascular:      Rate and Rhythm: Normal rate and regular rhythm. Pulmonary:      Effort: Pulmonary effort is normal. No respiratory distress. Breath sounds: No stridor. Abdominal:      General: There is no distension. Palpations: Abdomen is soft. There is no mass. Tenderness: There is no abdominal tenderness. Musculoskeletal:         General: No tenderness. Normal range of motion. Cervical back: Normal range of motion and neck supple. Lymphadenopathy:      Cervical: No cervical adenopathy. Skin:     General: Skin is warm and dry. Findings: No erythema. Neurological:      Mental Status: He is alert and oriented to person, place, and time. Psychiatric:         Behavior: Behavior normal.         Judgment: Judgment normal.             DATA:    Results for orders placed or performed in visit on 22   POCT Urinalysis no Micro   Result Value Ref Range    Color, UA yellow     Clarity, UA clear     Glucose, UA POC neg     Bilirubin, UA neg     Ketones, UA neg     Spec Grav, UA 1.015     Blood, UA POC trace     pH, UA 6.5     Protein, UA POC neg     Urobilinogen, UA 0.2     Leukocytes, UA neg     Nitrite, UA neg     Appearance, Fluid Clear Clear, Slightly Cloudy     Lab Results   Component Value Date    PSA 0.26 2020     No results found for: PSAFREEPCT          IMAGIN.  Benign prostatic hyperplasia with weak urinary stream  He subjectively feels like he is improved on higher dose of tamsulosin. He will be due CASSIDY and PSA in 6 months. - [place] Measure, post-void residual, US, non-imaging  - POCT Urinalysis no Micro  - PSA, Diagnostic; Future      Orders Placed This Encounter   Procedures    PSA, Diagnostic     PSA in 6 month     Standing Status:   Future     Standing Expiration Date:   3/2/2023    POCT Urinalysis no Micro    [place] Measure, post-void residual, US, non-imaging     15ml        Return in about 6 months (around 9/2/2022). All information inputted into the note by the MA to include chief complaint, past medical history, past surgical history, medications, allergies, social and family history and review of systems has been reviewed and updated as needed by me. EMR Dragon/transcription disclaimer: Much of this documentt is electronic  transcription/translation of spoken language to printed text. The  electronic translation of spoken language may be erroneous, or at times,  nonsensical words or phrases may be inadvertently transcribed.  Although I  have reviewed the document for such errors, some may still exist.

## 2022-03-14 ENCOUNTER — HOSPITAL ENCOUNTER (OUTPATIENT)
Dept: CARDIOLOGY | Facility: HOSPITAL | Age: 72
Discharge: HOME | End: 2022-03-14

## 2022-03-14 VITALS
SYSTOLIC BLOOD PRESSURE: 141 MMHG | HEART RATE: 50 BPM | BODY MASS INDEX: 24.8 KG/M2 | DIASTOLIC BLOOD PRESSURE: 64 MMHG | HEIGHT: 67 IN | WEIGHT: 158 LBS

## 2022-03-14 DIAGNOSIS — R06.09 DOE (DYSPNEA ON EXERTION): ICD-10-CM

## 2022-03-14 DIAGNOSIS — I47.29 NSVT (NONSUSTAINED VENTRICULAR TACHYCARDIA): ICD-10-CM

## 2022-03-14 LAB
BH CV REST NUCLEAR ISOTOPE DOSE: 11.6 MCI
BH CV STRESS BP STAGE 1: NORMAL
BH CV STRESS COMMENTS STAGE 1: NORMAL
BH CV STRESS DOSE REGADENOSON STAGE 1: 0.4
BH CV STRESS DURATION MIN STAGE 1: 0
BH CV STRESS DURATION SEC STAGE 1: 10
BH CV STRESS HR STAGE 1: 71
BH CV STRESS NUCLEAR ISOTOPE DOSE: 34.8 MCI
BH CV STRESS PROTOCOL 1: NORMAL
BH CV STRESS RECOVERY BP: NORMAL MMHG
BH CV STRESS RECOVERY HR: 68 BPM
BH CV STRESS STAGE 1: 1
LV EF NUC BP: 62 %
MAXIMAL PREDICTED HEART RATE: 148 BPM
PERCENT MAX PREDICTED HR: 47.97 %
STRESS BASELINE BP: NORMAL MMHG
STRESS BASELINE HR: 50 BPM
STRESS PERCENT HR: 56 %
STRESS POST EXERCISE DUR SEC: 10 SEC
STRESS POST PEAK BP: NORMAL MMHG
STRESS POST PEAK HR: 71 BPM
STRESS TARGET HR: 126 BPM

## 2022-03-14 PROCEDURE — A9500 TC99M SESTAMIBI: HCPCS | Performed by: INTERNAL MEDICINE

## 2022-03-14 PROCEDURE — 78452 HT MUSCLE IMAGE SPECT MULT: CPT | Performed by: INTERNAL MEDICINE

## 2022-03-14 PROCEDURE — 93016 CV STRESS TEST SUPVJ ONLY: CPT | Performed by: INTERNAL MEDICINE

## 2022-03-14 PROCEDURE — 0 TECHNETIUM SESTAMIBI: Performed by: INTERNAL MEDICINE

## 2022-03-14 PROCEDURE — 25010000002 REGADENOSON 0.4 MG/5ML SOLUTION: Performed by: INTERNAL MEDICINE

## 2022-03-14 PROCEDURE — 93017 CV STRESS TEST TRACING ONLY: CPT

## 2022-03-14 PROCEDURE — 78452 HT MUSCLE IMAGE SPECT MULT: CPT

## 2022-03-14 PROCEDURE — 93018 CV STRESS TEST I&R ONLY: CPT | Performed by: INTERNAL MEDICINE

## 2022-03-14 RX ADMIN — TECHNETIUM TC 99M SESTAMIBI 1 DOSE: 1 INJECTION INTRAVENOUS at 08:55

## 2022-03-14 RX ADMIN — TECHNETIUM TC 99M SESTAMIBI 1 DOSE: 1 INJECTION INTRAVENOUS at 07:35

## 2022-03-14 RX ADMIN — REGADENOSON 0.4 MG: 0.08 INJECTION, SOLUTION INTRAVENOUS at 08:44

## 2022-03-23 ENCOUNTER — TELEPHONE (OUTPATIENT)
Dept: CARDIOLOGY | Facility: CLINIC | Age: 72
End: 2022-03-23

## 2022-03-23 NOTE — TELEPHONE ENCOUNTER
Patient is needing cardiac clearance for a colonoscopy with Dr Perez, at [facility].\      LOV: 2/25/22  Recent testing: 3/14/22  Hx: NSVT    Patient is currently taking Eliquis 5mg      Please advise

## 2022-03-25 ENCOUNTER — TELEPHONE (OUTPATIENT)
Dept: GASTROENTEROLOGY | Age: 72
End: 2022-03-25

## 2022-04-14 DIAGNOSIS — R39.12 BENIGN PROSTATIC HYPERPLASIA WITH WEAK URINARY STREAM: ICD-10-CM

## 2022-04-14 DIAGNOSIS — N40.1 BENIGN PROSTATIC HYPERPLASIA WITH WEAK URINARY STREAM: ICD-10-CM

## 2022-04-14 RX ORDER — TAMSULOSIN HYDROCHLORIDE 0.4 MG/1
CAPSULE ORAL
Qty: 90 CAPSULE | Refills: 3 | OUTPATIENT
Start: 2022-04-14

## 2022-04-15 RX ORDER — LISINOPRIL 5 MG/1
TABLET ORAL
Qty: 90 TABLET | Refills: 3 | Status: SHIPPED | OUTPATIENT
Start: 2022-04-15 | End: 2022-08-25 | Stop reason: SDUPTHER

## 2022-04-15 RX ORDER — APIXABAN 5 MG/1
TABLET, FILM COATED ORAL
Qty: 60 TABLET | Refills: 11 | Status: SHIPPED | OUTPATIENT
Start: 2022-04-15 | End: 2023-03-10

## 2022-05-10 ENCOUNTER — ANESTHESIA (OUTPATIENT)
Dept: OPERATING ROOM | Age: 72
End: 2022-05-10

## 2022-05-10 ENCOUNTER — APPOINTMENT (OUTPATIENT)
Dept: OPERATING ROOM | Age: 72
End: 2022-05-10

## 2022-05-10 ENCOUNTER — ANESTHESIA EVENT (OUTPATIENT)
Dept: OPERATING ROOM | Age: 72
End: 2022-05-10

## 2022-05-10 ENCOUNTER — HOSPITAL ENCOUNTER (OUTPATIENT)
Age: 72
Setting detail: SPECIMEN
Discharge: HOME | End: 2022-05-10
Payer: MEDICARE

## 2022-05-10 ENCOUNTER — HOSPITAL ENCOUNTER (OUTPATIENT)
Age: 72
Setting detail: OUTPATIENT SURGERY
Discharge: HOME | End: 2022-05-10
Attending: INTERNAL MEDICINE | Admitting: INTERNAL MEDICINE
Payer: MEDICARE

## 2022-05-10 VITALS
OXYGEN SATURATION: 99 % | HEIGHT: 68 IN | BODY MASS INDEX: 23.95 KG/M2 | WEIGHT: 158 LBS | DIASTOLIC BLOOD PRESSURE: 57 MMHG | HEART RATE: 52 BPM | TEMPERATURE: 97.4 F | RESPIRATION RATE: 18 BRPM | SYSTOLIC BLOOD PRESSURE: 100 MMHG

## 2022-05-10 VITALS
OXYGEN SATURATION: 98 % | SYSTOLIC BLOOD PRESSURE: 123 MMHG | DIASTOLIC BLOOD PRESSURE: 67 MMHG | RESPIRATION RATE: 16 BRPM

## 2022-05-10 PROCEDURE — 88305 TISSUE EXAM BY PATHOLOGIST: CPT

## 2022-05-10 PROCEDURE — 45385 COLONOSCOPY W/LESION REMOVAL: CPT

## 2022-05-10 PROCEDURE — 45385 COLONOSCOPY W/LESION REMOVAL: CPT | Performed by: INTERNAL MEDICINE

## 2022-05-10 RX ORDER — DIPHENHYDRAMINE HYDROCHLORIDE 50 MG/ML
12.5 INJECTION INTRAMUSCULAR; INTRAVENOUS
Status: CANCELLED | OUTPATIENT
Start: 2022-05-10 | End: 2022-05-10

## 2022-05-10 RX ORDER — SODIUM CHLORIDE 9 MG/ML
INJECTION, SOLUTION INTRAVENOUS PRN
Status: CANCELLED | OUTPATIENT
Start: 2022-05-10

## 2022-05-10 RX ORDER — ONDANSETRON 2 MG/ML
4 INJECTION INTRAMUSCULAR; INTRAVENOUS
Status: CANCELLED | OUTPATIENT
Start: 2022-05-10 | End: 2022-05-10

## 2022-05-10 RX ORDER — SODIUM CHLORIDE 0.9 % (FLUSH) 0.9 %
5-40 SYRINGE (ML) INJECTION PRN
Status: CANCELLED | OUTPATIENT
Start: 2022-05-10

## 2022-05-10 RX ORDER — SODIUM CHLORIDE 9 MG/ML
INJECTION, SOLUTION INTRAVENOUS CONTINUOUS
Status: DISCONTINUED | OUTPATIENT
Start: 2022-05-10 | End: 2022-05-10 | Stop reason: HOSPADM

## 2022-05-10 RX ORDER — LIDOCAINE HYDROCHLORIDE 10 MG/ML
INJECTION, SOLUTION EPIDURAL; INFILTRATION; INTRACAUDAL; PERINEURAL PRN
Status: DISCONTINUED | OUTPATIENT
Start: 2022-05-10 | End: 2022-05-10 | Stop reason: SDUPTHER

## 2022-05-10 RX ORDER — SODIUM CHLORIDE 0.9 % (FLUSH) 0.9 %
5-40 SYRINGE (ML) INJECTION EVERY 12 HOURS SCHEDULED
Status: CANCELLED | OUTPATIENT
Start: 2022-05-10

## 2022-05-10 RX ORDER — PROPOFOL 10 MG/ML
INJECTION, EMULSION INTRAVENOUS PRN
Status: DISCONTINUED | OUTPATIENT
Start: 2022-05-10 | End: 2022-05-10 | Stop reason: SDUPTHER

## 2022-05-10 RX ADMIN — SODIUM CHLORIDE: 9 INJECTION, SOLUTION INTRAVENOUS at 08:19

## 2022-05-10 RX ADMIN — LIDOCAINE HYDROCHLORIDE 30 MG: 10 INJECTION, SOLUTION EPIDURAL; INFILTRATION; INTRACAUDAL; PERINEURAL at 08:58

## 2022-05-10 RX ADMIN — PROPOFOL 240 MG: 10 INJECTION, EMULSION INTRAVENOUS at 08:58

## 2022-05-10 NOTE — ANESTHESIA PRE PROCEDURE
Department of Anesthesiology  Preprocedure Note       Name:  Peyton Jung   Age:  66 y.o.  :  1950                                          MRN:  [id number]         Date:  5/10/2022      Surgeon: Suzi Ledbetter):  Jose Gray MD    Procedure: Procedure(s):  COLORECTAL CANCER SCREENING, NOT HIGH RISK    Medications prior to admission:   Prior to Admission medications    Medication Sig Start Date End Date Taking? Authorizing Provider   GABAPENTIN PO Take 500 mg by mouth daily    Historical Provider, MD   tamsulosin (FLOMAX) 0.4 MG capsule Take 2 capsules by mouth nightly 21   Reji More MD   Vitamins/Minerals TABS Take 1 tablet by mouth daily    Historical Provider, MD   lisinopril (PRINIVIL;ZESTRIL) 5 MG tablet Take 5 mg by mouth daily    Historical Provider, MD   ELIQUIS 5 MG TABS tablet TAKE 1 TABLET BY MOUTH TWICE A DAY 20   JOHNSON Pineda   QUEtiapine (SEROQUEL) 50 MG tablet Take 50 mg by mouth nightly. Historical Provider, MD   levothyroxine (SYNTHROID) 137 MCG tablet Take 137 mcg by mouth Daily     Historical Provider, MD       Current medications:    No current outpatient medications on file. No current facility-administered medications for this visit. Allergies:     Allergies   Allergen Reactions    Morphine Itching    Coreg [Carvedilol] Nausea And Vomiting       Problem List:    Patient Active Problem List   Diagnosis Code    History of colon polyps Z86.010    Hypothyroidism E03.9    Orthostasis I95.1    Bradycardia R00.1    Paroxysmal atrial fibrillation (HCC) I48.0    Change in bowel habits R19.4    Constipation K59.00    BRBPR (bright red blood per rectum) K62.5    Rectal pain K62.89    Rectal burning R20.8    Rectal itching L29.0    S/P colonoscopic polypectomy Z98.890       Past Medical History:        Diagnosis Date    Atrial fibrillation (HCC)     Bipolar disorder (HCC)     Colon polyps     Hypothyroidism     Insomnia        Past Surgical History:        Procedure Laterality Date    CARPAL TUNNEL RELEASE Right     approx 5 years ago    CLAVICLE SURGERY      x 2    COLONOSCOPY  ? 5-6 years ago        COLONOSCOPY N/A 12/19/2014    Monica:  normal,   5y recall    COLONOSCOPY N/A 05/05/2021    Dr Jonathan Goldstein, Benign Serrated Polyps (SSA -dysplasia, HP), Moderate diverticulosis, No clear-cut lesions to explain proctalgia/perianal itching, 1 year recall    FOOT SURGERY      x 2    HERNIA REPAIR      JOINT REPLACEMENT Right     hip    THYROIDECTOMY      VARICOSE VEIN SURGERY      VASECTOMY         Social History:    Social History     Tobacco Use    Smoking status: Never Smoker    Smokeless tobacco: Never Used   Substance Use Topics    Alcohol use: Yes     Alcohol/week: 3.0 standard drinks     Types: 3 Glasses of wine per week     Comment: weekly                                Counseling given: Not Answered      Vital Signs (Current): There were no vitals filed for this visit.                                            BP Readings from Last 3 Encounters:   05/10/22 (!) 145/80   03/02/22 132/61   06/11/21 102/64       NPO Status:                                                                                 BMI:   Wt Readings from Last 3 Encounters:   05/10/22 158 lb (71.7 kg)   03/02/22 165 lb 12.8 oz (75.2 kg)   11/01/21 169 lb 8 oz (76.9 kg)     There is no height or weight on file to calculate BMI.    CBC:   Lab Results   Component Value Date    WBC 3.8 06/06/2020    RBC 4.08 06/06/2020    HGB 13.5 06/06/2020    HGB 16.0 06/03/2011    HGB 15.0 01/14/2011    HCT 38.4 06/06/2020    HCT 43.6 01/14/2011    MCV 94.1 06/06/2020    RDW 12.8 06/06/2020     06/06/2020     01/14/2011       CMP:   Lab Results   Component Value Date     06/06/2020     06/03/2011    K 4.4 06/06/2020    K 4.1 06/03/2011     06/06/2020     06/03/2011    CO2 26 06/06/2020    BUN 11 06/06/2020    CREATININE 0.6 06/06/2020    CREATININE 0.9 06/03/2011    LABGLOM >60 06/06/2020    GLUCOSE 105 06/06/2020    PROT 6.3 06/06/2020    PROT 7.0 06/03/2011    CALCIUM 8.1 06/06/2020    BILITOT 0.9 06/06/2020    ALKPHOS 52 06/06/2020    ALKPHOS 54 06/03/2011    AST 14 06/06/2020    ALT 11 06/06/2020       POC Tests: No results for input(s): POCGLU, POCNA, POCK, POCCL, POCBUN, POCHEMO, POCHCT in the last 72 hours. Coags:   Lab Results   Component Value Date    PROTIME 14.9 06/06/2020    INR 1.17 06/06/2020    APTT 31.0 06/06/2020       HCG (If Applicable): No results found for: PREGTESTUR, PREGSERUM, HCG, HCGQUANT     ABGs: No results found for: PHART, PO2ART, XHV0TPZ, IRG1TAR, BEART, J7WAEMUD     Type & Screen (If Applicable):  No results found for: LABABO, LABRH    Drug/Infectious Status (If Applicable):  No results found for: HIV, HEPCAB    COVID-19 Screening (If Applicable):   Lab Results   Component Value Date    COVID19 Not Detected 05/03/2021           Anesthesia Evaluation  Patient summary reviewed  Airway: Mallampati: II  TM distance: >3 FB   Neck ROM: full  Mouth opening: < 3 FB Dental: normal exam         Pulmonary:normal exam    (+) asthma:                            Cardiovascular:  Exercise tolerance: poor (<4 METS),   (+) hypertension:, dysrhythmias: atrial fibrillation,       ECG reviewed               Beta Blocker:  Not on Beta Blocker      ROS comment: History of paroxysmal atrial fibrillation and bradycardia     Neuro/Psych:   (+) psychiatric history (Bipolar):            GI/Hepatic/Renal:   (+) bowel prep,          ROS comment: etoh use. Endo/Other:    (+) hypothyroidism, blood dyscrasia (Eliquis last dose 5 days ago): anticoagulation therapy:., .                 Abdominal:             Vascular: negative vascular ROS. Other Findings:             Anesthesia Plan      general and TIVA     ASA 3       Induction: intravenous. Anesthetic plan and risks discussed with patient.                       Elza Daugherty Josue Mckoy, JOHNSON - CRNA   5/10/2022

## 2022-05-10 NOTE — ANESTHESIA POSTPROCEDURE EVALUATION
Department of Anesthesiology  Postprocedure Note    Patient: Rufino Baumgarten  MRN: [id number]  YOB: 1950  Date of evaluation: 5/10/2022  Time:  9:24 AM     Procedure Summary     Date: 05/10/22 Room / Location: [facility] ENDO 02 / [address]    Anesthesia Start: 3567 Anesthesia Stop: 8462    Procedure: COLONOSCOPY POLYPECTOMY SNARE/COLD BIOPSY (N/A Abdomen) Diagnosis: (HX POLYPS)    Surgeons: Dayton Pratt MD Responsible Provider: JOHNSON Anderson CRNA    Anesthesia Type: general, TIVA ASA Status: 3          Anesthesia Type: No value filed. Chad Phase I:      Chad Phase II:      Last vitals: Reviewed and per EMR flowsheets.        Anesthesia Post Evaluation    Patient location during evaluation: bedside  Patient participation: complete - patient participated  Level of consciousness: awake  Pain score: 0  Airway patency: patent  Nausea & Vomiting: no nausea and no vomiting  Complications: no  Cardiovascular status: hemodynamically stable  Respiratory status: acceptable, room air and spontaneous ventilation  Hydration status: euvolemic

## 2022-05-10 NOTE — H&P
Patient Name: Corin Duarte  : 1950  MRN: [id number]  DATE: 05/10/22    Allergies: Allergies   Allergen Reactions    Morphine Itching    Coreg [Carvedilol] Nausea And Vomiting        ENDOSCOPY  History and Physical    Procedure:    [] Diagnostic Colonoscopy       [x] Screening Colonoscopy  [] EGD      [] ERCP      [] EUS       [] Other    [x] Previous office notes/History and Physical reviewed from the patients chart. Please see EMR for further details of HPI. I have examined the patient's status immediately prior to the procedure and:      Indications/HPI:   1. Constipation, unspecified constipation type     2. S/P colonoscopic polypectomy      3.  H/o of colon polyps-last colonoscopy on 2021 had revealed:  3 flat sessile polyps were found in the right colon as follows and were resected by hot snare polypectomy technique:  A 1.5 cm in diameter flat sessile polyp was removed in a piecemeal fashion from the proximal ascending colon. A second 1 cm in diameter flat polyp in the same area was removed separately by hot snare technique. A third 8 mm sessile flat polyp at the hepatic flexure likewise was subjected to hot snare polypectomy.     Otherwise, the mucosa appeared normal throughout the entire examined colon      NO larger polyps or masses or strictures or colitis or proctitis. Moderate diverticulosis in the left colon    4.  Strong family history of GI cancers including:, Gastric and esophageal      []Abdominal Pain   []Cancer- GI/Lung     []Fhx of colon CA/polyps  []History of Polyps  []Barretts            []Melena  []Abnormal Imaging              []Dysphagia              []Persistent Pneumonia   []Anemia                            []Food Impaction        []History of Polyps  [] GI Bleed             []Pulmonary nodule/Mass   []Change in bowel habits []Heartburn/Reflux  []Rectal Bleed (BRBPR)  []Chest Pain - Non Cardiac []Heme (+) Stool []Ulcers  []Constipation  []Hemoptysis  []Varices  []Diarrhea  []Hypoxemia    []Nausea/Vomiting   []Screening   []Crohns/Colitis  []Other:     Anesthesia:   [x] MAC [] Moderate Sedation   [] General   [] None     ROS: 12 pt Review of Symptoms was negative unless mentioned above    Medications:   Prior to Admission medications    Medication Sig Start Date End Date Taking? Authorizing Provider   GABAPENTIN PO Take 500 mg by mouth daily    Historical Provider, MD   tamsulosin (FLOMAX) 0.4 MG capsule Take 2 capsules by mouth nightly 11/1/21   Celeste Lester MD   Vitamins/Minerals TABS Take 1 tablet by mouth daily    Historical Provider, MD   lisinopril (PRINIVIL;ZESTRIL) 5 MG tablet Take 5 mg by mouth daily    Historical Provider, MD   ELIQUIS 5 MG TABS tablet TAKE 1 TABLET BY MOUTH TWICE A DAY 8/31/20   JOHNSON Apodaca   QUEtiapine (SEROQUEL) 50 MG tablet Take 50 mg by mouth nightly.     Historical Provider, MD   levothyroxine (SYNTHROID) 137 MCG tablet Take 137 mcg by mouth Daily     Historical Provider, MD       Past Medical History:  Past Medical History:   Diagnosis Date    Atrial fibrillation ([facility] [address]     Bipolar disorder ([facility] [address]     Colon polyps     Hypothyroidism     Insomnia        Past Surgical History:  Past Surgical History:   Procedure Laterality Date    CARPAL TUNNEL RELEASE Right     approx 5 years ago    CLAVICLE SURGERY      x 2    COLONOSCOPY  ? 5-6 years ago        COLONOSCOPY N/A 12/19/2014    Monica:  normal,   5y recall    COLONOSCOPY N/A 05/05/2021    Dr Arteaga Heading, Benign Serrated Polyps (SSA -dysplasia, HP), Moderate diverticulosis, No clear-cut lesions to explain proctalgia/perianal itching, 1 year recall    FOOT SURGERY      x 2    HERNIA REPAIR      JOINT REPLACEMENT Right     hip    THYROIDECTOMY      VARICOSE VEIN SURGERY      VASECTOMY         Social History:  Social History     Tobacco Use    Smoking status: Never Smoker    Smokeless tobacco: Never Used   Vaping Use    Vaping Use: Never used   Substance Use Topics    Alcohol use: Yes     Alcohol/week: 3.0 standard drinks     Types: 3 Glasses of wine per week     Comment: weekly    Drug use: No       Vital Signs:   Vitals:    05/10/22 0755   BP: (!) 145/80   Pulse: 55   Resp: 16   Temp: 97.4 °F (36.3 °C)   SpO2: 98%        Physical Exam:  Cardiac:  [x]WNL  []Comments:  Pulmonary:  [x]WNL   []Comments:  Neuro/Mental Status:  [x]WNL  []Comments:  Abdominal:  [x]WNL    []Comments:  Other:   []WNL  []Comments:    Informed Consent:  The risks and benefits of the procedure have been discussed with either the patient or if they cannot consent, their representative. Assessment:  Patient examined and appropriate for planned sedation and procedure. Plan:  Proceed with planned sedation and procedure as above.          Uriah Meyer MD

## 2022-05-10 NOTE — OP NOTE
Patient: Kristen Carr : 1950  Med Rec#: [id number] Acc#: [id number]   Primary Care Provider Maritza Keith MD    Date of Procedure:  5/10/2022    Endoscopist: Alexis Narvaez MD, MD    Referring Provider: Maritza Keith MD,     Operation Performed: Colonoscopy up to the cecum with hot snare resection of polyps    Indications:   1. Constipation, unspecified constipation type     2. S/P colonoscopic polypectomy      3.  H/o of colon polyps-last colonoscopy on 2021 had revealed:  3 flat sessile polyps were found in the right colon as follows and were resected by hot snare polypectomy technique:  A 1.5 cm in diameter flat sessile polyp was removed in a piecemeal fashion from the proximal ascending colon. A second 1 cm in diameter flat polyp in the same area was removed separately by hot snare technique. A third 8 mm sessile flat polyp at the hepatic flexure likewise was subjected to hot snare polypectomy.     Otherwise, the mucosa appeared normal throughout the entire examined colon      NO larger polyps or masses or strictures or colitis or proctitis. Moderate diverticulosis in the left colon    4. Strong family history of GI cancers including:, Gastric and esophageal    Anesthesia:  Sedation was administered by anesthesia who monitored the patient during the procedure. I met with Kristen Carr prior to procedure. We discussed the procedure itself, and I have discussed the risks of endoscopy (including-- but not limited to-- pain, discomfort, bleeding potentially requiring second endoscopic procedure and/or blood transfusion, organ perforation requiring operative repair, damage to organs near the colon, infection, aspiration, cardiopulmonary/allergic reaction), benefits, indications to endoscopy. Additionally, we discussed options other than colonoscopy. The patient expressed understanding. All questions answered. The patient decided to proceed with the procedure.   Signed discussed w/ the patient. A copy of the images was provided.     Angelique Batres MD, MD  5/10/2022  9:06 AM

## 2022-08-24 NOTE — PROGRESS NOTES
"Subjective:     Encounter Date: 8/25/2022      Patient ID: David Schulz is a 72 y.o. male   HPI: This patient presents today for routine follow-up of outpatient testing.  Fina scan on 3/14/2022 revealed no evidence of ischemia. He has paroxysmal atrial fibrillation on chronic anticoagulation, paroxysmal supraventricular tachycardia, nonsustained ventricular tachycardia, nonischemic cardiomyopathy, hypertension and hypothyroidism.  He continues to report dyspnea with moderate to heavy exertion. He reports intermittent palpitations that seem to occur more frequently in the mornings. He reports intermittent episodes of dizziness as well. He complains of bilateral lower extremities feeling \"cold\". He reports intermittent chest tightness. He complains of intermittent headaches. He complains of increased fatigue as well. Patient denies syncope, orthopnea, PND, edema or decreased stamina.  Patient denies any signs of bleeding.    Chief Complaint: Routine follow-up  Atrial Fibrillation  Presents for follow-up visit. Symptoms include chest pain, dizziness, palpitations and shortness of breath. Symptoms are negative for an AICD problem, bradycardia, hemodynamic instability, hypertension, hypotension, pacemaker problem, syncope, tachycardia and weakness. The symptoms have been stable. Past medical history includes atrial fibrillation. There are no medication compliance problems.   Cardiomyopathy  This is a chronic problem. The current episode started more than 1 month ago. Associated symptoms include chest pain and headaches. Pertinent negatives include no abdominal pain, anorexia, arthralgias, change in bowel habit, chills, congestion, coughing, diaphoresis, fatigue, fever, joint swelling, myalgias, nausea, neck pain, numbness, rash, sore throat, swollen glands, urinary symptoms, vertigo, visual change, vomiting or weakness.   Hypertension  This is a chronic problem. The current episode started more than 1 month ago. The " Prednsione sent to pt's preferred pharmacy   problem is uncontrolled. Associated symptoms include chest pain, headaches, palpitations and shortness of breath. Pertinent negatives include no anxiety, blurred vision, malaise/fatigue, neck pain, orthopnea, peripheral edema, PND or sweats. Risk factors for coronary artery disease include male gender. Current antihypertension treatment includes ACE inhibitors. The current treatment provides mild improvement.       Previous Cardiac Testing:  Results for orders placed during the hospital encounter of 01/10/22    Adult Transthoracic Echo Complete w/ Color, Spectral and Contrast if necessary per protocol    Interpretation Summary  · Left ventricular ejection fraction appears to be 56 - 60%. Left ventricular systolic function is normal.  · Left ventricular diastolic function was normal.  · Abnormal global longitudinal LV strain (GLS) = -14.0%.  · Mild aortic valve regurgitation is present.  · Estimated right ventricular systolic pressure from tricuspid regurgitation is normal (<35 mmHg).    No results found for this or any previous visit.      The following portions of the patient's history were reviewed and updated as appropriate: allergies, current medications, past family history, past medical history, past social history, past surgical history and problem list.    Allergies   Allergen Reactions   • Morphine Itching   • Morphine And Related    • Carvedilol Nausea And Vomiting       Current Outpatient Medications:   •  Eliquis 5 MG tablet tablet, TAKE 1 TABLET BY MOUTH EVERY 12 HOURS, Disp: 60 tablet, Rfl: 11  •  gabapentin (NEURONTIN) 300 MG capsule, Take 300 mg by mouth 1 (One) Time., Disp: , Rfl:   •  L-Lysine 500 MG tablet tablet, , Disp: , Rfl:   •  levothyroxine (SYNTHROID, LEVOTHROID) 125 MCG tablet, , Disp: , Rfl:   •  levothyroxine (SYNTHROID, LEVOTHROID) 137 MCG tablet, Take 137 mcg by mouth Daily., Disp: , Rfl:   •  lisinopril (PRINIVIL,ZESTRIL) 10 MG tablet, Take 1 tablet by mouth Daily., Disp: 30 tablet,  Rfl: 11  •  multivitamin with minerals tablet tablet, Take 1 tablet by mouth Daily., Disp: , Rfl:   •  QUEtiapine (SEROquel) 50 MG tablet, 1 TABLET(S) BY MOUTH NIGHTLY, Disp: , Rfl: 3  •  tamsulosin (FLOMAX) 0.4 MG capsule 24 hr capsule, Take 1 capsule by mouth every night at bedtime., Disp: 90 capsule, Rfl: 3  Past Medical History:   Diagnosis Date   • Acute retention of urine    • Atrial fibrillation (HCC)    • BPH (benign prostatic hyperplasia)    • Hyperthyroidism    • Microscopic hematuria    • Peyronie's disease      Past Surgical History:   Procedure Laterality Date   • CARPAL TUNNEL RELEASE     • CLAVICLE SURGERY     • THYROIDECTOMY     • THYROIDECTOMY     • TOTAL HIP ARTHROPLASTY     • VASECTOMY       Family History   Problem Relation Age of Onset   • Heart disease Father    • Heart attack Father    • No Known Problems Mother    • Heart attack Brother    • Heart disease Brother    • Heart disease Paternal Grandfather    • Heart attack Paternal Grandfather      Social History     Socioeconomic History   • Marital status:    Tobacco Use   • Smoking status: Former Smoker   • Smokeless tobacco: Never Used   Vaping Use   • Vaping Use: Never used   Substance and Sexual Activity   • Alcohol use: Yes   • Drug use: Never   • Sexual activity: Defer       Review of Systems   Constitutional: Negative for chills, decreased appetite, diaphoresis, fatigue, fever, malaise/fatigue, night sweats, weight gain and weight loss.   HENT: Negative for congestion, nosebleeds and sore throat.    Eyes: Negative for blurred vision and visual disturbance.   Cardiovascular: Positive for chest pain, dyspnea on exertion and palpitations. Negative for leg swelling, near-syncope, orthopnea, paroxysmal nocturnal dyspnea and syncope.   Respiratory: Positive for shortness of breath. Negative for cough, hemoptysis, snoring and wheezing.    Endocrine: Negative for cold intolerance and heat intolerance.   Hematologic/Lymphatic: Does not  bruise/bleed easily.   Skin: Negative for rash.   Musculoskeletal: Negative for arthralgias, back pain, falls, joint swelling, myalgias and neck pain.   Gastrointestinal: Negative for abdominal pain, anorexia, change in bowel habit, constipation, diarrhea, dysphagia, heartburn, nausea and vomiting.   Genitourinary: Negative for hematuria.   Neurological: Positive for dizziness and headaches. Negative for light-headedness, numbness, vertigo and weakness.   Psychiatric/Behavioral: Negative for altered mental status.   Allergic/Immunologic: Negative for persistent infections.              Objective:     Vitals and nursing note reviewed.   Constitutional:       General: Awake.      Appearance: Normal and healthy appearance. Well-developed, normal weight and not in distress.   Eyes:      General: Lids are normal.      Conjunctiva/sclera: Conjunctivae normal.      Pupils: Pupils are equal, round, and reactive to light.   HENT:      Head: Normocephalic and atraumatic.      Nose: Nose normal.   Neck:      Vascular: No JVR. JVD normal.   Pulmonary:      Effort: Pulmonary effort is normal.      Breath sounds: Normal breath sounds. No wheezing. No rhonchi. No rales.   Chest:      Chest wall: Not tender to palpatation.   Cardiovascular:      PMI at left midclavicular line. Bradycardia present. Regular rhythm. Normal S1. Normal S2.      Murmurs: There is no murmur.      No gallop. No click. No rub.   Pulses:     Intact distal pulses.   Edema:     Peripheral edema absent.   Abdominal:      General: Bowel sounds are normal.      Palpations: Abdomen is soft.      Tenderness: There is no abdominal tenderness.   Musculoskeletal: Normal range of motion.         General: No tenderness.      Cervical back: Normal range of motion. Skin:     General: Skin is warm and dry.   Neurological:      General: No focal deficit present.      Mental Status: Alert, oriented to person, place, and time and oriented to person, place and time.  "  Psychiatric:         Attention and Perception: Attention and perception normal.         Mood and Affect: Mood and affect normal.         Speech: Speech normal.         Behavior: Behavior normal. Behavior is cooperative.         Thought Content: Thought content normal.         Cognition and Memory: Cognition and memory normal.         Judgment: Judgment normal.             ECG 12 Lead    Date/Time: 8/25/2022 11:39 AM  Performed by: Becka Chirinos APRN  Authorized by: Becka Chirinos APRN   Comparison: compared with previous ECG from 11/17/2021  Similar to previous ECG  Rhythm: sinus bradycardia  Rate: bradycardic  BPM: 59  Q waves: II, III and aVF    T inversion: III  QRS axis: normal  Other findings: left ventricular hypertrophy    Clinical impression: abnormal EKG          /84   Pulse 59   Ht 170.2 cm (67\")   Wt 74.4 kg (164 lb)   SpO2 99%   BMI 25.69 kg/m²   Lab Review:   Lab Results   Component Value Date    WBC 3.8 (L) 06/06/2020    HGB 13.5 (L) 06/06/2020    HCT 38.4 (L) 06/06/2020    MCV 94.1 (H) 06/06/2020     06/06/2020     Lab Results   Component Value Date    GLUCOSE 94 02/17/2020    BUN 9 02/17/2020    CREATININE 0.67 (L) 07/13/2020    EGFRIFNONA 117 07/13/2020    K 4.1 02/17/2020    CO2 28 02/17/2020    CALCIUM 8.4 (L) 02/17/2020    ALBUMIN 4.4 02/17/2020    AST 18 02/17/2020    ALT 12 02/17/2020     Lab Results   Component Value Date    CHLPL 192 02/17/2020    CHLPL 198 09/01/2019     Lab Results   Component Value Date    TRIG 63 02/17/2020    TRIG 83 09/01/2019     Lab Results   Component Value Date    HDL 79 02/17/2020    HDL 71 09/01/2019     Lab Results   Component Value Date     02/17/2020     09/01/2019       I have reviewed the most recent lab results.       Assessment:          Diagnosis Plan   1. PAF (paroxysmal atrial fibrillation) (CMS/HCC)  In sinus rhythm today. Pt has been on Multaq in the past, however this was discontinued due to side effects. " Unable to tolerate betablocker due to bradycardia. Anticoagulated.    2. Current use of long term anticoagulation  On Eliquis. Denies bleeding.    3. Non-ischemic cardiomyopathy (CMS/HCC)  Previously reduced EF now improved to 70%. No clinical signs or symptoms of heart failure.    4. Paroxysmal SVT (supraventricular tachycardia) (CMS/HCC)  308 runs on holter monitor 12/21 with longest duration of 16 seconds. Pt unable to tolerate betablocker due to bradycardia. Consider EP referral if episodes increase in frequency or duration. Check 14 day holter monitor.    5. NSVT (nonsustained ventricular tachycardia) (CMS/HCC)  7 second run on holter monitor 12/21. Stable.    6. Hypothyroidism (acquired)  Managed by PCP.    7. Primary hypertension Blood pressures have been elevated. Increase lisinopril to 10 mg daily.  Monitor and record daily blood pressure. Report readings consistently higher than 130/90 or consistently lower than 100/60.           Plan:         1. Continue other medications as previously prescribed.  2. Report any worsening symptoms.  3. Report any signs of bleeding.  4. Continue heart healthy diet and regular exercise as tolerated.   5. Follow up with PCP for blood pressure and cholesterol management and routine lab work.  6. Follow up in one month, or sooner if needed.   7. Reviewed signs and symptoms of CHF and what to report with the patient. Patient instructed to restrict sodium and weigh daily. Report weight gain of greater than 2 lbs overnight or 5 lbs in 1 week. Pt verbalized understanding of instructions and plan of care.   8. Check 14 day holter monitor.

## 2022-08-25 ENCOUNTER — OFFICE VISIT (OUTPATIENT)
Dept: CARDIOLOGY | Facility: CLINIC | Age: 72
End: 2022-08-25

## 2022-08-25 VITALS
HEIGHT: 67 IN | DIASTOLIC BLOOD PRESSURE: 84 MMHG | BODY MASS INDEX: 25.74 KG/M2 | OXYGEN SATURATION: 99 % | HEART RATE: 59 BPM | WEIGHT: 164 LBS | SYSTOLIC BLOOD PRESSURE: 132 MMHG

## 2022-08-25 DIAGNOSIS — I47.29 NSVT (NONSUSTAINED VENTRICULAR TACHYCARDIA): ICD-10-CM

## 2022-08-25 DIAGNOSIS — Z79.01 CURRENT USE OF LONG TERM ANTICOAGULATION: ICD-10-CM

## 2022-08-25 DIAGNOSIS — I48.0 PAF (PAROXYSMAL ATRIAL FIBRILLATION): Primary | ICD-10-CM

## 2022-08-25 DIAGNOSIS — E03.9 HYPOTHYROIDISM (ACQUIRED): ICD-10-CM

## 2022-08-25 DIAGNOSIS — I42.8 NON-ISCHEMIC CARDIOMYOPATHY: Chronic | ICD-10-CM

## 2022-08-25 DIAGNOSIS — I47.1 PAROXYSMAL SVT (SUPRAVENTRICULAR TACHYCARDIA): ICD-10-CM

## 2022-08-25 PROCEDURE — 99214 OFFICE O/P EST MOD 30 MIN: CPT | Performed by: NURSE PRACTITIONER

## 2022-08-25 PROCEDURE — 93000 ELECTROCARDIOGRAM COMPLETE: CPT | Performed by: NURSE PRACTITIONER

## 2022-08-25 RX ORDER — LISINOPRIL 10 MG/1
10 TABLET ORAL DAILY
Qty: 30 TABLET | Refills: 11 | Status: SHIPPED | OUTPATIENT
Start: 2022-08-25 | End: 2023-03-22

## 2022-09-02 ENCOUNTER — OFFICE VISIT (OUTPATIENT)
Dept: UROLOGY | Age: 72
End: 2022-09-02
Payer: MEDICARE

## 2022-09-02 VITALS
DIASTOLIC BLOOD PRESSURE: 84 MMHG | BODY MASS INDEX: 24.74 KG/M2 | SYSTOLIC BLOOD PRESSURE: 132 MMHG | WEIGHT: 163.2 LBS | HEIGHT: 68 IN | TEMPERATURE: 97.2 F

## 2022-09-02 DIAGNOSIS — N40.1 BENIGN PROSTATIC HYPERPLASIA WITH WEAK URINARY STREAM: Primary | ICD-10-CM

## 2022-09-02 DIAGNOSIS — R39.12 BENIGN PROSTATIC HYPERPLASIA WITH WEAK URINARY STREAM: Primary | ICD-10-CM

## 2022-09-02 PROCEDURE — 1036F TOBACCO NON-USER: CPT | Performed by: UROLOGY

## 2022-09-02 PROCEDURE — 99214 OFFICE O/P EST MOD 30 MIN: CPT | Performed by: UROLOGY

## 2022-09-02 PROCEDURE — 3017F COLORECTAL CA SCREEN DOC REV: CPT | Performed by: UROLOGY

## 2022-09-02 PROCEDURE — 1123F ACP DISCUSS/DSCN MKR DOCD: CPT | Performed by: UROLOGY

## 2022-09-02 PROCEDURE — G8427 DOCREV CUR MEDS BY ELIG CLIN: HCPCS | Performed by: UROLOGY

## 2022-09-02 PROCEDURE — G8420 CALC BMI NORM PARAMETERS: HCPCS | Performed by: UROLOGY

## 2022-09-02 PROCEDURE — 81002 URINALYSIS NONAUTO W/O SCOPE: CPT | Performed by: UROLOGY

## 2022-09-02 RX ORDER — LISINOPRIL 10 MG/1
TABLET ORAL
COMMUNITY
Start: 2022-08-25

## 2022-09-02 ASSESSMENT — ENCOUNTER SYMPTOMS
VOMITING: 0
EYE DISCHARGE: 0
COUGH: 0
SHORTNESS OF BREATH: 0
ABDOMINAL PAIN: 0
DIARRHEA: 0
NAUSEA: 0
TROUBLE SWALLOWING: 0
CONSTIPATION: 0
BACK PAIN: 0
EYE REDNESS: 0
ABDOMINAL DISTENTION: 0

## 2022-09-02 NOTE — PROGRESS NOTES
Barbara Kent is a 67 y.o. male who presents today   Chief Complaint   Patient presents with    Follow-up     I am here today for my 6 month follow for elevated PSA, my PSA is done      BPH / LUTS:  Patient is here today for lower urinary tract symptoms which started several year(s) ago. Recently the urinary symptoms are: are worsening  Current medical Rx for BPH/LUTS: Tamsulosin 0.8 mg he is feel this is now is not working as good  Previous treatments tried for LUTS: Medical therapy with alpha-blocker  Previous surgical intervention: none  Urinary retention: No  Any associated gross hematuria? no  History of recurrent UTIs: no  His AUA Symptom Score is, 1935   Patient states symptoms are of moderate to severe in severity. He complains of incomplete emptying intermittency frequency and for lesser degrees of urgency decreased force of stream straining and nocturia x2.       Past Medical History:   Diagnosis Date    Atrial fibrillation ([address]     Bipolar disorder ([facility] [address]     Colon polyps     Hypothyroidism     Insomnia        Past Surgical History:   Procedure Laterality Date    CARPAL TUNNEL RELEASE Right     approx 5 years ago    CLAVICLE SURGERY      x 2    COLONOSCOPY  ? 5-6 years ago        COLONOSCOPY N/A 12/19/2014    Monica:  normal,   5y recall    COLONOSCOPY N/A 05/05/2021    Dr Marcelina Barrett, Benign Serrated Polyps (SSA -dysplasia, HP), Moderate diverticulosis, No clear-cut lesions to explain proctalgia/perianal itching, 1 year recall    COLONOSCOPY N/A 05/10/2022    Dr Marcelina Barrett, [facility] , Benign HP, Mod diverticulosis, 3 year colon recall    FOOT SURGERY      x 2    HERNIA REPAIR      JOINT REPLACEMENT Right     hip    THYROIDECTOMY      VARICOSE VEIN SURGERY      VASECTOMY         Current Outpatient Medications   Medication Sig Dispense Refill    tamsulosin (FLOMAX) 0.4 MG capsule Take 2 capsules by mouth nightly 180 capsule 3    Vitamins/Minerals TABS Take 1 tablet by mouth daily      ELIQUIS 5 MG TABS tablet TAKE 1 TABLET BY MOUTH TWICE A DAY 60 tablet 5    QUEtiapine (SEROQUEL) 50 MG tablet Take 50 mg by mouth nightly. levothyroxine (SYNTHROID) 137 MCG tablet Take 137 mcg by mouth Daily       lisinopril (PRINIVIL;ZESTRIL) 10 MG tablet TAKE 1 TABLET BY MOUTH EVERY DAY       No current facility-administered medications for this visit. 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)    Here with his significant other    Previously  and     He has 1 son and 1 daughter    Education masters degree in fine arts    Former distance runner also bicyclist not so much these days    Smoked minimally 3 to 4 years total many years ago denies alcohol consumption or substance usage       Family History   Problem Relation Age of Onset    Dementia Mother     Obesity Mother     Stroke Father     Heart Attack Father     Other Sister     Other Brother         reheumatic fever- valve issue    Colon Cancer Neg Hx     Colon Polyps Neg Hx     Esophageal Cancer Neg Hx     Liver Cancer Neg Hx     Rectal Cancer Neg Hx     Stomach Cancer Neg Hx        REVIEW OF SYSTEMS:  Review of Systems   Unable to perform ROS: Other   Constitutional:  Negative for chills, fatigue and fever. HENT:  Negative for congestion, ear pain and trouble swallowing. Eyes:  Negative for discharge and redness.    Respiratory:  Negative for cough and shortness of breath. Cardiovascular:  Negative for chest pain. Gastrointestinal:  Negative for abdominal distention, abdominal pain, constipation, diarrhea, nausea and vomiting. Endocrine: Negative for cold intolerance and heat intolerance. Genitourinary:  Negative for difficulty urinating, dysuria, flank pain, frequency, hematuria and urgency. Musculoskeletal:  Negative for back pain and gait problem. Skin:  Negative for pallor and wound. Allergic/Immunologic: Negative for environmental allergies and immunocompromised state. Neurological:  Negative for dizziness and weakness. Hematological:  Negative for adenopathy. Does not bruise/bleed easily. Psychiatric/Behavioral:  Negative for agitation and confusion. PHYSICAL EXAM:  /84   Temp 97.2 °F (36.2 °C) (Temporal)   Ht 5' 8\" (1.727 m)   Wt 163 lb 3.2 oz (74 kg)   BMI 24.81 kg/m²   Physical Exam  Constitutional:       General: He is not in acute distress. Appearance: Normal appearance. He is well-developed. HENT:      Head: Normocephalic and atraumatic. Nose: Nose normal.   Eyes:      General: No scleral icterus. Conjunctiva/sclera: Conjunctivae normal.      Pupils: Pupils are equal, round, and reactive to light. Neck:      Trachea: No tracheal deviation. Cardiovascular:      Rate and Rhythm: Normal rate and regular rhythm. Pulses: Normal pulses. Pulmonary:      Effort: Pulmonary effort is normal. No respiratory distress. Breath sounds: No stridor. Abdominal:      General: There is no distension. Palpations: Abdomen is soft. There is no mass. Tenderness: There is no abdominal tenderness. Genitourinary:     Prostate: Enlarged (40 g flat nonsuspicious). No nodules present. Rectum: Abnormal anal tone (Narrowed anus). Musculoskeletal:         General: No tenderness or deformity. Normal range of motion. Cervical back: Normal range of motion and neck supple.    Lymphadenopathy: Cervical: No cervical adenopathy. Skin:     General: Skin is warm and dry. Findings: No erythema. Neurological:      General: No focal deficit present. Mental Status: He is alert and oriented to person, place, and time. Psychiatric:         Behavior: Behavior normal.         Judgment: Judgment normal.           DATA:    Results for orders placed or performed in visit on 09/02/22   POCT Urinalysis no Micro   Result Value Ref Range    Color, UA yellow     Clarity, UA clear     Glucose, UA POC neg     Bilirubin, UA neg     Ketones, UA neg     Spec Grav, UA 1.015     Blood, UA POC trace     pH, UA 7.0     Protein, UA POC neg     Urobilinogen, UA 0.2     Leukocytes, UA neg     Nitrite, UA neg     Appearance, Fluid Clear Clear, Slightly Cloudy     Lab Results   Component Value Date    PSA 0.26 02/17/2020     No results found for: PSAFREEPCT    PSA done outside lab on 8/26/2022 was 0.2       1. Benign prostatic hyperplasia with weak urinary stream  Normal CASSIDY normal PSA he will continue tamsulosin 0.8 mg however he feels like his symptoms have progressed on alpha-blocker medical therapy therefore we will get him scheduled for cystoscopy. We discussed surgical intervention with ELEN Stern TURP. He seems like he might be most interested in TURP  - POCT Urinalysis no Micro  - PSA, Diagnostic; Future      Orders Placed This Encounter   Procedures    PSA, Diagnostic     In 1 year prior to the next visit     Standing Status:   Future     Standing Expiration Date:   9/2/2024    POCT Urinalysis no Micro    [place] CYSTOURETHROSCOPY     Next available     Standing Status:   Future     Standing Expiration Date:   9/2/2023        Return for Cystoscopy on next visit. All information inputted into the note by the MA to include chief complaint, past medical history, past surgical history, medications, allergies, social and family history and review of systems has been reviewed and updated as needed by me.     EMR Dragon/transcription disclaimer: Much of this documentt is electronic  transcription/translation of spoken language to printed text. The  electronic translation of spoken language may be erroneous, or at times,  nonsensical words or phrases may be inadvertently transcribed.  Although I  have reviewed the document for such errors, some may still exist.

## 2022-10-05 NOTE — PROGRESS NOTES
"Subjective:     Encounter Date: 10/6/2022    Patient ID: David Schulz is a 72 y.o. male   HPI: This patient presents today for routine follow-up of medication adjustments and outpatient testing.  Lisinopril was increased to 10 mg daily at his last office visit on 8/25/2022 due to uncontrolled hypertension.  14-day Holter monitor revealed predominantly sinus rhythm with frequent supraventricular ectopic beats with APC burden of 5%, rare premature ventricular contractions with a PVC burden of less than 1%, 2 runs of nonsustained ventricular tachycardia with longest duration of 7 beats, 1094 runs of supraventricular tachycardia with longest duration of 13.8 seconds at an average rate of 111 bpm, no significant pauses and no correlated arrhythmia. He has paroxysmal atrial fibrillation on chronic anticoagulation, paroxysmal supraventricular tachycardia, nonsustained ventricular tachycardia, nonischemic cardiomyopathy, hypertension and hypothyroidism.  He continues to report dyspnea with moderate to heavy exertion. He reports intermittent palpitations that seem to occur more frequently in the mornings. He reports intermittent episodes of dizziness as well. He complains of bilateral lower extremities feeling \"cold\". He reports intermittent chest tightness. He complains of intermittent headaches. He complains of increased fatigue as well. Patient denies syncope, orthopnea, PND, edema or decreased stamina.  Patient denies any signs of bleeding.    Chief Complaint: Routine follow-up  Atrial Fibrillation  Presents for follow-up visit. Symptoms include chest pain, dizziness, palpitations and shortness of breath. Symptoms are negative for an AICD problem, bradycardia, hemodynamic instability, hypertension, hypotension, pacemaker problem, syncope, tachycardia and weakness. The symptoms have been stable. Past medical history includes atrial fibrillation. There are no medication compliance problems.   Cardiomyopathy  This is a " chronic problem. The current episode started more than 1 month ago. Associated symptoms include chest pain and headaches. Pertinent negatives include no abdominal pain, anorexia, arthralgias, change in bowel habit, chills, congestion, coughing, diaphoresis, fatigue, fever, joint swelling, myalgias, nausea, neck pain, numbness, rash, sore throat, swollen glands, urinary symptoms, vertigo, visual change, vomiting or weakness.   Hypertension  This is a chronic problem. The current episode started more than 1 month ago. The problem is controlled. Associated symptoms include chest pain, headaches, palpitations and shortness of breath. Pertinent negatives include no anxiety, blurred vision, malaise/fatigue, neck pain, orthopnea, peripheral edema, PND or sweats. Risk factors for coronary artery disease include male gender. Current antihypertension treatment includes ACE inhibitors. The current treatment provides mild improvement.     I have reviewed and confirmed the accuracy of the HPI YSABEL Fletcher        Previous Cardiac Testing:  Results for orders placed during the hospital encounter of 01/10/22    Adult Transthoracic Echo Complete w/ Color, Spectral and Contrast if necessary per protocol    Interpretation Summary  · Left ventricular ejection fraction appears to be 56 - 60%. Left ventricular systolic function is normal.  · Left ventricular diastolic function was normal.  · Abnormal global longitudinal LV strain (GLS) = -14.0%.  · Mild aortic valve regurgitation is present.  · Estimated right ventricular systolic pressure from tricuspid regurgitation is normal (<35 mmHg).    No results found for this or any previous visit.      The following portions of the patient's history were reviewed and updated as appropriate: allergies, current medications, past family history, past medical history, past social history, past surgical history and problem list.    Allergies   Allergen Reactions   • Morphine Itching   •  Morphine And Related    • Carvedilol Nausea And Vomiting       Current Outpatient Medications:   •  Eliquis 5 MG tablet tablet, TAKE 1 TABLET BY MOUTH EVERY 12 HOURS, Disp: 60 tablet, Rfl: 11  •  L-Lysine 500 MG tablet tablet, , Disp: , Rfl:   •  levothyroxine (SYNTHROID, LEVOTHROID) 137 MCG tablet, Take 137 mcg by mouth Daily., Disp: , Rfl:   •  lisinopril (PRINIVIL,ZESTRIL) 10 MG tablet, Take 1 tablet by mouth Daily., Disp: 30 tablet, Rfl: 11  •  QUEtiapine (SEROquel) 50 MG tablet, 1 TABLET(S) BY MOUTH NIGHTLY, Disp: , Rfl: 3  •  tamsulosin (FLOMAX) 0.4 MG capsule 24 hr capsule, Take 1 capsule by mouth every night at bedtime., Disp: 90 capsule, Rfl: 3  Past Medical History:   Diagnosis Date   • Acute retention of urine    • Atrial fibrillation (HCC)    • BPH (benign prostatic hyperplasia)    • Hyperthyroidism    • Microscopic hematuria    • Peyronie's disease      Past Surgical History:   Procedure Laterality Date   • CARPAL TUNNEL RELEASE     • CLAVICLE SURGERY     • THYROIDECTOMY     • THYROIDECTOMY     • TOTAL HIP ARTHROPLASTY     • VASECTOMY       Family History   Problem Relation Age of Onset   • Heart disease Father    • Heart attack Father    • No Known Problems Mother    • Heart attack Brother    • Heart disease Brother    • Heart disease Paternal Grandfather    • Heart attack Paternal Grandfather      Social History     Socioeconomic History   • Marital status:    Tobacco Use   • Smoking status: Former Smoker   • Smokeless tobacco: Never Used   Vaping Use   • Vaping Use: Never used   Substance and Sexual Activity   • Alcohol use: Yes   • Drug use: Never   • Sexual activity: Defer       Review of Systems   Constitutional: Negative for chills, decreased appetite, diaphoresis, fatigue, fever, malaise/fatigue, night sweats, weight gain and weight loss.   HENT: Negative for congestion, nosebleeds and sore throat.    Eyes: Negative for blurred vision and visual disturbance.   Cardiovascular: Positive  for chest pain, dyspnea on exertion and palpitations. Negative for leg swelling, near-syncope, orthopnea, paroxysmal nocturnal dyspnea and syncope.   Respiratory: Positive for shortness of breath. Negative for cough, hemoptysis, snoring and wheezing.    Endocrine: Negative for cold intolerance and heat intolerance.   Hematologic/Lymphatic: Does not bruise/bleed easily.   Skin: Negative for rash.   Musculoskeletal: Negative for arthralgias, back pain, falls, joint swelling, myalgias and neck pain.   Gastrointestinal: Negative for abdominal pain, anorexia, change in bowel habit, constipation, diarrhea, dysphagia, heartburn, nausea and vomiting.   Genitourinary: Negative for hematuria.   Neurological: Positive for dizziness and headaches. Negative for light-headedness, numbness, vertigo and weakness.   Psychiatric/Behavioral: Negative for altered mental status.   Allergic/Immunologic: Negative for persistent infections.       I have reviewed and confirmed the accuracy of the ROS YSABEL Fletcher           Objective:     Vitals and nursing note reviewed.   Constitutional:       General: Awake.      Appearance: Normal and healthy appearance. Well-developed, normal weight and not in distress.   Eyes:      General: Lids are normal.      Conjunctiva/sclera: Conjunctivae normal.      Pupils: Pupils are equal, round, and reactive to light.   HENT:      Head: Normocephalic and atraumatic.      Nose: Nose normal.   Neck:      Vascular: No JVR. JVD normal.   Pulmonary:      Effort: Pulmonary effort is normal.      Breath sounds: Normal breath sounds. No wheezing. No rhonchi. No rales.   Chest:      Chest wall: Not tender to palpatation.   Cardiovascular:      PMI at left midclavicular line. Bradycardia present. Regular rhythm. Normal S1. Normal S2.      Murmurs: There is a grade 2/6 high frequency blowing holosystolic murmur at the apex. There is a grade 2/4 high frequency blowing decrescendo, early diastolic murmur at  "the URSB, radiating to the apex.      No gallop. No click. No rub.   Pulses:     Intact distal pulses.   Edema:     Peripheral edema absent.   Abdominal:      General: Bowel sounds are normal.      Palpations: Abdomen is soft.      Tenderness: There is no abdominal tenderness.   Musculoskeletal: Normal range of motion.         General: No tenderness.      Cervical back: Normal range of motion. Skin:     General: Skin is warm and dry.   Neurological:      General: No focal deficit present.      Mental Status: Alert, oriented to person, place, and time and oriented to person, place and time.   Psychiatric:         Attention and Perception: Attention and perception normal.         Mood and Affect: Mood and affect normal.         Speech: Speech normal.         Behavior: Behavior normal. Behavior is cooperative.         Thought Content: Thought content normal.         Cognition and Memory: Cognition and memory normal.         Judgment: Judgment normal.           Procedures  /80 (BP Location: Right arm, Patient Position: Sitting, Cuff Size: Adult)   Pulse 51   Ht 170.2 cm (67.01\")   Wt 74.8 kg (165 lb)   SpO2 97%   BMI 25.84 kg/m²   Lab Review:   Lab Results   Component Value Date    WBC 3.8 (L) 06/06/2020    HGB 13.5 (L) 06/06/2020    HCT 38.4 (L) 06/06/2020    MCV 94.1 (H) 06/06/2020     06/06/2020     Lab Results   Component Value Date    GLUCOSE 94 02/17/2020    BUN 9 02/17/2020    CREATININE 0.67 (L) 07/13/2020    EGFRIFNONA 117 07/13/2020    K 4.1 02/17/2020    CO2 28 02/17/2020    CALCIUM 8.4 (L) 02/17/2020    ALBUMIN 4.4 02/17/2020    AST 18 02/17/2020    ALT 12 02/17/2020     Lab Results   Component Value Date    CHLPL 192 02/17/2020    CHLPL 198 09/01/2019     Lab Results   Component Value Date    TRIG 63 02/17/2020    TRIG 83 09/01/2019     Lab Results   Component Value Date    HDL 79 02/17/2020    HDL 71 09/01/2019     Lab Results   Component Value Date     02/17/2020     " 09/01/2019       I have reviewed the most recent lab results.       Assessment:          Diagnosis Plan   1. PAF (paroxysmal atrial fibrillation) (CMS/[facility])  In sinus rhythm today. Pt has been on Multaq in the past, however this was discontinued due to side effects. Unable to tolerate betablocker due to bradycardia. Anticoagulated.    2. Current use of long term anticoagulation  On Eliquis. Denies bleeding.    3. Non-ischemic cardiomyopathy (CMS/[facility])  Previously reduced EF now improved to 70%. No clinical signs or symptoms of heart failure.    4. Paroxysmal SVT (supraventricular tachycardia) (CMS/[facility])   1094 runs with longest duration of 13.8 seconds on recent 14-day Holter monitor.  Patient has been unable to tolerate beta-blockers in the past due to bradycardia.  Referral to electrophysiology.   5. NSVT (nonsustained ventricular tachycardia) (CMS/[facility])  7 beat run on holter monitor 8/30/2022. Stable.    6. Hypothyroidism (acquired)  Managed by PCP.    7. Primary hypertension Blood pressure is markedly elevated in office today, however pt presents blood pressure log with blood pressures consistently lower than 130/90. Continue lisinopril.  Monitor and record daily blood pressure. Report readings consistently higher than 130/90 or consistently lower than 100/60.           Plan:         1. Continue medications as previously prescribed.  2. Report any worsening symptoms.  3. Report any signs of bleeding.  4. Continue heart healthy diet and regular exercise as tolerated.   5. Follow up with PCP for blood pressure and cholesterol management and routine lab work.  6. Follow up in 3 months, or sooner if needed.   7. Reviewed signs and symptoms of CHF and what to report with the patient. Patient instructed to restrict sodium and weigh daily. Report weight gain of greater than 2 lbs overnight or 5 lbs in 1 week. Pt verbalized understanding of instructions and plan of care.   8.  Referral to electrophysiology.    Advance Care  Planning   ACP discussion was held with the patient during this visit. Patient does not have an advance directive, information provided.

## 2022-10-06 ENCOUNTER — OFFICE VISIT (OUTPATIENT)
Dept: CARDIOLOGY | Facility: CLINIC | Age: 72
End: 2022-10-06

## 2022-10-06 VITALS
DIASTOLIC BLOOD PRESSURE: 80 MMHG | BODY MASS INDEX: 25.9 KG/M2 | WEIGHT: 165 LBS | HEIGHT: 67 IN | HEART RATE: 51 BPM | SYSTOLIC BLOOD PRESSURE: 171 MMHG | OXYGEN SATURATION: 97 %

## 2022-10-06 DIAGNOSIS — I47.29 NSVT (NONSUSTAINED VENTRICULAR TACHYCARDIA): ICD-10-CM

## 2022-10-06 DIAGNOSIS — I42.8 NON-ISCHEMIC CARDIOMYOPATHY: Chronic | ICD-10-CM

## 2022-10-06 DIAGNOSIS — E03.9 HYPOTHYROIDISM (ACQUIRED): ICD-10-CM

## 2022-10-06 DIAGNOSIS — Z79.01 CURRENT USE OF LONG TERM ANTICOAGULATION: ICD-10-CM

## 2022-10-06 DIAGNOSIS — I48.0 PAF (PAROXYSMAL ATRIAL FIBRILLATION): Primary | ICD-10-CM

## 2022-10-06 DIAGNOSIS — I10 PRIMARY HYPERTENSION: ICD-10-CM

## 2022-10-06 DIAGNOSIS — I47.1 PAROXYSMAL SVT (SUPRAVENTRICULAR TACHYCARDIA): ICD-10-CM

## 2022-10-06 PROCEDURE — 99214 OFFICE O/P EST MOD 30 MIN: CPT | Performed by: NURSE PRACTITIONER

## 2022-10-06 NOTE — PATIENT INSTRUCTIONS
Advance Care Planning and Advance Directives     You make decisions on a daily basis - decisions about where you want to live, your career, your home, your life. Perhaps one of the most important decisions you face is your choice for future medical care. Take time to talk with your family and your healthcare team and start planning today.  Advance Care Planning is a process that can help you:  Understand possible future healthcare decisions in light of your own experiences  Reflect on those decision in light of your goals and values  Discuss your decisions with those closest to you and the healthcare professionals that care for you  Make a plan by creating a document that reflects your wishes    Surrogate Decision Maker  In the event of a medical emergency, which has left you unable to communicate or to make your own decisions, you would need someone to make decisions for you.  It is important to discuss your preferences for medical treatment with this person while you are in good health.     Qualities of a surrogate decision maker:  Willing to take on this role and responsibility  Knows what you want for future medical care  Willing to follow your wishes even if they don't agree with them  Able to make difficult medical decisions under stressful circumstances    Advance Directives  These are legal documents you can create that will guide your healthcare team and decision maker(s) when needed. These documents can be stored in the electronic medical record.    Living Will - a legal document to guide your care if you have a terminal condition or a serious illness and are unable to communicate. States vary by statute in document names/types, but most forms may include one or more of the following:        -  Directions regarding life-prolonging treatments        -  Directions regarding artificially provided nutrition/hydration        -  Choosing a healthcare decision maker        -  Direction regarding organ/tissue  donation    Durable Power of  for Healthcare - this document names an -in-fact to make medical decisions for you, but it may also allow this person to make personal and financial decisions for you. Please seek the advice of an  if you need this type of document.    **Advance Directives are not required and no one may discriminate against you if you do not sign one.    Medical Orders  Many states allow specific forms/orders signed by your physician to record your wishes for medical treatment in your current state of health. This form, signed in personal communication with your physician, addresses resuscitation and other medical interventions that you may or may not want.      For more information or to schedule a time with a [facility] Advance Care Planning Facilitator contact: [email or web address] or call [phone] and someone will contact you directly.

## 2022-10-17 ENCOUNTER — PROCEDURE VISIT (OUTPATIENT)
Dept: UROLOGY | Age: 72
End: 2022-10-17
Payer: MEDICARE

## 2022-10-17 VITALS — WEIGHT: 166 LBS | BODY MASS INDEX: 24.59 KG/M2 | HEIGHT: 69 IN | TEMPERATURE: 97.3 F

## 2022-10-17 DIAGNOSIS — N40.1 BENIGN PROSTATIC HYPERPLASIA WITH WEAK URINARY STREAM: ICD-10-CM

## 2022-10-17 DIAGNOSIS — R39.12 BENIGN PROSTATIC HYPERPLASIA WITH WEAK URINARY STREAM: ICD-10-CM

## 2022-10-17 LAB
BILIRUBIN, POC: NORMAL
BLOOD URINE, POC: NORMAL
CLARITY, POC: CLEAR
COLOR, POC: YELLOW
GLUCOSE URINE, POC: NORMAL
KETONES, POC: NORMAL
LEUKOCYTE EST, POC: NORMAL
NITRITE, POC: NORMAL
PH, POC: 6.5
PROTEIN, POC: NORMAL
SPECIFIC GRAVITY, POC: 1.01
UROBILINOGEN, POC: 0.2

## 2022-10-17 PROCEDURE — 51798 US URINE CAPACITY MEASURE: CPT | Performed by: UROLOGY

## 2022-10-17 PROCEDURE — 81003 URINALYSIS AUTO W/O SCOPE: CPT | Performed by: UROLOGY

## 2022-10-17 PROCEDURE — 52000 CYSTOURETHROSCOPY: CPT | Performed by: UROLOGY

## 2022-10-17 ASSESSMENT — ENCOUNTER SYMPTOMS
VOMITING: 0
CHEST TIGHTNESS: 0
WHEEZING: 0
BACK PAIN: 0
SORE THROAT: 0
FACIAL SWELLING: 0
EYE REDNESS: 0
EYE DISCHARGE: 0
NAUSEA: 0

## 2022-10-17 NOTE — PROGRESS NOTES
Lonny Pedro is a 67 y.o. male who presents today   Chief Complaint   Patient presents with    Cystoscopy     I am here today for cysto to evaluate my BPH. BPH / LUTS:  Patient is here today for lower urinary tract symptoms which started several year(s) ago. Recently the urinary symptoms are: are worsening  Current medical Rx for BPH/LUTS: Tamsulosin 0.8 mg he is feel this is now is not working as good  Previous treatments tried for LUTS: Medical therapy with alpha-blocker  Previous surgical intervention: none  Urinary retention: No  Any associated gross hematuria? no  History of recurrent UTIs: no  His AUA Symptom Score is, 19/35   Patient states symptoms are of moderate to severe in severity. He complains of incomplete emptying intermittency frequency and for lesser degrees of urgency decreased force of stream straining and nocturia x2. He is here for cystoscopy for further evaluation to discuss surgical treatment          Cystoscopy Procedure Note    Indications: Diagnosis    Pre-operative Diagnosis: BPH with obstruction    Post-operative Diagnosis: Same    Surgeon: Kaylynn Child MD     Assistants: staff    Anesthesia: Local anesthesia topical 2% lidocaine gel    Procedure Details   The risks, benefits, complications, treatment options, and expected outcomes were discussed with the patient. The patient concurred with the proposed plan, giving informed consent. Cystoscopy was performed today under local anesthesia, using sterile technique. The patient was placed in the supine position, prepped with Hibiclens, and draped in the usual sterile fashion. A 17 Albanian sheath flexible cystoscope was used to inspect both the urethra and bladder using the flexible scope. Findings:  Anterior urethra: normal without strictures and without scarring. Prostate:  Prostatic urethra: 2+ mild to moderate lower lobe hyperplasia mid to apical more anterior on the lateral lobes.   Prostatic hyperplasia. median lobe present? no.   Bladder: 1+ trabeculation, without lesions papillary tumor seen. Ureteral orifice(s) was/were seen bilateral. Ureteral orifice(s) both sides were in the normal location and both ureteral orifices were effluxing clear urine. Complications:  None; patient tolerated the procedure well. Disposition: To home after observation. Condition: stable        Past Medical History:   Diagnosis Date    Atrial fibrillation (HCC)     Bipolar disorder ([address]     Colon polyps     Hypothyroidism     Insomnia        Past Surgical History:   Procedure Laterality Date    CARPAL TUNNEL RELEASE Right     approx 5 years ago    CLAVICLE SURGERY      x 2    COLONOSCOPY  ? 5-6 years ago        COLONOSCOPY N/A 12/19/2014    [facility]:  normal,   5y recall    COLONOSCOPY N/A 05/05/2021    Dr Ramesh Erwin, Benign Serrated Polyps (SSA -dysplasia, HP), Moderate diverticulosis, No clear-cut lesions to explain proctalgia/perianal itching, 1 year recall    COLONOSCOPY N/A 05/10/2022    Dr Ramesh Erwin, [facility] , Benign HP, Mod diverticulosis, 3 year colon recall    FOOT SURGERY      x 2    HERNIA REPAIR      JOINT REPLACEMENT Right     hip    THYROIDECTOMY      VARICOSE VEIN SURGERY      VASECTOMY         Current Outpatient Medications   Medication Sig Dispense Refill    lisinopril (PRINIVIL;ZESTRIL) 10 MG tablet TAKE 1 TABLET BY MOUTH EVERY DAY      tamsulosin (FLOMAX) 0.4 MG capsule Take 2 capsules by mouth nightly 180 capsule 3    Vitamins/Minerals TABS Take 1 tablet by mouth daily      ELIQUIS 5 MG TABS tablet TAKE 1 TABLET BY MOUTH TWICE A DAY 60 tablet 5    QUEtiapine (SEROQUEL) 50 MG tablet Take 50 mg by mouth nightly. levothyroxine (SYNTHROID) 137 MCG tablet Take 137 mcg by mouth Daily        No current facility-administered medications for this visit.        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)    Here with his significant other    Previously  and     He has 1 son and 1 daughter    Education masters degree in fine arts    Former distance runner also bicyclist not so much these days    Smoked minimally 3 to 4 years total many years ago denies alcohol consumption or substance usage       Family History   Problem Relation Age of Onset    Dementia Mother     Obesity Mother     Stroke Father     Heart Attack Father     Other Sister     Other Brother         reheumatic fever- valve issue    Colon Cancer Neg Hx     Colon Polyps Neg Hx     Esophageal Cancer Neg Hx     Liver Cancer Neg Hx     Rectal Cancer Neg Hx     Stomach Cancer Neg Hx        REVIEW OF SYSTEMS:  Review of Systems   Constitutional:  Negative for chills and fever. HENT:  Negative for facial swelling and sore throat. Eyes:  Negative for discharge and redness. Respiratory:  Negative for chest tightness and wheezing. Cardiovascular:  Negative for chest pain and palpitations. Gastrointestinal:  Negative for nausea and vomiting. Endocrine: Negative for polyphagia and polyuria. Genitourinary:  Negative for decreased urine volume, difficulty urinating, dysuria, enuresis, flank pain, frequency, genital sores, hematuria, penile discharge, penile pain, penile swelling, scrotal swelling, testicular pain and urgency. Musculoskeletal:  Negative for back pain and neck stiffness.    Skin: Negative for rash and wound. Neurological:  Negative for dizziness and headaches. Hematological:  Negative for adenopathy. Does not bruise/bleed easily. Psychiatric/Behavioral:  Negative for confusion and hallucinations. PHYSICAL EXAM:  Temp 97.3 °F (36.3 °C) (Temporal)   Ht 5' 9\" (1.753 m)   Wt 166 lb (75.3 kg)   BMI 24.51 kg/m²   Physical Exam  Constitutional:       General: He is not in acute distress. Appearance: Normal appearance. He is well-developed. HENT:      Head: Normocephalic and atraumatic. Nose: Nose normal.   Eyes:      General: No scleral icterus. Conjunctiva/sclera: Conjunctivae normal.      Pupils: Pupils are equal, round, and reactive to light. Neck:      Trachea: No tracheal deviation. Cardiovascular:      Rate and Rhythm: Normal rate and regular rhythm. Pulses: Normal pulses. Pulmonary:      Effort: Pulmonary effort is normal. No respiratory distress. Breath sounds: No stridor. Abdominal:      General: There is no distension. Palpations: Abdomen is soft. There is no mass. Tenderness: There is no abdominal tenderness. Genitourinary:     Prostate: Enlarged (40 g flat nonsuspicious). No nodules present. Rectum: Abnormal anal tone (Narrowed anus). Musculoskeletal:         General: No tenderness or deformity. Normal range of motion. Cervical back: Normal range of motion and neck supple. Lymphadenopathy:      Cervical: No cervical adenopathy. Skin:     General: Skin is warm and dry. Findings: No erythema. Neurological:      General: No focal deficit present. Mental Status: He is alert and oriented to person, place, and time.    Psychiatric:         Behavior: Behavior normal.         Judgment: Judgment normal.           DATA:  CMP:    Lab Results   Component Value Date/Time     06/06/2020 10:40 AM     06/03/2011 09:35 AM    K 4.4 06/06/2020 10:40 AM    K 4.1 06/03/2011 09:35 AM     06/06/2020 10:40 AM     06/03/2011 09:35 AM    CO2 26 06/06/2020 10:40 AM    BUN 11 06/06/2020 10:40 AM    CREATININE 0.6 06/06/2020 10:40 AM    CREATININE 0.9 06/03/2011 09:35 AM    LABGLOM >60 06/06/2020 10:40 AM    GLUCOSE 105 06/06/2020 10:40 AM    PROT 6.3 06/06/2020 10:40 AM    PROT 7.0 06/03/2011 09:35 AM    LABALBU 4.4 06/06/2020 10:40 AM    LABALBU 4.6 06/03/2011 09:35 AM    CALCIUM 8.1 06/06/2020 10:40 AM    BILITOT 0.9 06/06/2020 10:40 AM    ALKPHOS 52 06/06/2020 10:40 AM    ALKPHOS 54 06/03/2011 09:35 AM    AST 14 06/06/2020 10:40 AM    ALT 11 06/06/2020 10:40 AM     Results for orders placed or performed in visit on 10/17/22   POCT Urinalysis No Micro (Auto)   Result Value Ref Range    Color, UA yellow     Clarity, UA clear     Glucose, UA POC -     Bilirubin, UA -     Ketones, UA -     Spec Grav, UA 1.010     Blood, UA POC trace     pH, UA 6.5     Protein, UA POC -     Urobilinogen, UA 0.2     Leukocytes, UA -     Nitrite, UA -      Lab Results   Component Value Date    PSA 0.26 02/17/2020     No results found for: PSAFREEPCT    PSA done outside lab on 8/26/2022 was 0.2      1. Benign prostatic hyperplasia with weak urinary stream  He is failed medical therapy with no improvement despite maximum dose of tamsulosin. Cystoscopy today shows mild to moderate anterior lateral lobe enlargement no median lobe. Discussed options of Rezum/GLAP/TURP comparing contrast each 1 as well as expected outcomes risks and complications. In shared decision-making patient like to proceed with GLAP. He does understand this will be an outpatient procedure and the plan is for him to go home with Cortes catheter. We also discussed the risk of bleeding infection bladder neck contracture urethral stricture need for recurrent or repeat procedure for regrowth or residual adenoma. Failure to improve symptoms and need continued medical treatment.   We also discussed the risk of incontinence, and  sexual dysfunction which she says is not an issue.  - OR CYSTOURETHROSCOPY  - OR Measure, post-void residual, US, non-imaging  - POCT Urinalysis No Micro (Auto)      Orders Placed This Encounter   Procedures    POCT Urinalysis No Micro (Auto)    OR Measure, post-void residual, US, non-imaging     Pvr 38ml        Return for PT to be scheduled for Surgery. All information inputted into the note by the MA to include chief complaint, past medical history, past surgical history, medications, allergies, social and family history and review of systems has been reviewed and updated as needed by me. EMR Dragon/transcription disclaimer: Much of this documentt is electronic  transcription/translation of spoken language to printed text. The  electronic translation of spoken language may be erroneous, or at times,  nonsensical words or phrases may be inadvertently transcribed.  Although I  have reviewed the document for such errors, some may still exist.

## 2022-10-18 ENCOUNTER — TELEPHONE (OUTPATIENT)
Dept: CARDIOLOGY | Facility: CLINIC | Age: 72
End: 2022-10-18

## 2022-10-18 NOTE — TELEPHONE ENCOUNTER
RECEIVED A REQUEST FOR PT TO HOLD ELIQUIS 2-7 DAYS PRIOR TO HIS UPCOMING PROSTATE ABLATION. THIS IS SCHEDULED FOR 11/9/2022    PLEASE ADVISE

## 2022-10-28 ENCOUNTER — TELEPHONE (OUTPATIENT)
Dept: UROLOGY | Age: 72
End: 2022-10-28

## 2022-10-28 NOTE — TELEPHONE ENCOUNTER
Notified [facility] ( [phone]) of scheduled greenlight sx on 11/9/22. Time to be determined. Confirmation# [id number].

## 2022-10-31 ENCOUNTER — TELEPHONE (OUTPATIENT)
Dept: UROLOGY | Age: 72
End: 2022-10-31

## 2022-10-31 NOTE — TELEPHONE ENCOUNTER
Pt. Is scheduled for a surgery 11/9 with Dr. Shannon Farley. Pt. Was covid positive 10/23 and wanting to know if that will effect anything. Pt. Hasn't had a fever for 4 or 5 days, head congestion and tired. Please advise pt.

## 2022-10-31 NOTE — TELEPHONE ENCOUNTER
Per hospital policy he will have to wait 28 days from date positive for sx. It will be the week of Nov 21st he is okay to get rescheduled will have to come in for H&P visit prior, please sx h&p visit. I will let rep and sx scheduling know.

## 2022-11-08 ENCOUNTER — PREP FOR SURGERY (OUTPATIENT)
Dept: OTHER | Facility: HOSPITAL | Age: 72
End: 2022-11-08

## 2022-11-08 ENCOUNTER — OFFICE VISIT (OUTPATIENT)
Dept: CARDIOLOGY | Facility: CLINIC | Age: 72
End: 2022-11-08

## 2022-11-08 ENCOUNTER — LAB (OUTPATIENT)
Dept: LAB | Facility: HOSPITAL | Age: 72
End: 2022-11-08

## 2022-11-08 VITALS
HEART RATE: 60 BPM | RESPIRATION RATE: 18 BRPM | DIASTOLIC BLOOD PRESSURE: 52 MMHG | BODY MASS INDEX: 24.88 KG/M2 | WEIGHT: 168 LBS | OXYGEN SATURATION: 98 % | SYSTOLIC BLOOD PRESSURE: 120 MMHG | HEIGHT: 69 IN

## 2022-11-08 DIAGNOSIS — I48.0 PAF (PAROXYSMAL ATRIAL FIBRILLATION): Primary | ICD-10-CM

## 2022-11-08 DIAGNOSIS — I48.0 PAF (PAROXYSMAL ATRIAL FIBRILLATION): Chronic | ICD-10-CM

## 2022-11-08 DIAGNOSIS — I48.0 PAF (PAROXYSMAL ATRIAL FIBRILLATION): Primary | Chronic | ICD-10-CM

## 2022-11-08 DIAGNOSIS — I47.1 PAROXYSMAL SVT (SUPRAVENTRICULAR TACHYCARDIA): ICD-10-CM

## 2022-11-08 DIAGNOSIS — I47.29 NSVT (NONSUSTAINED VENTRICULAR TACHYCARDIA): ICD-10-CM

## 2022-11-08 LAB
ALBUMIN SERPL-MCNC: 4.2 G/DL (ref 3.5–5.2)
ALBUMIN/GLOB SERPL: 1.9 G/DL
ALP SERPL-CCNC: 58 U/L (ref 39–117)
ALT SERPL W P-5'-P-CCNC: 13 U/L (ref 1–41)
ANION GAP SERPL CALCULATED.3IONS-SCNC: 7 MMOL/L (ref 5–15)
AST SERPL-CCNC: 18 U/L (ref 1–40)
BILIRUB SERPL-MCNC: 1 MG/DL (ref 0–1.2)
BUN SERPL-MCNC: 14 MG/DL (ref 8–23)
BUN/CREAT SERPL: 23 (ref 7–25)
CALCIUM SPEC-SCNC: 8 MG/DL (ref 8.6–10.5)
CHLORIDE SERPL-SCNC: 98 MMOL/L (ref 98–107)
CO2 SERPL-SCNC: 28 MMOL/L (ref 22–29)
CREAT SERPL-MCNC: 0.61 MG/DL (ref 0.76–1.27)
DEPRECATED RDW RBC AUTO: 42.8 FL (ref 37–54)
EGFRCR SERPLBLD CKD-EPI 2021: 101.4 ML/MIN/1.73
ERYTHROCYTE [DISTWIDTH] IN BLOOD BY AUTOMATED COUNT: 12.3 % (ref 12.3–15.4)
GLOBULIN UR ELPH-MCNC: 2.2 GM/DL
GLUCOSE SERPL-MCNC: 123 MG/DL (ref 65–99)
HCT VFR BLD AUTO: 36.1 % (ref 37.5–51)
HGB BLD-MCNC: 12.3 G/DL (ref 13–17.7)
MCH RBC QN AUTO: 31.9 PG (ref 26.6–33)
MCHC RBC AUTO-ENTMCNC: 34.1 G/DL (ref 31.5–35.7)
MCV RBC AUTO: 93.8 FL (ref 79–97)
PLATELET # BLD AUTO: 226 10*3/MM3 (ref 140–450)
PMV BLD AUTO: 10.2 FL (ref 6–12)
POTASSIUM SERPL-SCNC: 3.9 MMOL/L (ref 3.5–5.2)
PROT SERPL-MCNC: 6.4 G/DL (ref 6–8.5)
RBC # BLD AUTO: 3.85 10*6/MM3 (ref 4.14–5.8)
SODIUM SERPL-SCNC: 133 MMOL/L (ref 136–145)
WBC NRBC COR # BLD: 5.23 10*3/MM3 (ref 3.4–10.8)

## 2022-11-08 PROCEDURE — 84443 ASSAY THYROID STIM HORMONE: CPT

## 2022-11-08 PROCEDURE — 99205 OFFICE O/P NEW HI 60 MIN: CPT | Performed by: STUDENT IN AN ORGANIZED HEALTH CARE EDUCATION/TRAINING PROGRAM

## 2022-11-08 PROCEDURE — 36415 COLL VENOUS BLD VENIPUNCTURE: CPT

## 2022-11-08 PROCEDURE — 93000 ELECTROCARDIOGRAM COMPLETE: CPT | Performed by: STUDENT IN AN ORGANIZED HEALTH CARE EDUCATION/TRAINING PROGRAM

## 2022-11-08 PROCEDURE — 84479 ASSAY OF THYROID (T3 OR T4): CPT

## 2022-11-08 PROCEDURE — 84436 ASSAY OF TOTAL THYROXINE: CPT

## 2022-11-08 PROCEDURE — 80053 COMPREHEN METABOLIC PANEL: CPT

## 2022-11-08 PROCEDURE — 85027 COMPLETE CBC AUTOMATED: CPT

## 2022-11-08 RX ORDER — SODIUM CHLORIDE 0.9 % (FLUSH) 0.9 %
10 SYRINGE (ML) INJECTION AS NEEDED
Status: CANCELLED | OUTPATIENT
Start: 2022-11-08

## 2022-11-08 RX ORDER — SODIUM CHLORIDE 0.9 % (FLUSH) 0.9 %
10 SYRINGE (ML) INJECTION EVERY 12 HOURS SCHEDULED
Status: CANCELLED | OUTPATIENT
Start: 2022-11-08

## 2022-11-08 NOTE — PATIENT INSTRUCTIONS
Schedule for ablation on Dec 5th  Follow up in my clinic on Dec 20th  Don't stop your blood thinner for the procedure

## 2022-11-08 NOTE — PROGRESS NOTES
"Chief Complaint  Atrial Fibrillation (NP - Holter 10/2022/)    Subjective        History of Present Illness    EP Problems:  1.  Paroxysmal atrial fibrillation  -Previously on dronedarone, poorly tolerated  2.  Paroxysmal SVT  3.  NSVT  4.  Frequent APCs  -Holter monitor with 5% burden    Cardiology Problems:  1.  Hypertension    Medical Problems:  1.  Hypothyroidism    David Schulz is a 72 y.o. male with problem list as above who presents to the clinic for evaluation of paroxysmal atrial fibrillation and paroxysmal SVT.  He has intermittent episodes with his atrial fibrillation including fatigue, shortness of breath, decreased exertional tolerance.  He states that it is hard to know which days will be good and wishes will be bad.  He has been taking his oral anticoagulation and tolerating well.  He was previously on dronedarone but did not tolerate this medication well and found it ineffective.  Given this it was stopped.    Objective   Vital Signs:  /52 (BP Location: Right arm, Patient Position: Sitting)   Pulse 60   Resp 18   Ht 175.3 cm (69\")   Wt 76.2 kg (168 lb)   SpO2 98%   BMI 24.81 kg/m²   Estimated body mass index is 24.81 kg/m² as calculated from the following:    Height as of this encounter: 175.3 cm (69\").    Weight as of this encounter: 76.2 kg (168 lb).      Physical Exam  Vitals reviewed.   Constitutional:       Appearance: Normal appearance.   HENT:      Head: Normocephalic and atraumatic.   Eyes:      Extraocular Movements: Extraocular movements intact.      Conjunctiva/sclera: Conjunctivae normal.   Cardiovascular:      Rate and Rhythm: Normal rate and regular rhythm.      Pulses: Normal pulses.      Heart sounds: Normal heart sounds.   Pulmonary:      Effort: Pulmonary effort is normal.      Breath sounds: Normal breath sounds.   Musculoskeletal:         General: No swelling.   Neurological:      General: No focal deficit present.      Mental Status: He is alert and oriented to " person, place, and time.   Psychiatric:         Mood and Affect: Mood normal.         Judgment: Judgment normal.        Result Review :  The following data was reviewed by: Domi Krishnamurthy MD on 11/08/2022:    Prior ECG previously performed by Becka Chirinos on 8/26/2022 was directly visualized and independently interpreted with the following findings: Sinus rhythm, incomplete right bundle branch block      ECG 12 Lead    Date/Time: 11/8/2022 1:58 PM  Performed by: Domi Krishnamurthy MD  Authorized by: Domi Krishnamurthy MD   Comparison: compared with previous ECG from 8/26/2022  Similar to previous ECG  Rhythm: sinus rhythm  Rate: normal  Conduction: incomplete right bundle branch block  Other: no other findings    Clinical impression: normal ECG                Assessment and Plan   Diagnoses and all orders for this visit:    1. PAF (paroxysmal atrial fibrillation) (HCC) (Primary)  -     Comprehensive Metabolic Panel; Future  -     CBC (No Diff); Future  -     Thyroid Panel With TSH; Future    2. Paroxysmal SVT (supraventricular tachycardia) (HCC)    3. NSVT (nonsustained ventricular tachycardia)        David Schulz is a 72 y.o. male with problem list as above who presents to the clinic for evaluation of paroxysmal atrial fibrillation.  They have findings consistent with symptomatic, drug refractory atrial fibrillation.  As such, we discussed the available treatment options with him including further medication management versus ablation.  At this time, he would prefer not to try another anti-arrhythmic drug and would rather proceed directly to an ablation for more definitive treatment of atrial fibrillation.  As per the 2014 Atrial fibrillation guidelines, AF catheter ablation is useful for symptomatic paroxysmal AF refractory or intolerant to at least 1 class I or III antiarrhythmic medication when a rhythm-control strategy is desired (Class I indication).  As such, I have discussed risks, benefits, and alternatives of  an electrophysiology study and ablation for atrial fibrillation with the patient.  Alternatives discussed include continued observation and medical management.  An electrophysiology study with ablation is an inherently high risk procedure with possible complications that include but are not limited to vascular access complications, internal bleeding, tamponade requiring pericardiocentesis or cardiac surgery, stroke, MI, embolism, myocardial injury, injury to the normal conduction system requiring a pacemaker, pulmonary vein stenosis, atrio-esophageal fistula, and ultimately death.  We also discussed that given the nature of the procedure, therapeutic efficacy can be variable.  Possibilities of recurrent arrhythmias and the possible need for additional procedures and/or medical therapy was discussed. Questions asked were appropriately answered.  No guarantees were made or implied.   Despite this, they would still like to proceed.    Plan:  -Schedule for atrial fibrillation ablation  -No medication changes at this time  - Will discuss the treatment plan with Becka Chirinos and Dr. Giovana CHRISTIANSON Risk: High (high risk procedure decision as above)         Follow Up   Return in about 6 weeks (around 12/20/2022).  Patient was given instructions and counseling regarding his condition or for health maintenance advice. Please see specific information pulled into the AVS if appropriate.     Part of this note may be an electronic transcription/translation of spoken language to printed text using the Dragon Dictation System.

## 2022-11-09 LAB
T-UPTAKE NFR SERPL: 0.95 TBI (ref 0.8–1.3)
T4 SERPL-MCNC: 6.98 MCG/DL (ref 4.5–11.7)
TSH SERPL DL<=0.05 MIU/L-ACNC: 0.49 UIU/ML (ref 0.27–4.2)

## 2022-12-05 ENCOUNTER — HOSPITAL ENCOUNTER (OUTPATIENT)
Facility: HOSPITAL | Age: 72
Setting detail: HOSPITAL OUTPATIENT SURGERY
Discharge: HOME | End: 2022-12-05
Attending: STUDENT IN AN ORGANIZED HEALTH CARE EDUCATION/TRAINING PROGRAM | Admitting: STUDENT IN AN ORGANIZED HEALTH CARE EDUCATION/TRAINING PROGRAM

## 2022-12-05 ENCOUNTER — ANESTHESIA (OUTPATIENT)
Dept: CARDIOLOGY | Facility: HOSPITAL | Age: 72
End: 2022-12-05

## 2022-12-05 ENCOUNTER — ANESTHESIA EVENT (OUTPATIENT)
Dept: CARDIOLOGY | Facility: HOSPITAL | Age: 72
End: 2022-12-05

## 2022-12-05 VITALS
OXYGEN SATURATION: 99 % | BODY MASS INDEX: 24.32 KG/M2 | HEIGHT: 69 IN | SYSTOLIC BLOOD PRESSURE: 113 MMHG | RESPIRATION RATE: 16 BRPM | TEMPERATURE: 98.3 F | WEIGHT: 164.2 LBS | HEART RATE: 62 BPM | DIASTOLIC BLOOD PRESSURE: 67 MMHG

## 2022-12-05 DIAGNOSIS — N13.8 BPH WITH URINARY OBSTRUCTION: ICD-10-CM

## 2022-12-05 DIAGNOSIS — I48.0 PAF (PAROXYSMAL ATRIAL FIBRILLATION): ICD-10-CM

## 2022-12-05 DIAGNOSIS — N40.1 BPH WITH URINARY OBSTRUCTION: ICD-10-CM

## 2022-12-05 LAB
ACT BLD: 299 SECONDS (ref 82–152)
ACT BLD: 378 SECONDS (ref 82–152)
ACT BLD: 414 SECONDS (ref 82–152)
ANION GAP SERPL CALCULATED.3IONS-SCNC: 9 MMOL/L (ref 5–15)
BASOPHILS # BLD AUTO: 0.03 10*3/MM3 (ref 0–0.2)
BASOPHILS NFR BLD AUTO: 1 % (ref 0–1.5)
BUN SERPL-MCNC: 13 MG/DL (ref 8–23)
BUN/CREAT SERPL: 18.8 (ref 7–25)
CALCIUM SPEC-SCNC: 8.7 MG/DL (ref 8.6–10.5)
CHLORIDE SERPL-SCNC: 102 MMOL/L (ref 98–107)
CO2 SERPL-SCNC: 29 MMOL/L (ref 22–29)
CREAT SERPL-MCNC: 0.69 MG/DL (ref 0.76–1.27)
DEPRECATED RDW RBC AUTO: 45.1 FL (ref 37–54)
EGFRCR SERPLBLD CKD-EPI 2021: 97.7 ML/MIN/1.73
EOSINOPHIL # BLD AUTO: 0.14 10*3/MM3 (ref 0–0.4)
EOSINOPHIL NFR BLD AUTO: 4.6 % (ref 0.3–6.2)
ERYTHROCYTE [DISTWIDTH] IN BLOOD BY AUTOMATED COUNT: 12.7 % (ref 12.3–15.4)
GLUCOSE SERPL-MCNC: 103 MG/DL (ref 65–99)
HCT VFR BLD AUTO: 39.9 % (ref 37.5–51)
HGB BLD-MCNC: 13.1 G/DL (ref 13–17.7)
IMM GRANULOCYTES # BLD AUTO: 0.01 10*3/MM3 (ref 0–0.05)
IMM GRANULOCYTES NFR BLD AUTO: 0.3 % (ref 0–0.5)
INR PPP: 1.09 (ref 0.91–1.09)
LYMPHOCYTES # BLD AUTO: 0.72 10*3/MM3 (ref 0.7–3.1)
LYMPHOCYTES NFR BLD AUTO: 23.6 % (ref 19.6–45.3)
MCH RBC QN AUTO: 31.4 PG (ref 26.6–33)
MCHC RBC AUTO-ENTMCNC: 32.8 G/DL (ref 31.5–35.7)
MCV RBC AUTO: 95.7 FL (ref 79–97)
MONOCYTES # BLD AUTO: 0.37 10*3/MM3 (ref 0.1–0.9)
MONOCYTES NFR BLD AUTO: 12.1 % (ref 5–12)
NEUTROPHILS NFR BLD AUTO: 1.78 10*3/MM3 (ref 1.7–7)
NEUTROPHILS NFR BLD AUTO: 58.4 % (ref 42.7–76)
NRBC BLD AUTO-RTO: 0 /100 WBC (ref 0–0.2)
PLATELET # BLD AUTO: 206 10*3/MM3 (ref 140–450)
PMV BLD AUTO: 10 FL (ref 6–12)
POTASSIUM SERPL-SCNC: 3.8 MMOL/L (ref 3.5–5.2)
PROTHROMBIN TIME: 14.2 SECONDS (ref 11.8–14.8)
RBC # BLD AUTO: 4.17 10*6/MM3 (ref 4.14–5.8)
SODIUM SERPL-SCNC: 140 MMOL/L (ref 136–145)
WBC NRBC COR # BLD: 3.05 10*3/MM3 (ref 3.4–10.8)

## 2022-12-05 PROCEDURE — C1732 CATH, EP, DIAG/ABL, 3D/VECT: HCPCS | Performed by: STUDENT IN AN ORGANIZED HEALTH CARE EDUCATION/TRAINING PROGRAM

## 2022-12-05 PROCEDURE — C1894 INTRO/SHEATH, NON-LASER: HCPCS | Performed by: STUDENT IN AN ORGANIZED HEALTH CARE EDUCATION/TRAINING PROGRAM

## 2022-12-05 PROCEDURE — 85610 PROTHROMBIN TIME: CPT | Performed by: STUDENT IN AN ORGANIZED HEALTH CARE EDUCATION/TRAINING PROGRAM

## 2022-12-05 PROCEDURE — 93622 COMP EP EVAL L VENTR PAC&REC: CPT | Performed by: STUDENT IN AN ORGANIZED HEALTH CARE EDUCATION/TRAINING PROGRAM

## 2022-12-05 PROCEDURE — C1759 CATH, INTRA ECHOCARDIOGRAPHY: HCPCS | Performed by: STUDENT IN AN ORGANIZED HEALTH CARE EDUCATION/TRAINING PROGRAM

## 2022-12-05 PROCEDURE — 93005 ELECTROCARDIOGRAM TRACING: CPT | Performed by: STUDENT IN AN ORGANIZED HEALTH CARE EDUCATION/TRAINING PROGRAM

## 2022-12-05 PROCEDURE — 85347 COAGULATION TIME ACTIVATED: CPT

## 2022-12-05 PROCEDURE — C1730 CATH, EP, 19 OR FEW ELECT: HCPCS | Performed by: STUDENT IN AN ORGANIZED HEALTH CARE EDUCATION/TRAINING PROGRAM

## 2022-12-05 PROCEDURE — C1766 INTRO/SHEATH,STRBLE,NON-PEEL: HCPCS | Performed by: STUDENT IN AN ORGANIZED HEALTH CARE EDUCATION/TRAINING PROGRAM

## 2022-12-05 PROCEDURE — 93656 COMPRE EP EVAL ABLTJ ATR FIB: CPT | Performed by: STUDENT IN AN ORGANIZED HEALTH CARE EDUCATION/TRAINING PROGRAM

## 2022-12-05 PROCEDURE — 25010000002 ONDANSETRON PER 1 MG

## 2022-12-05 PROCEDURE — 25010000002 FENTANYL CITRATE (PF) 100 MCG/2ML SOLUTION

## 2022-12-05 PROCEDURE — 25010000002 PROPOFOL 10 MG/ML EMULSION

## 2022-12-05 PROCEDURE — 25010000002 HEPARIN (PORCINE) PER 1000 UNITS

## 2022-12-05 PROCEDURE — 25010000002 PROTAMINE SULFATE PER 10 MG

## 2022-12-05 PROCEDURE — 85025 COMPLETE CBC W/AUTO DIFF WBC: CPT | Performed by: STUDENT IN AN ORGANIZED HEALTH CARE EDUCATION/TRAINING PROGRAM

## 2022-12-05 PROCEDURE — C1760 CLOSURE DEV, VASC: HCPCS | Performed by: STUDENT IN AN ORGANIZED HEALTH CARE EDUCATION/TRAINING PROGRAM

## 2022-12-05 PROCEDURE — 80048 BASIC METABOLIC PNL TOTAL CA: CPT | Performed by: STUDENT IN AN ORGANIZED HEALTH CARE EDUCATION/TRAINING PROGRAM

## 2022-12-05 RX ORDER — HEPARIN SODIUM 1000 [USP'U]/ML
INJECTION, SOLUTION INTRAVENOUS; SUBCUTANEOUS AS NEEDED
Status: DISCONTINUED | OUTPATIENT
Start: 2022-12-05 | End: 2022-12-05 | Stop reason: SURG

## 2022-12-05 RX ORDER — SODIUM CHLORIDE 0.9 % (FLUSH) 0.9 %
3-10 SYRINGE (ML) INJECTION AS NEEDED
Status: CANCELLED | OUTPATIENT
Start: 2022-12-05

## 2022-12-05 RX ORDER — FENTANYL CITRATE 50 UG/ML
INJECTION, SOLUTION INTRAMUSCULAR; INTRAVENOUS AS NEEDED
Status: DISCONTINUED | OUTPATIENT
Start: 2022-12-05 | End: 2022-12-05 | Stop reason: SURG

## 2022-12-05 RX ORDER — SODIUM CHLORIDE 0.9 % (FLUSH) 0.9 %
3 SYRINGE (ML) INJECTION EVERY 12 HOURS SCHEDULED
Status: CANCELLED | OUTPATIENT
Start: 2022-12-05

## 2022-12-05 RX ORDER — PROPOFOL 10 MG/ML
VIAL (ML) INTRAVENOUS AS NEEDED
Status: DISCONTINUED | OUTPATIENT
Start: 2022-12-05 | End: 2022-12-05 | Stop reason: SURG

## 2022-12-05 RX ORDER — FENTANYL CITRATE 50 UG/ML
25 INJECTION, SOLUTION INTRAMUSCULAR; INTRAVENOUS
Status: CANCELLED | OUTPATIENT
Start: 2022-12-05

## 2022-12-05 RX ORDER — ONDANSETRON 2 MG/ML
4 INJECTION INTRAMUSCULAR; INTRAVENOUS ONCE AS NEEDED
Status: CANCELLED | OUTPATIENT
Start: 2022-12-05

## 2022-12-05 RX ORDER — LABETALOL HYDROCHLORIDE 5 MG/ML
5 INJECTION, SOLUTION INTRAVENOUS
Status: CANCELLED | OUTPATIENT
Start: 2022-12-05

## 2022-12-05 RX ORDER — NALOXONE HCL 0.4 MG/ML
0.4 VIAL (ML) INJECTION AS NEEDED
Status: CANCELLED | OUTPATIENT
Start: 2022-12-05

## 2022-12-05 RX ORDER — SODIUM CHLORIDE 9 MG/ML
INJECTION, SOLUTION INTRAVENOUS CONTINUOUS PRN
Status: DISCONTINUED | OUTPATIENT
Start: 2022-12-05 | End: 2022-12-05 | Stop reason: SURG

## 2022-12-05 RX ORDER — SODIUM CHLORIDE 9 MG/ML
40 INJECTION, SOLUTION INTRAVENOUS AS NEEDED
Status: CANCELLED | OUTPATIENT
Start: 2022-12-05

## 2022-12-05 RX ORDER — EPHEDRINE SULFATE 50 MG/ML
INJECTION, SOLUTION INTRAVENOUS AS NEEDED
Status: DISCONTINUED | OUTPATIENT
Start: 2022-12-05 | End: 2022-12-05 | Stop reason: SURG

## 2022-12-05 RX ORDER — OMEPRAZOLE 40 MG/1
40 CAPSULE, DELAYED RELEASE ORAL DAILY
Qty: 30 CAPSULE | Refills: 0 | Status: SHIPPED | OUTPATIENT
Start: 2022-12-05 | End: 2023-03-22

## 2022-12-05 RX ORDER — DROPERIDOL 2.5 MG/ML
0.62 INJECTION, SOLUTION INTRAMUSCULAR; INTRAVENOUS ONCE AS NEEDED
Status: CANCELLED | OUTPATIENT
Start: 2022-12-05

## 2022-12-05 RX ORDER — PROTAMINE SULFATE 10 MG/ML
INJECTION, SOLUTION INTRAVENOUS AS NEEDED
Status: DISCONTINUED | OUTPATIENT
Start: 2022-12-05 | End: 2022-12-05 | Stop reason: SURG

## 2022-12-05 RX ORDER — LIDOCAINE HYDROCHLORIDE 20 MG/ML
INJECTION, SOLUTION INFILTRATION; PERINEURAL
Status: DISCONTINUED | OUTPATIENT
Start: 2022-12-05 | End: 2022-12-05 | Stop reason: HOSPADM

## 2022-12-05 RX ORDER — SUCCINYLCHOLINE/SOD CL,ISO/PF 200MG/10ML
SYRINGE (ML) INTRAVENOUS AS NEEDED
Status: DISCONTINUED | OUTPATIENT
Start: 2022-12-05 | End: 2022-12-05 | Stop reason: SURG

## 2022-12-05 RX ORDER — ONDANSETRON 2 MG/ML
INJECTION INTRAMUSCULAR; INTRAVENOUS AS NEEDED
Status: DISCONTINUED | OUTPATIENT
Start: 2022-12-05 | End: 2022-12-05 | Stop reason: SURG

## 2022-12-05 RX ORDER — TAMSULOSIN HYDROCHLORIDE 0.4 MG/1
1 CAPSULE ORAL 2 TIMES DAILY
Start: 2022-12-05

## 2022-12-05 RX ORDER — ROCURONIUM BROMIDE 10 MG/ML
INJECTION, SOLUTION INTRAVENOUS AS NEEDED
Status: DISCONTINUED | OUTPATIENT
Start: 2022-12-05 | End: 2022-12-05 | Stop reason: SURG

## 2022-12-05 RX ORDER — SODIUM CHLORIDE 0.9 % (FLUSH) 0.9 %
10 SYRINGE (ML) INJECTION AS NEEDED
Status: DISCONTINUED | OUTPATIENT
Start: 2022-12-05 | End: 2022-12-05 | Stop reason: HOSPADM

## 2022-12-05 RX ORDER — OXYCODONE AND ACETAMINOPHEN 10; 325 MG/1; MG/1
1 TABLET ORAL ONCE AS NEEDED
Status: CANCELLED | OUTPATIENT
Start: 2022-12-05

## 2022-12-05 RX ORDER — LIDOCAINE HYDROCHLORIDE 10 MG/ML
0.5 INJECTION, SOLUTION EPIDURAL; INFILTRATION; INTRACAUDAL; PERINEURAL ONCE AS NEEDED
Status: CANCELLED | OUTPATIENT
Start: 2022-12-05

## 2022-12-05 RX ORDER — SODIUM CHLORIDE 0.9 % (FLUSH) 0.9 %
10 SYRINGE (ML) INJECTION EVERY 12 HOURS SCHEDULED
Status: DISCONTINUED | OUTPATIENT
Start: 2022-12-05 | End: 2022-12-05 | Stop reason: HOSPADM

## 2022-12-05 RX ORDER — LIDOCAINE HYDROCHLORIDE 20 MG/ML
INJECTION, SOLUTION EPIDURAL; INFILTRATION; INTRACAUDAL; PERINEURAL AS NEEDED
Status: DISCONTINUED | OUTPATIENT
Start: 2022-12-05 | End: 2022-12-05 | Stop reason: SURG

## 2022-12-05 RX ORDER — SODIUM CHLORIDE, SODIUM LACTATE, POTASSIUM CHLORIDE, CALCIUM CHLORIDE 600; 310; 30; 20 MG/100ML; MG/100ML; MG/100ML; MG/100ML
100 INJECTION, SOLUTION INTRAVENOUS CONTINUOUS
Status: CANCELLED | OUTPATIENT
Start: 2022-12-05

## 2022-12-05 RX ORDER — FLUMAZENIL 0.1 MG/ML
0.2 INJECTION INTRAVENOUS AS NEEDED
Status: CANCELLED | OUTPATIENT
Start: 2022-12-05

## 2022-12-05 RX ADMIN — PROPOFOL 50 MG: 10 INJECTION, EMULSION INTRAVENOUS at 08:19

## 2022-12-05 RX ADMIN — HEPARIN SODIUM 8000 UNITS: 1000 INJECTION, SOLUTION INTRAVENOUS; SUBCUTANEOUS at 08:42

## 2022-12-05 RX ADMIN — ONDANSETRON 4 MG: 2 INJECTION INTRAMUSCULAR; INTRAVENOUS at 09:09

## 2022-12-05 RX ADMIN — HEPARIN SODIUM 15000 UNITS: 1000 INJECTION, SOLUTION INTRAVENOUS; SUBCUTANEOUS at 08:25

## 2022-12-05 RX ADMIN — SODIUM CHLORIDE: 9 INJECTION, SOLUTION INTRAVENOUS at 07:42

## 2022-12-05 RX ADMIN — PROTAMINE SULFATE 50 MG: 10 INJECTION, SOLUTION INTRAVENOUS at 09:35

## 2022-12-05 RX ADMIN — ROCURONIUM BROMIDE 5 MG: 50 INJECTION INTRAVENOUS at 08:03

## 2022-12-05 RX ADMIN — PROPOFOL 125 MCG/KG/MIN: 10 INJECTION, EMULSION INTRAVENOUS at 08:03

## 2022-12-05 RX ADMIN — LIDOCAINE HYDROCHLORIDE 100 MG: 20 INJECTION, SOLUTION EPIDURAL; INFILTRATION; INTRACAUDAL; PERINEURAL at 08:03

## 2022-12-05 RX ADMIN — PROPOFOL 150 MG: 10 INJECTION, EMULSION INTRAVENOUS at 08:03

## 2022-12-05 RX ADMIN — EPHEDRINE SULFATE 10 MG: 50 INJECTION INTRAVENOUS at 08:10

## 2022-12-05 RX ADMIN — FENTANYL CITRATE 100 MCG: 50 INJECTION, SOLUTION INTRAMUSCULAR; INTRAVENOUS at 08:03

## 2022-12-05 RX ADMIN — EPHEDRINE SULFATE 10 MG: 50 INJECTION INTRAVENOUS at 08:33

## 2022-12-05 RX ADMIN — Medication 140 MG: at 08:04

## 2022-12-05 NOTE — ANESTHESIA POSTPROCEDURE EVALUATION
"Patient: David Schulz    Procedure Summary     Date: 12/05/22 Room / Location: [facility] CATH LAB 4 /  [facility] CATH INVASIVE LOCATION    Anesthesia Start: 0742 Anesthesia Stop: 1000    Procedure: EP/Ablation, atrial fibrillation Diagnosis:       PAF (paroxysmal atrial fibrillation) (HCC)      (Atrial fibrillation)    Providers: Domi Krishnamurthy MD Provider: Anna Shepard CRNA    Anesthesia Type: general ASA Status: 3          Anesthesia Type: general    Vitals  Vitals Value Taken Time   /60 12/05/22 1201   Temp     Pulse 57 12/05/22 1225   Resp 16 12/05/22 1130   SpO2 100 % 12/05/22 1225   Vitals shown include unvalidated device data.        Post Anesthesia Care and Evaluation    Patient location during evaluation: PACU  Patient participation: complete - patient participated  Level of consciousness: awake and alert  Pain management: adequate    Airway patency: patent  Anesthetic complications: No anesthetic complications    Cardiovascular status: acceptable  Respiratory status: acceptable  Hydration status: acceptable    Comments: Blood pressure 109/54, pulse 56, temperature 98.3 °F (36.8 °C), resp. rate 16, height 175.3 cm (69\"), weight 74.5 kg (164 lb 3.2 oz), SpO2 98 %.    Pt discharged from PACU based on pascual score >8      "

## 2022-12-05 NOTE — ANESTHESIA PREPROCEDURE EVALUATION
Anesthesia Evaluation     Patient summary reviewed                Airway   Mallampati: I  TM distance: >3 FB  No difficulty expected  Dental - normal exam     Pulmonary    (+) a smoker Former,   Cardiovascular     PT is on anticoagulation therapy    (+) hypertension, dysrhythmias Atrial Fib,     ROS comment: Echo:  Left ventricular ejection fraction appears to be 56 - 60%. Left ventricular systolic function is normal.  Left ventricular diastolic function was normal.  Abnormal global longitudinal LV strain (GLS) = -14.0%.  Mild aortic valve regurgitation is present.  Estimated right ventricular systolic pressure from tricuspid regurgitation is normal (<35 mmHg).         Neuro/Psych  GI/Hepatic/Renal/Endo    (+)   thyroid problem hypothyroidism    Musculoskeletal     Abdominal    Substance History      OB/GYN          Other                      Anesthesia Plan    ASA 3     general     intravenous induction     Anesthetic plan, risks, benefits, and alternatives have been provided, discussed and informed consent has been obtained with: patient.        CODE STATUS:

## 2022-12-05 NOTE — INTERVAL H&P NOTE
H&P reviewed. The patient was examined and there are no changes to the H&P.      Since the time of the last clinic visit, denies significant change in symptoms.  Denies missing any doses of his medications.  No new ER visits or hospitalizations.    Exam:  Unchanged from last clinic visit.    Labs:  Reviewed.    Assessment/plan:  David Schulz is a 72 y.o. male who presents to the hospital for outpatient EP study and ablation for paroxysmal atrial fibrillation.  There are no apparent contraindications to proceeding and he is medically appropriate for outpatient management with this procedure.      I have discussed risks, benefits, and alternatives of an electrophysiology study and ablation for atrial fibrillation with the patient.  Alternatives discussed include continued observation and medical management.  An electrophysiology study with ablation is an inherently high risk procedure with possible complications that include but are not limited to vascular access complications, internal bleeding, tamponade requiring pericardiocentesis or cardiac surgery, stroke, MI, embolism, myocardial injury, injury to the normal conduction system requiring a pacemaker, pulmonary vein stenosis, atrio-esophageal fistula, and ultimately death.  We also discussed that given the nature of the procedure, therapeutic efficacy can be variable.  Possibilities of recurrent arrhythmias and the possible need for additional procedures and/or medical therapy was discussed. Questions asked were appropriately answered.  No guarantees were made or implied.     Despite this, they would still like to proceed.    Plan:  - Proceed with EP study and ablation for atrial fibrillation

## 2022-12-05 NOTE — ANESTHESIA PROCEDURE NOTES
Airway  Urgency: elective    Date/Time: 12/5/2022 8:05 AM  Airway not difficult    General Information and Staff    Patient location during procedure: OR  CRNA/CAA: Danilo Delcid CRNA    Indications and Patient Condition  Indications for airway management: airway protection    Preoxygenated: yes  Mask difficulty assessment: 1 - vent by mask    Final Airway Details  Final airway type: endotracheal airway      Successful airway: ETT  Cuffed: yes   Successful intubation technique: video laryngoscopy  Facilitating devices/methods: intubating stylet  Endotracheal tube insertion site: oral  Blade: Jenkins  Blade size: 4  ETT size (mm): 7.5  Cormack-Lehane Classification: grade I - full view of glottis  Placement verified by: capnometry   Cuff volume (mL): 9  Measured from: lips  ETT/EBT  to lips (cm): 23  Number of attempts at approach: 1  Assessment: lips, teeth, and gum same as pre-op and atraumatic intubation

## 2022-12-05 NOTE — SIGNIFICANT NOTE
Dr. Krishnamurthy at bedside, applied large foam tape, pressure dressinig over site. Will recheck pt in 1 hour.

## 2022-12-05 NOTE — DISCHARGE INSTRUCTIONS
Post-Atrial Fibrillation Ablation Instructions    ACTIVITY    Avoid driving, operating machinery, or alcohol consumption for 24 hours after receiving sedation. In addition, avoid signing legal documents or participating in legal proceedings.    You may resume normal activities after 24 hours except for the following:    Avoid heavy lifting (no more than 10 pounds) and vigorous activities for a minimum of 7 days. This includes activities such as jogging, exercise classes, sports activities, etc.    You may resume walking and other normal activities.    Avoid sitting more than 2 consecutive hours during waking hours for the first 3 days. If you are traveling, stop for brief (5 minutes) walks every two hours.    MEDICATIONS    Continue Coumadin, Eliquis, Pradaxa, or Xarelto at your usual dose this evening. Do not stop this medication without consulting with your cardiologist.    If you are on Coumadin have your PT/INR checked in 7-10 days.     Antacid: You will need to take an antacid for 30 days following your procedure.  This will be prescribed to you and sent to your pharmacy.  The antacid is called omeprazole.  Please take it as prescribed for 30 days and then stop.    MEDICAL FOLLOW UP    See Dr. Krishnamurthy as scheduled on 12/20/22.    PLEASE NOTIFY US IF YOU DEVELOP    Fever of 101 or greater during your first few days at home    The occurrence of a new, persistent cough or unexplained shortness of breath.    A groin bruise may be expected. Initial soreness and discomfort should improve over the first few days. If you continue to have discomfort or if bruising is excessive, please call us.    Patients often experience palpitations and even atrial fibrillation during the healing period.    Please call if you have an episode of atrial fibrillation accompanied by fast heart rate greater than 120 beats per minute for extended period or Atrial Fibrillation that last longer than 1-2 days.    Mild chest soreness is common and  may be treated with Tylenol, Advil, or Motrin.  This soreness typically worsens when taking deep breaths.  If you notice these symptoms, start one of these medications according to the package directions and continue for 2-3 days. If your symptoms are not relieved or become severe, please call us.      REASONS TO GO TO THE EMERGENCY ROOM FOR EVALUATION:   Severe chest pain  Significant shortness of breath at rest  Near passing out or passing out episodes soon after your ablation  Symptoms of chest pain or shortness of breath associated with low blood pressure (reading less than 90 for the top number / systolic blood pressure)  Brisk bleeding or significant swelling from the groin where IVs were placed  Any concerns that an emergent or life threatening complication is occurring    If you have a clinical questions between the hours of 8am and 4pm, Monday - Friday please call the office and let us know your concern. Please leave a message if your call is not an emergency.    For emergencies, please go for evaluation at your nearest emergency department.    If you have a [employer] account, this an excellent way to communicate.  [employer] messages are checked Monday through Friday. Please allow 24-36 hours for your message to be answered.

## 2022-12-05 NOTE — SIGNIFICANT NOTE
1520  asked Dr. Krishnamurthy ok with removing right groin dressing.  Dressing saturated. Constant ooze from one of puncture sites.     Pressure applied x 10 mins.     Ooze continues. Went back to cath lab, to report to Dr. Krishnamurthy.  Rad tech came to bedside, applied pressure, hemaclot to right site.  Then applied safeguard at 1445

## 2022-12-07 ENCOUNTER — TELEPHONE (OUTPATIENT)
Dept: CARDIOLOGY | Facility: CLINIC | Age: 72
End: 2022-12-07

## 2022-12-07 DIAGNOSIS — T14.8XXA HEMATOMA: Primary | ICD-10-CM

## 2022-12-07 DIAGNOSIS — L76.22 POSTPROCEDURAL HEMORRHAGE OF SKIN AND SUBCUTANEOUS TISSUE FOLLOWING OTHER PROCEDURE: ICD-10-CM

## 2022-12-07 NOTE — TELEPHONE ENCOUNTER
Patient called in and LM stating he spoke with Dr Krishnamurthy yesterday about an US.     Spoke with Dr Krishnamurthy, he is placing order now.    LM for patient to call back regarding US.

## 2022-12-09 ENCOUNTER — TELEPHONE (OUTPATIENT)
Dept: CARDIOLOGY | Facility: CLINIC | Age: 72
End: 2022-12-09

## 2022-12-09 ENCOUNTER — HOSPITAL ENCOUNTER (OUTPATIENT)
Dept: ULTRASOUND IMAGING | Facility: HOSPITAL | Age: 72
Discharge: HOME | End: 2022-12-09
Admitting: STUDENT IN AN ORGANIZED HEALTH CARE EDUCATION/TRAINING PROGRAM

## 2022-12-09 DIAGNOSIS — T14.8XXA HEMATOMA: ICD-10-CM

## 2022-12-09 DIAGNOSIS — L76.22 POSTPROCEDURAL HEMORRHAGE OF SKIN AND SUBCUTANEOUS TISSUE FOLLOWING OTHER PROCEDURE: ICD-10-CM

## 2022-12-09 PROCEDURE — 93926 LOWER EXTREMITY STUDY: CPT

## 2022-12-09 NOTE — TELEPHONE ENCOUNTER
Patient called in stating he still has a bulge that is blood filled with a dark red surface. Patient wondering if he should be concerned?     Please advise.

## 2022-12-09 NOTE — TELEPHONE ENCOUNTER
No need to be concerned unless it is expanding in size or becoming more painful.  It may take several weeks for the area to resolve.    Thanks,  Domi

## 2022-12-10 ENCOUNTER — HOSPITAL ENCOUNTER (EMERGENCY)
Facility: HOSPITAL | Age: 72
Discharge: HOME | End: 2022-12-10
Attending: STUDENT IN AN ORGANIZED HEALTH CARE EDUCATION/TRAINING PROGRAM | Admitting: STUDENT IN AN ORGANIZED HEALTH CARE EDUCATION/TRAINING PROGRAM

## 2022-12-10 ENCOUNTER — APPOINTMENT (OUTPATIENT)
Dept: GENERAL RADIOLOGY | Facility: HOSPITAL | Age: 72
End: 2022-12-10

## 2022-12-10 ENCOUNTER — APPOINTMENT (OUTPATIENT)
Dept: CT IMAGING | Facility: HOSPITAL | Age: 72
End: 2022-12-10

## 2022-12-10 DIAGNOSIS — R77.8 ELEVATED TROPONIN: ICD-10-CM

## 2022-12-10 DIAGNOSIS — U07.1 COVID-19: Primary | ICD-10-CM

## 2022-12-10 LAB
ALBUMIN SERPL-MCNC: 5 G/DL (ref 3.5–5.2)
ALBUMIN/GLOB SERPL: 2 G/DL
ALP SERPL-CCNC: 62 U/L (ref 39–117)
ALT SERPL W P-5'-P-CCNC: 16 U/L (ref 1–41)
ANION GAP SERPL CALCULATED.3IONS-SCNC: 10 MMOL/L (ref 5–15)
APTT PPP: 32.6 SECONDS (ref 24.1–35)
AST SERPL-CCNC: 20 U/L (ref 1–40)
BASOPHILS # BLD AUTO: 0.03 10*3/MM3 (ref 0–0.2)
BASOPHILS NFR BLD AUTO: 0.7 % (ref 0–1.5)
BILIRUB SERPL-MCNC: 0.6 MG/DL (ref 0–1.2)
BUN SERPL-MCNC: 10 MG/DL (ref 8–23)
BUN/CREAT SERPL: 15.9 (ref 7–25)
CALCIUM SPEC-SCNC: 8.5 MG/DL (ref 8.6–10.5)
CHLORIDE SERPL-SCNC: 102 MMOL/L (ref 98–107)
CK SERPL-CCNC: 101 U/L (ref 20–200)
CO2 SERPL-SCNC: 29 MMOL/L (ref 22–29)
CREAT SERPL-MCNC: 0.63 MG/DL (ref 0.76–1.27)
DEPRECATED RDW RBC AUTO: 44.3 FL (ref 37–54)
EGFRCR SERPLBLD CKD-EPI 2021: 100.4 ML/MIN/1.73
EOSINOPHIL # BLD AUTO: 0.12 10*3/MM3 (ref 0–0.4)
EOSINOPHIL NFR BLD AUTO: 3 % (ref 0.3–6.2)
ERYTHROCYTE [DISTWIDTH] IN BLOOD BY AUTOMATED COUNT: 12.6 % (ref 12.3–15.4)
FLUAV RNA RESP QL NAA+PROBE: NOT DETECTED
FLUBV RNA RESP QL NAA+PROBE: NOT DETECTED
GLOBULIN UR ELPH-MCNC: 2.5 GM/DL
GLUCOSE SERPL-MCNC: 110 MG/DL (ref 65–99)
HCT VFR BLD AUTO: 39.6 % (ref 37.5–51)
HGB BLD-MCNC: 13.4 G/DL (ref 13–17.7)
HOLD SPECIMEN: NORMAL
IMM GRANULOCYTES # BLD AUTO: 0.01 10*3/MM3 (ref 0–0.05)
IMM GRANULOCYTES NFR BLD AUTO: 0.2 % (ref 0–0.5)
INR PPP: 0.95 (ref 0.91–1.09)
LYMPHOCYTES # BLD AUTO: 0.87 10*3/MM3 (ref 0.7–3.1)
LYMPHOCYTES NFR BLD AUTO: 21.7 % (ref 19.6–45.3)
MCH RBC QN AUTO: 32.4 PG (ref 26.6–33)
MCHC RBC AUTO-ENTMCNC: 33.8 G/DL (ref 31.5–35.7)
MCV RBC AUTO: 95.7 FL (ref 79–97)
MONOCYTES # BLD AUTO: 0.41 10*3/MM3 (ref 0.1–0.9)
MONOCYTES NFR BLD AUTO: 10.2 % (ref 5–12)
NEUTROPHILS NFR BLD AUTO: 2.57 10*3/MM3 (ref 1.7–7)
NEUTROPHILS NFR BLD AUTO: 64.2 % (ref 42.7–76)
NRBC BLD AUTO-RTO: 0 /100 WBC (ref 0–0.2)
NT-PROBNP SERPL-MCNC: 156.2 PG/ML (ref 0–900)
PLATELET # BLD AUTO: 246 10*3/MM3 (ref 140–450)
PMV BLD AUTO: 9.9 FL (ref 6–12)
POTASSIUM SERPL-SCNC: 4.1 MMOL/L (ref 3.5–5.2)
PROT SERPL-MCNC: 7.5 G/DL (ref 6–8.5)
PROTHROMBIN TIME: 12.8 SECONDS (ref 11.8–14.8)
RBC # BLD AUTO: 4.14 10*6/MM3 (ref 4.14–5.8)
RSV RNA NPH QL NAA+NON-PROBE: NOT DETECTED
SARS-COV-2 RNA RESP QL NAA+PROBE: DETECTED
SODIUM SERPL-SCNC: 141 MMOL/L (ref 136–145)
TROPONIN T SERPL-MCNC: 0.08 NG/ML (ref 0–0.03)
TROPONIN T SERPL-MCNC: 0.09 NG/ML (ref 0–0.03)
WBC NRBC COR # BLD: 4.01 10*3/MM3 (ref 3.4–10.8)
WHOLE BLOOD HOLD COAG: NORMAL
WHOLE BLOOD HOLD SPECIMEN: NORMAL

## 2022-12-10 PROCEDURE — 71045 X-RAY EXAM CHEST 1 VIEW: CPT

## 2022-12-10 PROCEDURE — 75635 CT ANGIO ABDOMINAL ARTERIES: CPT

## 2022-12-10 PROCEDURE — 93010 ELECTROCARDIOGRAM REPORT: CPT | Performed by: INTERNAL MEDICINE

## 2022-12-10 PROCEDURE — 85730 THROMBOPLASTIN TIME PARTIAL: CPT | Performed by: STUDENT IN AN ORGANIZED HEALTH CARE EDUCATION/TRAINING PROGRAM

## 2022-12-10 PROCEDURE — 0 IOPAMIDOL PER 1 ML: Performed by: STUDENT IN AN ORGANIZED HEALTH CARE EDUCATION/TRAINING PROGRAM

## 2022-12-10 PROCEDURE — 85610 PROTHROMBIN TIME: CPT | Performed by: STUDENT IN AN ORGANIZED HEALTH CARE EDUCATION/TRAINING PROGRAM

## 2022-12-10 PROCEDURE — 87637 SARSCOV2&INF A&B&RSV AMP PRB: CPT | Performed by: STUDENT IN AN ORGANIZED HEALTH CARE EDUCATION/TRAINING PROGRAM

## 2022-12-10 PROCEDURE — 99284 EMERGENCY DEPT VISIT MOD MDM: CPT

## 2022-12-10 PROCEDURE — 83880 ASSAY OF NATRIURETIC PEPTIDE: CPT | Performed by: STUDENT IN AN ORGANIZED HEALTH CARE EDUCATION/TRAINING PROGRAM

## 2022-12-10 PROCEDURE — 85025 COMPLETE CBC W/AUTO DIFF WBC: CPT | Performed by: STUDENT IN AN ORGANIZED HEALTH CARE EDUCATION/TRAINING PROGRAM

## 2022-12-10 PROCEDURE — 80053 COMPREHEN METABOLIC PANEL: CPT | Performed by: STUDENT IN AN ORGANIZED HEALTH CARE EDUCATION/TRAINING PROGRAM

## 2022-12-10 PROCEDURE — 82550 ASSAY OF CK (CPK): CPT | Performed by: STUDENT IN AN ORGANIZED HEALTH CARE EDUCATION/TRAINING PROGRAM

## 2022-12-10 PROCEDURE — 84484 ASSAY OF TROPONIN QUANT: CPT | Performed by: STUDENT IN AN ORGANIZED HEALTH CARE EDUCATION/TRAINING PROGRAM

## 2022-12-10 PROCEDURE — 36415 COLL VENOUS BLD VENIPUNCTURE: CPT

## 2022-12-10 RX ORDER — SODIUM CHLORIDE 0.9 % (FLUSH) 0.9 %
10 SYRINGE (ML) INJECTION AS NEEDED
Status: DISCONTINUED | OUTPATIENT
Start: 2022-12-10 | End: 2022-12-11 | Stop reason: HOSPADM

## 2022-12-10 RX ADMIN — IOPAMIDOL 125 ML: 755 INJECTION, SOLUTION INTRAVENOUS at 21:54

## 2022-12-11 VITALS
SYSTOLIC BLOOD PRESSURE: 142 MMHG | WEIGHT: 160 LBS | RESPIRATION RATE: 17 BRPM | HEIGHT: 69 IN | HEART RATE: 59 BPM | TEMPERATURE: 98.2 F | BODY MASS INDEX: 23.7 KG/M2 | DIASTOLIC BLOOD PRESSURE: 69 MMHG | OXYGEN SATURATION: 98 %

## 2022-12-11 NOTE — ED PROVIDER NOTES
Subjective   History of Present Illness  Patient is that he had an ablation done on Monday.  He states that he had some bleeding in his groin that needed sutures.  He states that he saw his cardiologist after this happened.  He states that he is still taking his Eliquis.  He states that he had an ultrasound done of his groin and right lower extremity yesterday but is not sure of the symptoms.  States that he has been feeling slightly short of breath and slightly dizzy and feels as if he has been a little bit more clumsy than normal.  Denies any headache or vision changes or any neck pain.  Denies any fevers or chills.  Denies any abdominal pain or dysuria or hematuria.  He states that the swelling in his groin has stayed the same he states that he has not had any hemoptysis or hematemesis.        Review of Systems   All other systems reviewed and are negative.      Past Medical History:   Diagnosis Date   • Acute retention of urine    • Atrial fibrillation (HCC)    • BPH (benign prostatic hyperplasia)    • Hyperthyroidism    • Microscopic hematuria    • Peyronie's disease        Allergies   Allergen Reactions   • Morphine Itching   • Morphine And Related    • Carvedilol Nausea And Vomiting       Past Surgical History:   Procedure Laterality Date   • CARDIAC ELECTROPHYSIOLOGY PROCEDURE N/A 12/5/2022    Procedure: EP/Ablation, atrial fibrillation;  Surgeon: Domi Krishnamurthy MD;  Location: [facility] CATH INVASIVE LOCATION;  Service: Cardiology;  Laterality: N/A;   • CARPAL TUNNEL RELEASE     • CLAVICLE SURGERY     • THYROIDECTOMY     • THYROIDECTOMY     • TOTAL HIP ARTHROPLASTY     • VASECTOMY         Family History   Problem Relation Age of Onset   • Heart disease Father    • Heart attack Father    • No Known Problems Mother    • Heart attack Brother    • Heart disease Brother    • Heart disease Paternal Grandfather    • Heart attack Paternal Grandfather        Social History     Socioeconomic History   • Marital status:     Tobacco Use   • Smoking status: Former   • Smokeless tobacco: Never   Vaping Use   • Vaping Use: Never used   Substance and Sexual Activity   • Alcohol use: Yes   • Drug use: Never   • Sexual activity: Defer           Objective   Physical Exam  Vitals and nursing note reviewed.   Constitutional:       General: He is not in acute distress.     Appearance: Normal appearance. He is not toxic-appearing or diaphoretic.   HENT:      Head: Normocephalic and atraumatic.      Nose: Nose normal.   Eyes:      General:         Right eye: No discharge.         Left eye: No discharge.      Extraocular Movements: Extraocular movements intact.      Conjunctiva/sclera: Conjunctivae normal.   Cardiovascular:      Rate and Rhythm: Normal rate.      Pulses: Normal pulses.   Pulmonary:      Effort: Pulmonary effort is normal. No respiratory distress.      Breath sounds: No rhonchi.   Abdominal:      General: Abdomen is flat.   Musculoskeletal:         General: Normal range of motion.      Cervical back: Normal range of motion.      Comments: Trace pitting edema bilaterally   Skin:     General: Skin is warm.      Capillary Refill: Capillary refill takes less than 2 seconds.      Findings: Bruising (to righ groin. No palpable bruit appreciated. 2+ pulses to distal lower extremities.,) present.   Neurological:      General: No focal deficit present.      Mental Status: He is alert and oriented to person, place, and time. Mental status is at baseline.   Psychiatric:         Mood and Affect: Mood normal.         Behavior: Behavior normal.         Thought Content: Thought content normal.         Judgment: Judgment normal.         Procedures           ED Course                                           MDM  Number of Diagnoses or Management Options  COVID-19  Elevated troponin  Diagnosis management comments: This is a 72yoM presenting with shortness of breath and dizziness.     The patient arrived hemodynamically stable and was  afebrile.     The patient was then placed on the monitor, IV access was established, and O2 was titrated to 95% or greater if needed by supplemental oxygen.  _____  A 12-lead EKG was ordered and interpreted:   Rate 67. Rhythm sinus. No signs of acute ischemia such as new ST elevation, new ST depression, or new T wave inversions. No signs of WPW, Pericarditis, LV aneurysm, Brugada, Complete Heart Block, or Atrial fibrillation.  _____  With the history provided, current physical exam, and results so far, I have low suspicion for pneumothorax as the patient is saturating well without any increased O2 requirement, is not tachypneic, and has no radiographic evidence of a pneumothorax.    With the history provided, current physical exam, and results so far, I have low suspicion for dissection there is no widened mediastinum, hypotension, pulse deficits, and no tearing type pain.    With the history provided, current physical exam, and results so far, I have low suspicion for pericarditis/myocarditis as there is no diffuse ST elevation or DE depression, no elevated troponin, the patient is afebrile, and has not had any recent viral illnesses.    With the history provided, current physical exam, and results so far, I have low suspicion for DVT causing a PE as low risk per WELLS criteria, there is no evidence of a DVT (i.e., no calf swelling, tenderness, palpable tortuous lower extremity vein, and there is a negative ramsey's sign bilaterally), or the d-dimer was negative using the age adjusted criteria.    With the history provided, current physical exam, and results so far, I have low suspicion for CHF exacerbation as there is no evidence of volume overload, no JVD, no rales on examination, and no recent orthopnea.    With the history provided, current physical exam, and results so far, I have low suspicion for pneumonia with no fevers, chills, cough, and no findings of obvious pneumonia on CXR or on physical exam.    With  the history provided, current physical exam, and results so far, I have low suspicion for large esophageal tears with no recent severe vomiting, no recent esophageal instrumentation, no crepitus on examination, and no obvious pneumomediastinum on CXR.    With the history provided, current physical exam, and results so far, I have low suspicion for a large GI bleed per patient's history that could contribute to the symptoms.    Patient has COVID-19.  Patient has been able to ambulate without any difficulty.  His troponin slightly elevated however he does not have any new EKG changes.  I offered him admission however he declined and would like to go home and follow-up with his primary care provider.  He is wife is also okay with this plan.  He was discharged in stable condition.      The patient was given a printed copy of discharge instructions.  _____      Signed:  Vincenzo Parson MD  Emergency Medicine Physician           Amount and/or Complexity of Data Reviewed  Clinical lab tests: reviewed  Tests in the radiology section of CPT®: reviewed        Final diagnoses:   COVID-19   Elevated troponin       ED Disposition  ED Disposition     ED Disposition   Discharge    Condition   Stable    Comment   --             Ingrid Vergara MD  [address] [facility] [address]  [phone]    Call in 1 day  As needed, If symptoms worsen         Medication List      No changes were made to your prescriptions during this visit.          Vincenzo Parson MD  12/13/22 [id number]

## 2022-12-11 NOTE — DISCHARGE INSTRUCTIONS
It was very nice to meet you, David. Thank you for allowing us to take care of you today at [facility].    Your evaluation today did not show any emergent findings or have any emergent indications for admission to the hospital.     Please understand that an ER evaluation is just the start of your evaluation. We will do what we can, but we are often unable to fully figure out what is causing your symptoms from one evaluation. Thus, our primary goal is to determine whether you need to be evaluated in the hospital or if it is safe for you to go home and see other doctors such as a primary care physician or a specialist on an outpatient basis.     Like we discussed, it is VERY IMPORTANT that you follow up with your primary care doctor (call them to set up an appointment) within the next few days or as soon as possible so that you can be re-evaluated for improvement in your symptoms or for any other questions.     A copy of your results should be included in your paperwork. If you were prescribed any medications, please take them as directed or call us back with any questions.    Please return to the emergency room within 12-48 hours if you experience fever, chills, chest pain or shortness of breath, pain with inspiration/expiration, pain that travels to your arms, neck or back, nausea, vomiting, severe headache, tearing pain in your chest, dizziness, feel as though you are about to pass out, have any worsening symptoms, or any other concerns.

## 2022-12-13 LAB
QT INTERVAL: 434 MS
QTC INTERVAL: 418 MS

## 2022-12-13 NOTE — TELEPHONE ENCOUNTER
Patient contacted and states the groin is not bigger in size and is not more painful. Patient advised to keep a watch on spot as it could take several weeks for the area to resolve. Patient verbalized understanding.

## 2022-12-20 ENCOUNTER — OFFICE VISIT (OUTPATIENT)
Dept: CARDIOLOGY | Facility: CLINIC | Age: 72
End: 2022-12-20

## 2022-12-20 VITALS
DIASTOLIC BLOOD PRESSURE: 70 MMHG | WEIGHT: 166 LBS | HEART RATE: 68 BPM | OXYGEN SATURATION: 98 % | SYSTOLIC BLOOD PRESSURE: 118 MMHG | HEIGHT: 69 IN | BODY MASS INDEX: 24.59 KG/M2

## 2022-12-20 DIAGNOSIS — I48.0 PAF (PAROXYSMAL ATRIAL FIBRILLATION): Primary | Chronic | ICD-10-CM

## 2022-12-20 DIAGNOSIS — S31.109D WOUND OF RIGHT GROIN, SUBSEQUENT ENCOUNTER: ICD-10-CM

## 2022-12-20 DIAGNOSIS — Z79.01 CURRENT USE OF LONG TERM ANTICOAGULATION: ICD-10-CM

## 2022-12-20 DIAGNOSIS — I10 PRIMARY HYPERTENSION: ICD-10-CM

## 2022-12-20 PROBLEM — S31.109A RIGHT GROIN WOUND: Status: ACTIVE | Noted: 2022-12-20

## 2022-12-20 PROCEDURE — 99214 OFFICE O/P EST MOD 30 MIN: CPT | Performed by: PHYSICIAN ASSISTANT

## 2022-12-20 PROCEDURE — 93000 ELECTROCARDIOGRAM COMPLETE: CPT | Performed by: PHYSICIAN ASSISTANT

## 2022-12-20 RX ORDER — LISINOPRIL 10 MG/1
1 TABLET ORAL DAILY
COMMUNITY
Start: 2022-08-25

## 2022-12-20 NOTE — PROGRESS NOTES
"[facility] -  CLINIC FOLLOW UP     Patient Care Team:  Ingrid Vergara MD as PCP - General (Family Medicine)  Ingrid Vergara MD as PCP - Family Medicine  Ingrid Vergara MD as Referring Physician (Family Medicine)  Jose Manuel Akhtar MD as Cardiologist (Cardiology)    Chief Complaint: follow up to groin hematoma    Subjective    EP Problems:  1.  Paroxysmal atrial fibrillation  -Previously on dronedarone, poorly tolerated  -PVI 12/5/22  2.  Paroxysmal SVT  3.  NSVT  4.  Frequent APCs  -Holter monitor with 5% burden     Cardiology Problems:  1.  Hypertension     Medical Problems:  1.  Hypothyroidism    HPI: Today I had the pleasure of seeing David Schulz in the cardiology clinic for follow up after having PVI December 5th. He had some minor bleeding post op in his groin access site and went home with a safe-stop device and returned the next day to clinic for removal. He had US the next day and this was negative for hematoma and pseudoaneurysm. Then he returned to the ER again for presyncope and SOB Dec 13th but was found to have COVID.     Since his procedure, he's been doing well from an electrical standpoint and denies any known recurrence of afib or palpitations. He does not have any concerns about his groin site, but can still feel some tenderness. Henies any trouble breathing or swallowing post op. Compliant with DOAC and PPI.     Objective     Visit Vitals  /70   Pulse 68   Ht 175.3 cm (69.02\")   Wt 75.3 kg (166 lb)   SpO2 98%   BMI 24.50 kg/m²           Vitals reviewed.   Constitutional:       Appearance: Healthy appearance. Not in distress.   Eyes:      Extraocular Movements: Extraocular movements intact.      Conjunctiva/sclera: Conjunctivae normal.      Pupils: Pupils are equal, round, and reactive to light.   HENT:      Head: Normocephalic and atraumatic.      Nose: Nose normal.    Mouth/Throat:      Lips: Pink.      Mouth: Mucous membranes are moist.      " Pharynx: Oropharynx is clear.   Neck:      Vascular: No carotid bruit or JVD. JVD normal.   Pulmonary:      Effort: Pulmonary effort is normal.      Breath sounds: Normal breath sounds.   Chest:      Chest wall: Not tender to palpatation.   Cardiovascular:      PMI at left midclavicular line. Normal rate. Regular rhythm. Normal S1. Normal S2.      Murmurs: There is no murmur.      No gallop. No rub.   Pulses:     Radial: 2+ bilaterally.     Posterior tibial: 2+ bilaterally.  Abdominal:      General: Bowel sounds are normal.      Palpations: Abdomen is soft.   Musculoskeletal: Normal range of motion.      Extremities: No clubbing present.     Cervical back: Normal range of motion. Skin:     General: Skin is warm and dry.   Neurological:      General: No focal deficit present.      Mental Status: Alert and oriented to person, place, and time.      Cranial Nerves: Cranial nerves are intact.   Psychiatric:         Attention and Perception: Attention normal.         Mood and Affect: Affect normal.         Speech: Speech normal.         Behavior: Behavior normal.         Cognition and Memory: Cognition normal.             The following portions of the patient's history were reviewed and updated as appropriate: allergies, current medications, past medical history, past social history, past and problem list.     Review of Systems   Constitutional: Positive for fatigue.        COVID positive Dec 13   HENT: Negative.    Eyes: Negative.    Respiratory: Negative.    Cardiovascular: Negative.    Gastrointestinal: Negative.    Endocrine: Negative.    Genitourinary: Negative.    Musculoskeletal: Negative.    Skin: Negative.    Allergic/Immunologic: Negative.    Neurological: Positive for light-headedness.   Hematological: Negative.    Psychiatric/Behavioral: Negative.           Echo EF Estimated  Lab Results   Component Value Date    ECHOEFEST 70 06/08/2020         ECG 12 Lead    Date/Time: 12/20/2022 1:22 PM  Performed by: Yinka  KASSY Cartagena  Authorized by: Angela Honeycutt PA   Comparison: compared with previous ECG   Rhythm: sinus rhythm  Rate: normal  QRS axis: normal                Medication Review: yes    Current Outpatient Medications:   •  Eliquis 5 MG tablet tablet, TAKE 1 TABLET BY MOUTH EVERY 12 HOURS, Disp: 60 tablet, Rfl: 11  •  levothyroxine (SYNTHROID, LEVOTHROID) 137 MCG tablet, Take 137 mcg by mouth Daily., Disp: , Rfl:   •  lisinopril (PRINIVIL,ZESTRIL) 10 MG tablet, Take 1 tablet by mouth Daily., Disp: 30 tablet, Rfl: 11  •  omeprazole (priLOSEC) 40 MG capsule, Take 1 capsule by mouth Daily., Disp: 30 capsule, Rfl: 0  •  QUEtiapine (SEROquel) 50 MG tablet, 1 TABLET(S) BY MOUTH NIGHTLY, Disp: , Rfl: 3  •  tamsulosin (FLOMAX) 0.4 MG capsule 24 hr capsule, Take 1 capsule by mouth 2 (Two) Times a Day., Disp: , Rfl:   •  lisinopril (PRINIVIL,ZESTRIL) 10 MG tablet, Take 1 tablet by mouth Daily., Disp: , Rfl:    Allergies   Allergen Reactions   • Morphine Itching   • Morphine And Related    • Carvedilol Nausea And Vomiting       I have reviewed       CBC:  Lab Results - Last 18 Months   Lab Units 12/10/22  1920   WBC 10*3/mm3 4.01   HEMOGLOBIN g/dL 13.4   HEMATOCRIT % 39.6   PLATELETS 10*3/mm3 246      BMP/CMP:  Lab Results - Last 18 Months   Lab Units 12/10/22  1920   SODIUM mmol/L 141   POTASSIUM mmol/L 4.1   CHLORIDE mmol/L 102   CO2 mmol/L 29.0   GLUCOSE mg/dL 110*   BUN mg/dL 10   CREATININE mg/dL 0.63*   CALCIUM mg/dL 8.5*     BNP:   Lab Results - Last 18 Months   Lab Units 12/10/22  1920   PROBNP pg/mL 156.2      THYROID:  Lab Results - Last 18 Months   Lab Units 11/08/22  1353   TSH uIU/mL 0.493       Results for orders placed during the hospital encounter of 01/10/22    Adult Transthoracic Echo Complete w/ Color, Spectral and Contrast if necessary per protocol    Interpretation Summary  · Left ventricular ejection fraction appears to be 56 - 60%. Left ventricular systolic function is normal.  · Left ventricular  diastolic function was normal.  · Abnormal global longitudinal LV strain (GLS) = -14.0%.  · Mild aortic valve regurgitation is present.  · Estimated right ventricular systolic pressure from tricuspid regurgitation is normal (<35 mmHg).     Assessment:   Diagnoses and all orders for this visit:    1. PAF (paroxysmal atrial fibrillation) (HCC) (Primary)    2. Current use of long term anticoagulation    3. Primary hypertension    4. Wound of right groin, subsequent encounter    Other orders  -     ECG 12 Lead    PAF s/p PVI Dec 5: Doing well without recurrence. Denies any trouble swallowing or chest pain or recurrent palpitations. Continue anticoagulation and follow up with Dr. Krishnamurthy in 3 months. Discussed that over the next 60-90 days, not uncommon to have early recurrence and if he does, he will advise us and we can see him for EKG and possible cardioversion.     Right groin access site: Resolving and no complaints today.       I spent 30 minutes caring for David on this date of service. This time includes time spent by me in the following activities:preparing for the visit, reviewing tests, obtaining and/or reviewing a separately obtained history, performing a medically appropriate examination and/or evaluation , counseling and educating the patient/family/caregiver, ordering medications, tests, or procedures, referring and communicating with other health care professionals  and documenting information in the medical record        Electronically signed by KASSY Cortez

## 2023-01-03 ENCOUNTER — OFFICE VISIT (OUTPATIENT)
Dept: UROLOGY | Age: 73
End: 2023-01-03
Payer: MEDICARE

## 2023-01-03 VITALS — TEMPERATURE: 98.1 F | WEIGHT: 170.4 LBS | BODY MASS INDEX: 25.24 KG/M2 | HEIGHT: 69 IN

## 2023-01-03 DIAGNOSIS — N40.1 BENIGN PROSTATIC HYPERPLASIA WITH WEAK URINARY STREAM: Primary | ICD-10-CM

## 2023-01-03 DIAGNOSIS — R39.12 BENIGN PROSTATIC HYPERPLASIA WITH WEAK URINARY STREAM: Primary | ICD-10-CM

## 2023-01-03 LAB
APPEARANCE FLUID: CLEAR
BILIRUBIN, POC: NORMAL
BLOOD URINE, POC: NORMAL
CLARITY, POC: CLEAR
COLOR, POC: YELLOW
GLUCOSE URINE, POC: NORMAL
KETONES, POC: NORMAL
LEUKOCYTE EST, POC: NORMAL
NITRITE, POC: NORMAL
PH, POC: 6
PROTEIN, POC: NORMAL
SPECIFIC GRAVITY, POC: 1.01
UROBILINOGEN, POC: 0.2

## 2023-01-03 PROCEDURE — G8484 FLU IMMUNIZE NO ADMIN: HCPCS | Performed by: NURSE PRACTITIONER

## 2023-01-03 PROCEDURE — 3017F COLORECTAL CA SCREEN DOC REV: CPT | Performed by: NURSE PRACTITIONER

## 2023-01-03 PROCEDURE — 99214 OFFICE O/P EST MOD 30 MIN: CPT | Performed by: NURSE PRACTITIONER

## 2023-01-03 PROCEDURE — G8427 DOCREV CUR MEDS BY ELIG CLIN: HCPCS | Performed by: NURSE PRACTITIONER

## 2023-01-03 PROCEDURE — 1036F TOBACCO NON-USER: CPT | Performed by: NURSE PRACTITIONER

## 2023-01-03 PROCEDURE — G8417 CALC BMI ABV UP PARAM F/U: HCPCS | Performed by: NURSE PRACTITIONER

## 2023-01-03 PROCEDURE — 1123F ACP DISCUSS/DSCN MKR DOCD: CPT | Performed by: NURSE PRACTITIONER

## 2023-01-03 PROCEDURE — 81002 URINALYSIS NONAUTO W/O SCOPE: CPT | Performed by: NURSE PRACTITIONER

## 2023-01-03 RX ORDER — OMEPRAZOLE 40 MG/1
CAPSULE, DELAYED RELEASE ORAL
Qty: 30 CAPSULE | Refills: 0 | OUTPATIENT
Start: 2023-01-03

## 2023-01-03 RX ORDER — OMEPRAZOLE 40 MG/1
CAPSULE, DELAYED RELEASE ORAL
COMMUNITY
Start: 2022-12-05

## 2023-01-03 NOTE — PROGRESS NOTES
Nitin Currie is a 67 y.o. male who presents today   Chief Complaint   Patient presents with    Follow-up     I am here today for my 30 day H&P       Patient is 50-year-old male who presents to clinic today to schedule surgery. He has failed medical therapy with no improvement despite maximum dose of tamsulosin. Previous cystoscopy on 10/17/2022 revealed mild to moderate anterior lateral lobe enlargement no median lobe. Patient decided on GLAP and surgery was scheduled for 11/9/2022. Unfortunately he contracted COVID and surgery was canceled. Patient has a history of A. fib. Cardiac clearance was obtained to hold Eliquis 2 days prior to intervention and resume thereafter. Since that time he had presented to the ED on 12/10/2022 with slightly elevated troponin and shortness of breath he was also COVID-positive at that time. EKG was negative. He has a history of atrial fibrillation and had an ablation on 12/5/2022. He continues on anticoagulation at this time but denies any further symptomatic episodes of palpitations, shortness of breath. He did have some complications with hematoma although has resolved at this time.     Past Medical History:   Diagnosis Date    Atrial fibrillation ([address]     Bipolar disorder ([address]     Colon polyps     Hypothyroidism     Insomnia        Past Surgical History:   Procedure Laterality Date    CARPAL TUNNEL RELEASE Right     approx 5 years ago    CLAVICLE SURGERY      x 2    COLONOSCOPY  ? 5-6 years ago        COLONOSCOPY N/A 12/19/2014    [facility]ica:  normal,   5y recall    COLONOSCOPY N/A 05/05/2021    Dr Jerica Wise, Benign Serrated Polyps (SSA -dysplasia, HP), Moderate diverticulosis, No clear-cut lesions to explain proctalgia/perianal itching, 1 year recall    COLONOSCOPY N/A 05/10/2022    Dr Jerica Wise, [facility] , Benign HP, Mod diverticulosis, 3 year colon recall    FOOT SURGERY      x 2    HERNIA REPAIR      JOINT REPLACEMENT Right     hip    THYROIDECTOMY VARICOSE VEIN SURGERY      VASECTOMY         Current Outpatient Medications   Medication Sig Dispense Refill    omeprazole (PRILOSEC) 40 MG delayed release capsule TAKE 1 CAPSULE BY MOUTH EVERY DAY      lisinopril (PRINIVIL;ZESTRIL) 10 MG tablet TAKE 1 TABLET BY MOUTH EVERY DAY      tamsulosin (FLOMAX) 0.4 MG capsule Take 2 capsules by mouth nightly 180 capsule 3    Vitamins/Minerals TABS Take 1 tablet by mouth daily      ELIQUIS 5 MG TABS tablet TAKE 1 TABLET BY MOUTH TWICE A DAY 60 tablet 5    QUEtiapine (SEROQUEL) 50 MG tablet Take 50 mg by mouth nightly. levothyroxine (SYNTHROID) 137 MCG tablet Take 137 mcg by mouth Daily        No current facility-administered medications for this visit. 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)    Here with his significant other    Previously  and     He has 1 son and 1 daughter    Education masters degree in fine arts    Former distance runner also bicyclist not so much these days    Smoked minimally 3 to 4 years total many years ago denies alcohol consumption or substance usage       Family History   Problem Relation Age of Onset    Dementia Mother     Obesity Mother     Stroke Father     Heart Attack Father     Other Sister     Other Brother         reheumatic fever- valve issue    Colon Cancer Neg Hx     Colon Polyps Neg Hx     Esophageal Cancer Neg Hx Liver Cancer Neg Hx     Rectal Cancer Neg Hx     Stomach Cancer Neg Hx        REVIEW OF SYSTEMS:  Review of Systems   Constitutional:  Negative for chills and fever. Gastrointestinal:  Negative for abdominal distention, abdominal pain, nausea and vomiting. Genitourinary:  Positive for frequency and urgency. Negative for difficulty urinating, dysuria, flank pain and hematuria. Musculoskeletal:  Negative for back pain and gait problem. Psychiatric/Behavioral:  Negative for agitation and confusion. PHYSICAL EXAM:  Temp 98.1 °F (36.7 °C) (Temporal)   Ht 5' 9\" (1.753 m)   Wt 170 lb 6.4 oz (77.3 kg)   BMI 25.16 kg/m²   Physical Exam  Vitals and nursing note reviewed. Constitutional:       General: He is not in acute distress. Appearance: Normal appearance. He is not ill-appearing. Pulmonary:      Effort: Pulmonary effort is normal. No respiratory distress. Abdominal:      General: There is no distension. Tenderness: There is no abdominal tenderness. There is no right CVA tenderness or left CVA tenderness. Neurological:      Mental Status: He is alert and oriented to person, place, and time. Mental status is at baseline. Psychiatric:         Mood and Affect: Mood normal.         Behavior: Behavior normal.     DATA:    Results for orders placed or performed in visit on 01/03/23   POCT Urinalysis no Micro   Result Value Ref Range    Color, UA YELLOW     Clarity, UA CLEAR     Glucose, UA POC NEG     Bilirubin, UA NEG     Ketones, UA NEG     Spec Grav, UA 1.010     Blood, UA POC TRACE     pH, UA 6     Protein, UA POC NEG     Urobilinogen, UA 0.2     Leukocytes, UA NEG     Nitrite, UA NEG     Appearance, Fluid Clear Clear, Slightly Cloudy     Lab Results   Component Value Date    PSA 0.26 02/17/2020       1. Benign prostatic hyperplasia with weak urinary stream  Patient would like to schedule surgery for GLAP.   He does understand this will be an outpatient procedure and the plan is to go home with a Cortes catheter. Discussed the risk for bleeding infection bladder neck contracture urethral stricture need for recurrent repeat procedure regrowth or residual adenoma. Failure to improve symptoms and need continued medical treatment. We also discussed the risk of incontinence, sexual dysfunction. He reports he is not sexually active. Due to recent events we will go ahead and send for cardiac clearance again. We will schedule him for GLAP in the next 2 to 3 weeks. Orders Placed This Encounter   Procedures    POCT Urinalysis no Micro        No follow-ups on file. All information inputted into the note by the MA to include chief complaint, past medical history, past surgical history, medications, allergies, social and family history and review of systems has been reviewed and updated as needed by me. EMR Dragon/transcription disclaimer: Much of this documentt is electronic  transcription/translation of spoken language to printed text. The  electronic translation of spoken language may be erroneous, or at times,  nonsensical words or phrases may be inadvertently transcribed.  Although I  have reviewed the document for such errors, some may still exist.

## 2023-01-04 ENCOUNTER — TELEPHONE (OUTPATIENT)
Dept: UROLOGY | Age: 73
End: 2023-01-04

## 2023-01-06 ASSESSMENT — ENCOUNTER SYMPTOMS
BACK PAIN: 0
VOMITING: 0
NAUSEA: 0
ABDOMINAL DISTENTION: 0
ABDOMINAL PAIN: 0

## 2023-01-10 ENCOUNTER — TELEPHONE (OUTPATIENT)
Dept: UROLOGY | Age: 73
End: 2023-01-10

## 2023-01-10 NOTE — TELEPHONE ENCOUNTER
I have called [facility] to see if patient can hold Eliquis 3 days prior to surgery on 1/17. I faxed this request but have not heard back about it. I have called and left a VM with Dr. Sisto Olszewski nurse asking for a call back.

## 2023-01-12 NOTE — TELEPHONE ENCOUNTER
Junette Cranker from Dr. Leopold Peper office called back so I was returning her phone call to see if I could discuss patient's clearance to hold Eliquis for his procedure on 1/17. I had to leave her another voicemail asking that she call us back since patient needs to stop Eliquis on 1/14.

## 2023-01-13 ENCOUNTER — TELEPHONE (OUTPATIENT)
Dept: CARDIOLOGY | Facility: CLINIC | Age: 73
End: 2023-01-13
Payer: MEDICARE

## 2023-01-13 ENCOUNTER — TELEPHONE (OUTPATIENT)
Dept: UROLOGY | Age: 73
End: 2023-01-13

## 2023-01-13 NOTE — TELEPHONE ENCOUNTER
Reid called to cancel a  his procedure on 1/17. Pt. Had heart ablation in December and heart  Told pt. He needed to cancel this procedure and wait for 2 or 3 months before he has done .     Please be advised that the best time to call him to accommodate their needs is Anytime.     Thank you.

## 2023-01-13 NOTE — TELEPHONE ENCOUNTER
[facility] contacted me regarding surgery clearance letter that was faxed over earlier this week. Trina left multiple message regarding this issue.     Advised I did not receive letter. Two messages were sent to VALERY Broussard to send to Angela d/t her seeing patient 12/20/22. I did not receive an answer back so I spoke with Dr. Krishnamurthy.     Dr. Krishnamurthy advised me patient was not able to stop Eliquis until 2-3 months post ablation. Dr. Krishnamurthy called patient himself and advised patient he would like him to wait to have this procedure if he is able, patient was agreeable to this.     I called [facility] they advised me Angela has given the ok to stop Eliquis for procedure. I advised I do not see a note in the chart where Angela ok 'd the interruption. But did tell the office Dr. Krishnamurthy did not want patient stopping Eliquis until 2-3 months post ablation.    Trina confirmed with surgeon and all of medical staff of Dr. Krishnamurthy's decision and surgery has been postponed until after March 2023.

## 2023-01-13 NOTE — TELEPHONE ENCOUNTER
[facility] and she said he could not hold his Eliquis for another few months. I have called the patient with this information. He has an appointment with them in March and I told him if Dr. Taryn Garcia says he can hold his Eliquis at that appointment to give us a call and we would be happy to get him rescheduled for GLAP procedure. We will follow up with him in 3 months as well.

## 2023-03-10 RX ORDER — APIXABAN 5 MG/1
TABLET, FILM COATED ORAL
Qty: 60 TABLET | Refills: 11 | Status: SHIPPED | OUTPATIENT
Start: 2023-03-10

## 2023-03-22 NOTE — PROGRESS NOTES
"[facility] -  CLINIC FOLLOW UP     Patient Care Team:  Ingrid Vergara MD as PCP - General (Family Medicine)  Ingrid Vergara MD as PCP - Family Medicine  Ingrid Vergara MD as Referring Physician (Family Medicine)  Jose Manuel Akhtar MD as Cardiologist (Cardiology)    Chief Complaint: DOAC recs     Subjective    EP Problems:  1.  Paroxysmal atrial fibrillation  -Previously on dronedarone, poorly tolerated  -PVI 12/5/22  2.  Paroxysmal SVT  3.  NSVT  4.  Frequent APCs  -Holter monitor with 5% burden     Cardiology Problems:  1.  Hypertension     Medical Problems:  1.  Hypothyroidism    HPI: Today I had the pleasure of seeing David Schulz in the cardiology clinic for follow up. He underwent PVI December 5th and has maintained sinus rhythm overall and done well. He was dur to have prostate surgery in December, but contracted COVID and then couldn't hold his anticoagulant due to PVI. He is hopeful to undergo the GLAP procedure with Dr. Tolliver now. He is also due to have cataract surgery by Dr. Tejeda at [facility].     He follows up today to discuss recommendations about his anticoagulation now that he is over 3 months out from his ablation. He denies any recurrence of afib to his knowledge and EKG today shows sinus rhythm. He denies any ER visits or hospitalizations. Blood pressure has been stable.     Review of his records show a previous Coronary CTA from 2020 with a calcium score of 34. Previous stress testing was negative for ischemia in March 2022. His last echo demonstrated normal LV function in Feb 2022 as well.     Objective     Visit Vitals  /72   Pulse 64   Ht 175.3 cm (69.02\")   Wt 77.1 kg (170 lb)   SpO2 99%   BMI 25.09 kg/m²           Vitals reviewed.   Constitutional:       Appearance: Healthy appearance. Not in distress.   Eyes:      Extraocular Movements: Extraocular movements intact.      Conjunctiva/sclera: Conjunctivae normal.      Pupils: Pupils " are equal, round, and reactive to light.   HENT:      Head: Normocephalic and atraumatic.      Nose: Nose normal.    Mouth/Throat:      Lips: Pink.      Mouth: Mucous membranes are moist.      Pharynx: Oropharynx is clear.   Neck:      Vascular: No carotid bruit or JVD. JVD normal.   Pulmonary:      Effort: Pulmonary effort is normal.      Breath sounds: Normal breath sounds.   Chest:      Chest wall: Not tender to palpatation.   Cardiovascular:      PMI at left midclavicular line. Normal rate. Regular rhythm. Normal S1. Normal S2.      Murmurs: There is no murmur.      No gallop. No rub.   Pulses:     Radial: 2+ bilaterally.     Posterior tibial: 2+ bilaterally.  Abdominal:      General: Bowel sounds are normal.      Palpations: Abdomen is soft.   Musculoskeletal: Normal range of motion.      Extremities: No clubbing present.     Cervical back: Normal range of motion. Skin:     General: Skin is warm and dry.   Neurological:      General: No focal deficit present.      Mental Status: Alert and oriented to person, place, and time.      Cranial Nerves: Cranial nerves are intact.   Psychiatric:         Attention and Perception: Attention normal.         Mood and Affect: Affect normal.         Speech: Speech normal.         Behavior: Behavior normal.         Cognition and Memory: Cognition normal.             The following portions of the patient's history were reviewed and updated as appropriate: allergies, current medications, past medical history, past social history, past and problem list.     Review of Systems   Constitutional: Negative.    HENT: Negative.    Eyes: Negative.    Respiratory: Negative.    Cardiovascular: Negative.    Gastrointestinal: Negative.    Endocrine: Negative.    Genitourinary: Positive for difficulty urinating.   Musculoskeletal: Negative.    Skin: Negative.    Allergic/Immunologic: Negative.    Neurological: Negative.    Hematological: Negative.    Psychiatric/Behavioral: Negative.            Echo EF Estimated  Lab Results   Component Value Date    ECHOEFEST 70 06/08/2020         ECG 12 Lead    Date/Time: 3/23/2023 10:50 AM  Performed by: Angela Honeycutt PA  Authorized by: Angela Honeycutt PA   Comparison: compared with previous ECG   Rhythm: sinus rhythm  Rate: normal  QRS axis: normal    Clinical impression: normal ECG             Cardiac CT angiography 7/15/2020     Impression:      1. Total calcium score : 34.6  2. Mild focal calcification of the left anterior descending coronary artery without any high-grade stenotic lesions noted in any of the epicardial coronary arteries.  3.  Slight myocardial bridging noted of the mid left anterior descending coronary artery      Medication Review: yes    Current Outpatient Medications:   •  CALCIUM-MAGNESIUM-ZINC PO, Take  by mouth., Disp: , Rfl:   •  Eliquis 5 MG tablet tablet, TAKE 1 TABLET BY MOUTH EVERY 12 HOURS, Disp: 60 tablet, Rfl: 11  •  levothyroxine (SYNTHROID, LEVOTHROID) 137 MCG tablet, Take 1 tablet by mouth Daily., Disp: , Rfl:   •  lisinopril (PRINIVIL,ZESTRIL) 10 MG tablet, Take 1 tablet by mouth Daily., Disp: , Rfl:   •  Lysine 500 MG capsule, Take 1 capsule by mouth Daily., Disp: , Rfl:   •  QUEtiapine (SEROquel) 50 MG tablet, 25 mg., Disp: , Rfl: 3  •  tamsulosin (FLOMAX) 0.4 MG capsule 24 hr capsule, Take 1 capsule by mouth 2 (Two) Times a Day., Disp: , Rfl:    Allergies   Allergen Reactions   • Morphine Itching   • Morphine And Related    • Carvedilol Nausea And Vomiting       I have reviewed       CBC:  Lab Results - Last 18 Months   Lab Units 01/10/23  0926   WBC K/uL 3.8*   HEMOGLOBIN g/dL 14.0   HEMATOCRIT % 41.0*   PLATELETS K/uL 210      BMP/CMP:  Lab Results - Last 18 Months   Lab Units 12/10/22  1920   SODIUM mmol/L 141   POTASSIUM mmol/L 4.1   CHLORIDE mmol/L 102   CO2 mmol/L 29.0   GLUCOSE mg/dL 110*   BUN mg/dL 10   CREATININE mg/dL 0.63*   CALCIUM mg/dL 8.5*     BNP:   Lab Results - Last 18 Months   Lab Units  12/10/22  1920   PROBNP pg/mL 156.2      THYROID:  Lab Results - Last 18 Months   Lab Units 11/08/22  1353   TSH uIU/mL 0.493       Results for orders placed during the hospital encounter of 01/10/22    Adult Transthoracic Echo Complete w/ Color, Spectral and Contrast if necessary per protocol    Interpretation Summary  · Left ventricular ejection fraction appears to be 56 - 60%. Left ventricular systolic function is normal.  · Left ventricular diastolic function was normal.  · Abnormal global longitudinal LV strain (GLS) = -14.0%.  · Mild aortic valve regurgitation is present.  · Estimated right ventricular systolic pressure from tricuspid regurgitation is normal (<35 mmHg).     Assessment:   Diagnoses and all orders for this visit:    1. PAF (paroxysmal atrial fibrillation) (HCC) (Primary)  -     ECG 12 Lead    2. Non-ischemic cardiomyopathy (HCC)    3. BPH with urinary obstruction    PAF s/p PVI December 2022: CHADsVASC 2. He denies any recurrence of his arrhythmia and has done well, compliant with his DOAC. He may hold his anticoagulation 2-3 days prior to his cataract surgery and restart as soon as possible post op.     BPH: Planning to have GLAP procedure with Dr. Tolliver. He is acceptable cardiac risk without any modifiable risk factors. Hold anticoagulation 2-3 days prior and restart when safe to do so by urology.     NICM: Echo 2/2022 showed normalization of EF, which had been reduced prior to 2020. Has been maintained on GDMT since.     Follow up 6 months with Dr. Krishnamurthy.     I spent 30 minutes caring for David on this date of service. This time includes time spent by me in the following activities:preparing for the visit, reviewing tests, obtaining and/or reviewing a separately obtained history, performing a medically appropriate examination and/or evaluation , counseling and educating the patient/family/caregiver, ordering medications, tests, or procedures, referring and communicating with other health  care professionals  and documenting information in the medical record        Electronically signed by KASSY Cortez

## 2023-03-23 ENCOUNTER — OFFICE VISIT (OUTPATIENT)
Dept: CARDIOLOGY | Facility: CLINIC | Age: 73
End: 2023-03-23
Payer: MEDICARE

## 2023-03-23 VITALS
WEIGHT: 170 LBS | HEART RATE: 64 BPM | DIASTOLIC BLOOD PRESSURE: 72 MMHG | SYSTOLIC BLOOD PRESSURE: 126 MMHG | BODY MASS INDEX: 25.18 KG/M2 | OXYGEN SATURATION: 99 % | HEIGHT: 69 IN

## 2023-03-23 DIAGNOSIS — N40.1 BPH WITH URINARY OBSTRUCTION: ICD-10-CM

## 2023-03-23 DIAGNOSIS — I48.0 PAF (PAROXYSMAL ATRIAL FIBRILLATION): Primary | ICD-10-CM

## 2023-03-23 DIAGNOSIS — I42.8 NON-ISCHEMIC CARDIOMYOPATHY: Chronic | ICD-10-CM

## 2023-03-23 DIAGNOSIS — N13.8 BPH WITH URINARY OBSTRUCTION: ICD-10-CM

## 2023-03-23 PROCEDURE — 93000 ELECTROCARDIOGRAM COMPLETE: CPT | Performed by: PHYSICIAN ASSISTANT

## 2023-03-23 PROCEDURE — 3078F DIAST BP <80 MM HG: CPT | Performed by: PHYSICIAN ASSISTANT

## 2023-03-23 PROCEDURE — 3074F SYST BP LT 130 MM HG: CPT | Performed by: PHYSICIAN ASSISTANT

## 2023-03-23 PROCEDURE — 99214 OFFICE O/P EST MOD 30 MIN: CPT | Performed by: PHYSICIAN ASSISTANT

## 2023-07-31 ENCOUNTER — TELEPHONE (OUTPATIENT)
Dept: CARDIOLOGY | Facility: CLINIC | Age: 73
End: 2023-07-31
Payer: MEDICARE

## 2023-07-31 NOTE — TELEPHONE ENCOUNTER
Patient called in stating that since last week he has had some episodes of being in and out of rhythm. He states he is not feeling great today, but thinks he is back in NSR. Patient has an upcoming montserrat on 9/2023. Ablation was done 12/2022

## 2023-08-02 RX ORDER — METOPROLOL SUCCINATE 25 MG/1
25 TABLET, EXTENDED RELEASE ORAL DAILY
Qty: 30 TABLET | Refills: 11 | Status: SHIPPED | OUTPATIENT
Start: 2023-08-02

## 2023-08-11 RX ORDER — LISINOPRIL 10 MG/1
TABLET ORAL
Qty: 90 TABLET | Refills: 3 | Status: SHIPPED | OUTPATIENT
Start: 2023-08-11

## 2023-09-26 ENCOUNTER — OFFICE VISIT (OUTPATIENT)
Dept: CARDIOLOGY | Facility: CLINIC | Age: 73
End: 2023-09-26
Payer: MEDICARE

## 2023-09-26 VITALS
BODY MASS INDEX: 25.31 KG/M2 | SYSTOLIC BLOOD PRESSURE: 134 MMHG | DIASTOLIC BLOOD PRESSURE: 72 MMHG | RESPIRATION RATE: 18 BRPM | HEIGHT: 68 IN | OXYGEN SATURATION: 98 % | HEART RATE: 60 BPM | WEIGHT: 167 LBS

## 2023-09-26 DIAGNOSIS — I48.0 PAF (PAROXYSMAL ATRIAL FIBRILLATION): Primary | Chronic | ICD-10-CM

## 2023-09-26 DIAGNOSIS — I47.10 PAROXYSMAL SVT (SUPRAVENTRICULAR TACHYCARDIA): ICD-10-CM

## 2023-09-26 PROCEDURE — 99214 OFFICE O/P EST MOD 30 MIN: CPT | Performed by: STUDENT IN AN ORGANIZED HEALTH CARE EDUCATION/TRAINING PROGRAM

## 2023-09-26 PROCEDURE — 3078F DIAST BP <80 MM HG: CPT | Performed by: STUDENT IN AN ORGANIZED HEALTH CARE EDUCATION/TRAINING PROGRAM

## 2023-09-26 PROCEDURE — 93000 ELECTROCARDIOGRAM COMPLETE: CPT | Performed by: STUDENT IN AN ORGANIZED HEALTH CARE EDUCATION/TRAINING PROGRAM

## 2023-09-26 PROCEDURE — 3075F SYST BP GE 130 - 139MM HG: CPT | Performed by: STUDENT IN AN ORGANIZED HEALTH CARE EDUCATION/TRAINING PROGRAM

## 2023-09-26 PROCEDURE — 1160F RVW MEDS BY RX/DR IN RCRD: CPT | Performed by: STUDENT IN AN ORGANIZED HEALTH CARE EDUCATION/TRAINING PROGRAM

## 2023-09-26 PROCEDURE — 1159F MED LIST DOCD IN RCRD: CPT | Performed by: STUDENT IN AN ORGANIZED HEALTH CARE EDUCATION/TRAINING PROGRAM

## 2023-09-26 NOTE — PATIENT INSTRUCTIONS
3 month follow up  No medication changes for now  Holter monitor for 2 weeks\  Consider a Kardia device

## 2023-09-26 NOTE — PROGRESS NOTES
"Chief Complaint  Atrial Fibrillation (6 mo fu )    Subjective        History of Present Illness    EP Problems:  1.  Paroxysmal atrial fibrillation  -Previously on dronedarone, poorly tolerated  2.  Paroxysmal SVT  3.  NSVT  4.  Frequent APCs  -Holter monitor with 5% burden     Cardiology Problems:  1.  Hypertension     Medical Problems:  1.  Hypothyroidism         David Schulz is a 73 y.o. male with problem list as above who presents to the clinic for follow up of paroxysmal atrial fibrillation, PACs, paroxysmal SVT.  He has not had any known episodes of atrial fibrillation since the time of his ablation.  He does have intermittent spells which can last for several hours in duration where he feels his heart skipping, has shortness of breath.  He notices that his heart rate is usually in the 50s when these episodes occur and his blood pressure is normal.  They are fairly infrequent in nature occurring at less than 1 time per month.  Aside from this, he generally feels well.    Objective   Vital Signs:  /72 (BP Location: Right arm, Patient Position: Sitting)   Pulse 60   Resp 18   Ht 172.7 cm (68\")   Wt 75.8 kg (167 lb)   SpO2 98%   BMI 25.39 kg/m²   Estimated body mass index is 25.39 kg/m² as calculated from the following:    Height as of this encounter: 172.7 cm (68\").    Weight as of this encounter: 75.8 kg (167 lb).      Physical Exam  Vitals reviewed.   Constitutional:       Appearance: Normal appearance.   HENT:      Head: Normocephalic and atraumatic.   Eyes:      Extraocular Movements: Extraocular movements intact.      Conjunctiva/sclera: Conjunctivae normal.   Cardiovascular:      Rate and Rhythm: Normal rate and regular rhythm.      Pulses: Normal pulses.      Heart sounds: Normal heart sounds.   Pulmonary:      Effort: Pulmonary effort is normal.      Breath sounds: Normal breath sounds.   Musculoskeletal:         General: No swelling.   Neurological:      General: No focal deficit " present.      Mental Status: He is alert and oriented to person, place, and time.   Psychiatric:         Mood and Affect: Mood normal.         Judgment: Judgment normal.      Result Review :  The following data was reviewed by: Domi Krishnamurthy MD on 09/26/2023:    [id number] SCORE   [id number] Score: 2 (9/26/2023 11:21 AM)          ECG 12 Lead    Date/Time: 9/26/2023 11:21 AM  Performed by: Domi Krishnamurthy MD  Authorized by: Domi Krishnamurthy MD   Comparison: compared with previous ECG from 3/23/2023  Similar to previous ECG  Rhythm: sinus rhythm  Conduction: conduction normal  QRS axis: normal  Other: no other findings    Clinical impression: normal ECG            Assessment and Plan   Diagnoses and all orders for this visit:    1. PAF (paroxysmal atrial fibrillation) (Primary)  -     Holter Monitor - 72 Hour Up To 15 Days; Future    2. Paroxysmal SVT (supraventricular tachycardia)    Other orders  -     ECG 12 Lead        David Schulz is a 73 y.o. male with problem list as above who presents to the clinic for follow up of paroxysmal atrial fibrillation, paroxysmal SVT, palpitations.  He is overall doing well with no known recurrence of atrial fibrillation, however he does continue to have these sporadic palpitations associated with fatigue and shortness of breath.  It is unclear exactly what this is at this time, however given his heart rates in the 50s, atrial fibrillation seems less likely.  It may be that he is having bouts of PACs or PVCs causing his symptoms.  We will plan to repeat another Holter monitor at this time to reassess his findings.  Should this not be effective, he may need to consider a [employer] device for home monitoring.    Plan:  -2-week Holter monitor  -Continue apixaban at current dose given elevated [id number]  -Risk-benefit at this time favors no empiric antiarrhythmics at this time until the results of the Holter monitor return particularly in light of his underlying sinus bradycardia and  bradycardia with these events         Follow Up   Return in about 3 months (around 12/26/2023).  Patient was given instructions and counseling regarding his condition or for health maintenance advice. Please see specific information pulled into the AVS if appropriate.     Part of this note may be an electronic transcription/translation of spoken language to printed text using the Dragon Dictation System.

## 2023-11-30 ENCOUNTER — TRANSCRIBE ORDERS (OUTPATIENT)
Dept: ADMINISTRATIVE | Facility: HOSPITAL | Age: 73
End: 2023-11-30
Payer: MEDICARE

## 2023-11-30 DIAGNOSIS — R51.9 HEADACHE, UNSPECIFIED HEADACHE TYPE: Primary | ICD-10-CM

## 2023-12-21 ENCOUNTER — HOSPITAL ENCOUNTER (OUTPATIENT)
Dept: CT IMAGING | Facility: HOSPITAL | Age: 73
Discharge: HOME | End: 2023-12-21
Admitting: FAMILY MEDICINE
Payer: MEDICARE

## 2023-12-21 DIAGNOSIS — R51.9 HEADACHE, UNSPECIFIED HEADACHE TYPE: ICD-10-CM

## 2023-12-21 LAB — CREAT BLDA-MCNC: 0.7 MG/DL (ref 0.6–1.3)

## 2023-12-21 PROCEDURE — 82565 ASSAY OF CREATININE: CPT

## 2023-12-21 PROCEDURE — 25510000001 IOPAMIDOL 61 % SOLUTION: Performed by: FAMILY MEDICINE

## 2023-12-21 PROCEDURE — 70470 CT HEAD/BRAIN W/O & W/DYE: CPT

## 2023-12-21 RX ADMIN — IOPAMIDOL 100 ML: 612 INJECTION, SOLUTION INTRAVENOUS at 12:53

## 2023-12-28 ENCOUNTER — OFFICE VISIT (OUTPATIENT)
Dept: CARDIOLOGY | Facility: CLINIC | Age: 73
End: 2023-12-28
Payer: MEDICARE

## 2023-12-28 VITALS
SYSTOLIC BLOOD PRESSURE: 110 MMHG | HEIGHT: 68 IN | HEART RATE: 59 BPM | DIASTOLIC BLOOD PRESSURE: 70 MMHG | WEIGHT: 169 LBS | BODY MASS INDEX: 25.61 KG/M2

## 2023-12-28 DIAGNOSIS — I48.0 PAF (PAROXYSMAL ATRIAL FIBRILLATION): Primary | ICD-10-CM

## 2023-12-28 PROCEDURE — 1159F MED LIST DOCD IN RCRD: CPT | Performed by: STUDENT IN AN ORGANIZED HEALTH CARE EDUCATION/TRAINING PROGRAM

## 2023-12-28 PROCEDURE — 3078F DIAST BP <80 MM HG: CPT | Performed by: STUDENT IN AN ORGANIZED HEALTH CARE EDUCATION/TRAINING PROGRAM

## 2023-12-28 PROCEDURE — 99213 OFFICE O/P EST LOW 20 MIN: CPT | Performed by: STUDENT IN AN ORGANIZED HEALTH CARE EDUCATION/TRAINING PROGRAM

## 2023-12-28 PROCEDURE — 93000 ELECTROCARDIOGRAM COMPLETE: CPT | Performed by: STUDENT IN AN ORGANIZED HEALTH CARE EDUCATION/TRAINING PROGRAM

## 2023-12-28 PROCEDURE — 1160F RVW MEDS BY RX/DR IN RCRD: CPT | Performed by: STUDENT IN AN ORGANIZED HEALTH CARE EDUCATION/TRAINING PROGRAM

## 2023-12-28 PROCEDURE — 3074F SYST BP LT 130 MM HG: CPT | Performed by: STUDENT IN AN ORGANIZED HEALTH CARE EDUCATION/TRAINING PROGRAM

## 2023-12-28 NOTE — PROGRESS NOTES
"Chief Complaint  Atrial Fibrillation    Subjective        History of Present Illness    EP Problems:  1.  Paroxysmal atrial fibrillation  -Previously on dronedarone, poorly tolerated  2.  Paroxysmal SVT  3.  NSVT  4.  Frequent APCs  -Holter monitor with 5% burden     Cardiology Problems:  1.  Hypertension     Medical Problems:  1.  Hypothyroidism      David Schulz is a 73 y.o. male with problem list as above who presents to the clinic for follow up of paroxysmal atrial fibrillation, paroxysmal SVT.  He is doing well without known recurrences.  In October he had some brief episodes prompting a Holter monitor which revealed only paroxysmal SVT.    Objective   Vital Signs:  /70   Pulse 59   Ht 172.7 cm (68\")   Wt 76.7 kg (169 lb)   BMI 25.70 kg/m²   Estimated body mass index is 25.7 kg/m² as calculated from the following:    Height as of this encounter: 172.7 cm (68\").    Weight as of this encounter: 76.7 kg (169 lb).      Physical Exam  Vitals reviewed.   Constitutional:       Appearance: Normal appearance.   Cardiovascular:      Rate and Rhythm: Normal rate and regular rhythm.      Pulses: Normal pulses.      Heart sounds: Normal heart sounds.   Pulmonary:      Effort: Pulmonary effort is normal.      Breath sounds: Normal breath sounds.   Musculoskeletal:         General: No swelling.   Neurological:      Mental Status: He is alert and oriented to person, place, and time.   Psychiatric:         Mood and Affect: Mood normal.         Judgment: Judgment normal.        Result Review :  The following data was reviewed by: Domi Krishnamurthy MD on 12/28/2023:    [id number] SCORE   [id number] Score: 2 (12/28/2023  8:37 AM)          ECG 12 Lead    Date/Time: 12/28/2023 8:37 AM  Performed by: Domi Krishnamurthy MD    Authorized by: Domi Krishnamurthy MD  Comparison: compared with previous ECG from 9/26/2023  Rhythm: sinus rhythm  Rate: normal  Conduction: conduction normal  QRS axis: normal  Other: no other " findings    Clinical impression: normal ECG              Assessment and Plan   Diagnoses and all orders for this visit:    1. PAF (paroxysmal atrial fibrillation) (Primary)  -     ECG 12 Lead    Other orders  -     apixaban (Eliquis) 5 MG tablet tablet; Take 1 tablet by mouth Every 12 (Twelve) Hours.  Dispense: 180 tablet; Refill: 3        David Schulz is a 73 y.o. male with problem list as above who presents to the clinic for follow up of paroxysmal SVT and paroxysmal atrial fibrillation.  Doing well without known recurrences.  Continue apixaban for anticoagulation indefinitely given elevated [id number].  No additional medication changes at this time.    Plan:  -No additional medication changes  -6-month follow-up for symptom monitoring         Follow Up   Return in about 6 months (around 6/28/2024).  Patient was given instructions and counseling regarding his condition or for health maintenance advice. Please see specific information pulled into the AVS if appropriate.     Part of this note may be an electronic transcription/translation of spoken language to printed text using the Dragon Dictation System.

## 2024-01-19 ENCOUNTER — LAB (OUTPATIENT)
Dept: LAB | Facility: HOSPITAL | Age: 74
End: 2024-01-19
Payer: MEDICARE

## 2024-01-19 ENCOUNTER — TRANSCRIBE ORDERS (OUTPATIENT)
Dept: ADMINISTRATIVE | Facility: HOSPITAL | Age: 74
End: 2024-01-19
Payer: MEDICARE

## 2024-01-19 DIAGNOSIS — R05.1 ACUTE COUGH: ICD-10-CM

## 2024-01-19 DIAGNOSIS — R05.1 ACUTE COUGH: Primary | ICD-10-CM

## 2024-01-19 LAB
B PARAPERT DNA SPEC QL NAA+PROBE: NOT DETECTED
B PERT DNA SPEC QL NAA+PROBE: NOT DETECTED
C PNEUM DNA NPH QL NAA+NON-PROBE: NOT DETECTED
FLUAV H1 2009 PAND RNA NPH QL NAA+PROBE: DETECTED
FLUBV RNA ISLT QL NAA+PROBE: NOT DETECTED
HADV DNA SPEC NAA+PROBE: NOT DETECTED
HCOV 229E RNA SPEC QL NAA+PROBE: NOT DETECTED
HCOV HKU1 RNA SPEC QL NAA+PROBE: NOT DETECTED
HCOV NL63 RNA SPEC QL NAA+PROBE: NOT DETECTED
HCOV OC43 RNA SPEC QL NAA+PROBE: NOT DETECTED
HMPV RNA NPH QL NAA+NON-PROBE: NOT DETECTED
HPIV1 RNA ISLT QL NAA+PROBE: NOT DETECTED
HPIV2 RNA SPEC QL NAA+PROBE: NOT DETECTED
HPIV3 RNA NPH QL NAA+PROBE: NOT DETECTED
HPIV4 P GENE NPH QL NAA+PROBE: NOT DETECTED
M PNEUMO IGG SER IA-ACNC: NOT DETECTED
RHINOVIRUS RNA SPEC NAA+PROBE: NOT DETECTED
RSV RNA NPH QL NAA+NON-PROBE: NOT DETECTED
SARS-COV-2 RNA NPH QL NAA+NON-PROBE: NOT DETECTED

## 2024-01-19 PROCEDURE — 0202U NFCT DS 22 TRGT SARS-COV-2: CPT

## 2024-05-01 ENCOUNTER — TRANSCRIBE ORDERS (OUTPATIENT)
Dept: ADMINISTRATIVE | Age: 74
End: 2024-05-01

## 2024-05-01 DIAGNOSIS — M19.031 ARTHRITIS OF SCAPHOID-TRAPEZIUM-TRAPEZOID JOINT OF RIGHT HAND: Primary | ICD-10-CM

## 2024-05-01 DIAGNOSIS — M19.032 ARTHRITIS OF SCAPHOID-TRAPEZIUM-TRAPEZOID JOINT OF LEFT HAND: ICD-10-CM

## 2024-05-10 ENCOUNTER — HOSPITAL ENCOUNTER (OUTPATIENT)
Dept: GENERAL RADIOLOGY | Age: 74
Discharge: HOME | End: 2024-05-10
Attending: ORTHOPAEDIC SURGERY
Payer: MEDICARE

## 2024-05-10 DIAGNOSIS — M19.031 ARTHRITIS OF SCAPHOID-TRAPEZIUM-TRAPEZOID JOINT OF RIGHT HAND: ICD-10-CM

## 2024-05-10 DIAGNOSIS — M19.032 ARTHRITIS OF SCAPHOID-TRAPEZIUM-TRAPEZOID JOINT OF LEFT HAND: ICD-10-CM

## 2024-05-10 PROCEDURE — 6360000002 HC RX W HCPCS: Performed by: ORTHOPAEDIC SURGERY

## 2024-05-10 PROCEDURE — 25246 INJECTION FOR WRIST X-RAY: CPT

## 2024-05-10 PROCEDURE — 2500000003 HC RX 250 WO HCPCS: Performed by: ORTHOPAEDIC SURGERY

## 2024-05-10 PROCEDURE — 77002 NEEDLE LOCALIZATION BY XRAY: CPT

## 2024-05-10 PROCEDURE — 6360000004 HC RX CONTRAST MEDICATION: Performed by: ORTHOPAEDIC SURGERY

## 2024-05-10 RX ORDER — IOPAMIDOL 408 MG/ML
2 INJECTION, SOLUTION INTRATHECAL
Status: COMPLETED | OUTPATIENT
Start: 2024-05-10 | End: 2024-05-10

## 2024-05-10 RX ORDER — LIDOCAINE HYDROCHLORIDE 10 MG/ML
0.5 INJECTION, SOLUTION EPIDURAL; INFILTRATION; INTRACAUDAL; PERINEURAL ONCE
Status: COMPLETED | OUTPATIENT
Start: 2024-05-10 | End: 2024-05-10

## 2024-05-10 RX ORDER — DEXAMETHASONE SODIUM PHOSPHATE 10 MG/ML
10 INJECTION INTRAMUSCULAR; INTRAVENOUS EVERY 6 HOURS
Status: DISCONTINUED | OUTPATIENT
Start: 2024-05-10 | End: 2024-05-12 | Stop reason: HOSPADM

## 2024-05-10 RX ORDER — DEXAMETHASONE SODIUM PHOSPHATE 10 MG/ML
6 INJECTION, SOLUTION INTRAMUSCULAR; INTRAVENOUS ONCE
Status: DISCONTINUED | OUTPATIENT
Start: 2024-05-10 | End: 2024-05-12 | Stop reason: HOSPADM

## 2024-05-10 RX ADMIN — LIDOCAINE HYDROCHLORIDE 0.5 ML: 10 INJECTION, SOLUTION EPIDURAL; INFILTRATION; INTRACAUDAL; PERINEURAL at 11:59

## 2024-05-10 RX ADMIN — IOPAMIDOL 2 ML: 408 INJECTION, SOLUTION INTRATHECAL at 11:54

## 2024-05-10 RX ADMIN — DEXAMETHASONE SODIUM PHOSPHATE 5 MG: 10 INJECTION INTRAMUSCULAR; INTRAVENOUS at 12:59

## 2024-05-10 RX ADMIN — DEXAMETHASONE SODIUM PHOSPHATE 5 MG: 10 INJECTION INTRAMUSCULAR; INTRAVENOUS at 11:54

## 2024-07-12 ENCOUNTER — OFFICE VISIT (OUTPATIENT)
Dept: CARDIOLOGY | Facility: CLINIC | Age: 74
End: 2024-07-12
Payer: MEDICARE

## 2024-07-12 VITALS
HEART RATE: 59 BPM | HEIGHT: 69 IN | WEIGHT: 167 LBS | BODY MASS INDEX: 24.73 KG/M2 | SYSTOLIC BLOOD PRESSURE: 130 MMHG | DIASTOLIC BLOOD PRESSURE: 68 MMHG

## 2024-07-12 DIAGNOSIS — Z79.01 CURRENT USE OF LONG TERM ANTICOAGULATION: ICD-10-CM

## 2024-07-12 DIAGNOSIS — I48.0 PAF (PAROXYSMAL ATRIAL FIBRILLATION): Primary | ICD-10-CM

## 2024-07-12 RX ORDER — MULTIPLE VITAMINS W/ MINERALS TAB 9MG-400MCG
1 TAB ORAL DAILY
COMMUNITY
Start: 2024-06-24

## 2024-07-12 NOTE — PROGRESS NOTES
"[facility] -  CLINIC FOLLOW UP     Patient Care Team:  Ingrid Vergara MD as PCP - General (Family Medicine)  Ingrid Vergara MD as PCP - Family Medicine  Ingrid Vergara MD as Referring Physician (Family Medicine)  Jose Manuel Akhtar MD as Cardiologist (Cardiology)    Chief Complaint: PAF     Subjective   EP Problems:  1.  Paroxysmal atrial fibrillation  -Previously on dronedarone, poorly tolerated  -PVI 12/5/22  2.  Paroxysmal SVT  3.  NSVT  4.  Frequent APCs  -Holter monitor with 5% burden     Cardiology Problems:  1.  Hypertension     Medical Problems:  1.  Hypothyroidism    HPI: Today I had the pleasure of seeing David Schulz in the cardiology clinic for follow up. He is a 74 yea rold male with a history of PVI in Dec 2022. He follows up today for follow up     Arthritis continues to be his main issue. He tripped recently in the road with his sandal and fell, causing abrasions to his knee. He denies any dizziness or presyncope. He reports recent labs by PCP. He denies any chest pain, SOB, or other new issues. Denies any ER visits or hospitalizations.     Objective     Visit Vitals  /68   Pulse 59   Ht 175.3 cm (69\")   Wt 75.8 kg (167 lb)   BMI 24.66 kg/m²           Vitals reviewed.   Constitutional:       Appearance: Healthy appearance. Not in distress.   Eyes:      Extraocular Movements: Extraocular movements intact.      Conjunctiva/sclera: Conjunctivae normal.      Pupils: Pupils are equal, round, and reactive to light.   HENT:      Head: Normocephalic and atraumatic.      Nose: Nose normal.    Mouth/Throat:      Lips: Pink.      Mouth: Mucous membranes are moist.      Pharynx: Oropharynx is clear.   Neck:      Vascular: No carotid bruit or JVD. JVD normal.   Pulmonary:      Effort: Pulmonary effort is normal.      Breath sounds: Normal breath sounds.   Chest:      Chest wall: Not tender to palpatation.   Cardiovascular:      PMI at left midclavicular line. " Normal rate. Regular rhythm. Normal S1. Normal S2.       Murmurs: There is no murmur.      No gallop.  No rub.   Pulses:     Radial: 2+ bilaterally.  Edema:     Peripheral edema absent.   Abdominal:      General: Bowel sounds are normal.      Palpations: Abdomen is soft.   Musculoskeletal: Normal range of motion.      Extremities: No clubbing present.     Cervical back: Normal range of motion. Skin:     General: Skin is warm and dry.   Neurological:      General: No focal deficit present.      Mental Status: Alert and oriented to person, place, and time.   Psychiatric:         Attention and Perception: Attention normal.         Mood and Affect: Affect normal.         Speech: Speech normal.         Behavior: Behavior normal.         Cognition and Memory: Cognition normal.             The following portions of the patient's history were reviewed and updated as appropriate: allergies, current medications, past medical history, past social history, past and problem list.     Review of Systems   Constitutional: Negative.    HENT: Negative.     Eyes: Negative.    Respiratory: Negative.     Cardiovascular: Negative.    Gastrointestinal: Negative.    Endocrine: Negative.    Genitourinary: Negative.    Musculoskeletal: Negative.    Skin: Negative.    Allergic/Immunologic: Negative.    Neurological: Negative.    Hematological: Negative.    Psychiatric/Behavioral: Negative.            Echo EF Estimated  Lab Results   Component Value Date    ECHOEFEST 70 06/08/2020         ECG 12 Lead    Date/Time: 7/12/2024 10:43 AM  Performed by: Angela Honeycutt PA    Authorized by: Angela Honeycutt PA  Comparison: compared with previous ECG from 12/28/2023  Rhythm: sinus rhythm  Rate: normal  BPM: 60  QRS axis: left            Medication Review: yes    Current Outpatient Medications:     apixaban (Eliquis) 5 MG tablet tablet, Take 1 tablet by mouth Every 12 (Twelve) Hours., Disp: 180 tablet, Rfl: 3    levothyroxine (SYNTHROID, LEVOTHROID)  "137 MCG tablet, Take 1 tablet by mouth Daily., Disp: , Rfl:     lisinopril (PRINIVIL,ZESTRIL) 10 MG tablet, TAKE 1 TABLET BY MOUTH EVERY DAY, Disp: 90 tablet, Rfl: 3    multivitamin with minerals (Centrum Silver 50+Men) tablet tablet, Take 1 tablet by mouth Daily., Disp: , Rfl:     QUEtiapine (SEROquel) 25 MG tablet, Take 1 tablet by mouth every night at bedtime., Disp: , Rfl:     tamsulosin (FLOMAX) 0.4 MG capsule 24 hr capsule, Take 1 capsule by mouth 2 (Two) Times a Day., Disp: , Rfl:    Allergies   Allergen Reactions    Morphine Itching    Morphine And Codeine     Carvedilol Nausea And Vomiting       I have reviewed       CBC:No results for input(s): \"WBC\", \"HGB\", \"HCT\", \"PLT\", \"IRONSERUM\", \"IRON\" in the last 26188 hours.   BMP/CMP:  Lab Results - Last 18 Months   Lab Units 12/21/23  1224   CREATININE mg/dL 0.70         Results for orders placed during the hospital encounter of 01/10/22    Adult Transthoracic Echo Complete w/ Color, Spectral and Contrast if necessary per protocol    Interpretation Summary  · Left ventricular ejection fraction appears to be 56 - 60%. Left ventricular systolic function is normal.  · Left ventricular diastolic function was normal.  · Abnormal global longitudinal LV strain (GLS) = -14.0%.  · Mild aortic valve regurgitation is present.  · Estimated right ventricular systolic pressure from tricuspid regurgitation is normal (<35 mmHg).     Assessment:   Diagnoses and all orders for this visit:    1. PAF (paroxysmal atrial fibrillation) (Primary)  -     ECG 12 Lead    2. Current use of long term anticoagulation    PAF: s/p PVI ablation Dec 2022. He is doing very well and has not had any recurrence of afib. Previous monitor in October last year showed nonsustained runs of SVT, but no afib.   -QUGTR2PIIN score of 3, continue anticoagulation indefinitely  -Briefly discussed Watchman, but no current bleeding issues.   -No medication changes at this time. Follow up in EP Clinic in one year "       I spent 20 minutes caring for David on this date of service. This time includes time spent by me in the following activities:preparing for the visit, reviewing tests, obtaining and/or reviewing a separately obtained history, performing a medically appropriate examination and/or evaluation , counseling and educating the patient/family/caregiver, ordering medications, tests, or procedures, referring and communicating with other health care professionals , documenting information in the medical record, independently interpreting results and communicating that information with the patient/family/caregiver, and care coordination        Electronically signed by KASSY Cortez

## 2024-07-25 RX ORDER — LISINOPRIL 10 MG/1
TABLET ORAL
Qty: 90 TABLET | Refills: 3 | Status: SHIPPED | OUTPATIENT
Start: 2024-07-25

## 2024-08-28 ENCOUNTER — TELEMEDICINE (OUTPATIENT)
Dept: GASTROENTEROLOGY | Age: 74
End: 2024-08-28
Payer: MEDICARE

## 2024-08-28 DIAGNOSIS — K59.00 CONSTIPATION, UNSPECIFIED CONSTIPATION TYPE: ICD-10-CM

## 2024-08-28 DIAGNOSIS — R19.5 CHANGE IN STOOL CALIBER: Primary | ICD-10-CM

## 2024-08-28 DIAGNOSIS — Z86.010 HISTORY OF COLON POLYPS: ICD-10-CM

## 2024-08-28 DIAGNOSIS — K57.90 DIVERTICULOSIS: ICD-10-CM

## 2024-08-28 PROCEDURE — 99214 OFFICE O/P EST MOD 30 MIN: CPT | Performed by: NURSE PRACTITIONER

## 2024-08-28 PROCEDURE — 1123F ACP DISCUSS/DSCN MKR DOCD: CPT | Performed by: NURSE PRACTITIONER

## 2024-08-28 PROCEDURE — 1036F TOBACCO NON-USER: CPT | Performed by: NURSE PRACTITIONER

## 2024-08-28 PROCEDURE — 3017F COLORECTAL CA SCREEN DOC REV: CPT | Performed by: NURSE PRACTITIONER

## 2024-08-28 PROCEDURE — G8421 BMI NOT CALCULATED: HCPCS | Performed by: NURSE PRACTITIONER

## 2024-08-28 PROCEDURE — G8428 CUR MEDS NOT DOCUMENT: HCPCS | Performed by: NURSE PRACTITIONER

## 2024-08-28 ASSESSMENT — ENCOUNTER SYMPTOMS
NAUSEA: 0
BLOOD IN STOOL: 0
ANAL BLEEDING: 0
RECTAL PAIN: 0
DIARRHEA: 0
TROUBLE SWALLOWING: 0
SHORTNESS OF BREATH: 0
ABDOMINAL PAIN: 0
CHOKING: 0
COUGH: 0
ABDOMINAL DISTENTION: 0
CONSTIPATION: 1
VOMITING: 0

## 2024-08-28 NOTE — PATIENT INSTRUCTIONS
When should you call your doctor?   You have questions or concerns.     You don't understand how to prepare for your procedure.     You are having trouble with the bowel prep.     You become ill before the procedure (such as fever, flu, or a cold).     You need to reschedule or have changed your mind about having the procedure.   Where can you learn more?  Go to https://[email or web address].net/patientEd and enter C315 to learn more about \"Colonoscopy: Before Your Procedure.\"  Current as of: May 4, 2022               Content Version: 13.5  © 2836-5867 [facility].   Care instructions adapted under license by "[employer]". If you have questions about a medical condition or this instruction, always ask your healthcare professional. [facility] disclaims any warranty or liability for your use of this information.

## 2024-08-28 NOTE — PROGRESS NOTES
to explain proctalgia/perianal itching, 1 year recall    COLONOSCOPY N/A 05/10/2022    Dr Feliz, [facility] , Benign HP, Mod diverticulosis, 3 year colon recall    FOOT SURGERY      x 2    HERNIA REPAIR      JOINT REPLACEMENT Right     hip    THYROIDECTOMY      VARICOSE VEIN SURGERY      VASECTOMY         Family History   Problem Relation Age of Onset    Dementia Mother     Obesity Mother     Stroke Father     Heart Attack Father     Other Sister     Other Brother         reheumatic fever- valve issue    Colon Cancer Neg Hx     Colon Polyps Neg Hx     Esophageal Cancer Neg Hx     Liver Cancer Neg Hx     Rectal Cancer Neg Hx     Stomach Cancer Neg Hx        Social History     Socioeconomic History    Marital status: Single   Tobacco Use    Smoking status: Never    Smokeless tobacco: Never   Vaping Use    Vaping status: Never Used   Substance and Sexual Activity    Alcohol use: Yes     Alcohol/week: 3.0 standard drinks of alcohol     Types: 3 Glasses of wine per week     Comment: weekly    Drug use: No   Social History Narrative    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)    Here with his significant other    Previously  and     He has 1 son and 1 daughter    Education masters degree in fine arts    Former distance runner also bicyclist not so much these days    Smoked minimally 3 to 4 years total many years ago denies alcohol consumption or substance usage     Social Determinants of Health      [facility]    Family and Community Support    Received from [facility], [facility]    Abuse Screen    Received from [facility], [facility]    Housing Stability       Current Outpatient Medications   Medication Sig Dispense Refill    lisinopril (PRINIVIL;ZESTRIL) 5 MG tablet Take 1 tablet by mouth daily      tamsulosin (FLOMAX) 0.4 MG capsule Take 2 capsules by mouth  nightly 180 capsule 3    ELIQUIS 5 MG TABS tablet TAKE 1 TABLET BY MOUTH TWICE A DAY (Patient taking differently: Take 1 tablet by mouth 2 times daily) 60 tablet 5    QUEtiapine (SEROQUEL) 25 MG tablet Take 1 tablet by mouth nightly      levothyroxine (SYNTHROID) 137 MCG tablet Take 1 tablet by mouth Daily       No current facility-administered medications for this visit.       Allergies   Allergen Reactions    Morphine Itching    Coreg [Carvedilol] Nausea And Vomiting           Objective:     There were no vitals taken for this visit.    Physical Exam  Vitals reviewed.   Constitutional:       General: He is not in acute distress.     Appearance: He is well-developed.   HENT:      Head: Normocephalic and atraumatic.      Right Ear: External ear normal.      Left Ear: External ear normal.      Nose: Nose normal.   Eyes:      General: No scleral icterus.        Right eye: No discharge.         Left eye: No discharge.      Conjunctiva/sclera: Conjunctivae normal.      Pupils: Pupils are equal, round, and reactive to light.   Cardiovascular:      Rate and Rhythm: Normal rate and regular rhythm.      Heart sounds: Normal heart sounds. No murmur heard.  Pulmonary:      Effort: Pulmonary effort is normal. No respiratory distress.      Breath sounds: Normal breath sounds. No wheezing or rales.   Abdominal:      General: Bowel sounds are normal. There is no distension.      Palpations: Abdomen is soft. There is no mass.      Tenderness: There is no abdominal tenderness. There is no guarding or rebound.   Musculoskeletal:         General: Normal range of motion.      Cervical back: Normal range of motion and neck supple.   Skin:     General: Skin is warm and dry.      Coloration: Skin is not pale.   Neurological:      Mental Status: He is alert and oriented to person, place, and time.   Psychiatric:         Behavior: Behavior normal.

## 2024-08-29 ENCOUNTER — TELEPHONE (OUTPATIENT)
Dept: CARDIOLOGY | Facility: CLINIC | Age: 74
End: 2024-08-29
Payer: MEDICARE

## 2024-09-03 ENCOUNTER — HOSPITAL ENCOUNTER (OUTPATIENT)
Dept: ULTRASOUND IMAGING | Age: 74
Discharge: HOME | End: 2024-09-03
Payer: MEDICARE

## 2024-09-03 DIAGNOSIS — K40.90 RIGHT INGUINAL HERNIA: Primary | ICD-10-CM

## 2024-09-03 DIAGNOSIS — K40.90 RIGHT INGUINAL HERNIA: ICD-10-CM

## 2024-09-03 PROCEDURE — 76857 US EXAM PELVIC LIMITED: CPT

## 2024-09-09 ENCOUNTER — TELEPHONE (OUTPATIENT)
Dept: GASTROENTEROLOGY | Age: 74
End: 2024-09-09

## 2024-09-17 ENCOUNTER — APPOINTMENT (OUTPATIENT)
Dept: OPERATING ROOM | Age: 74
End: 2024-09-17
Attending: INTERNAL MEDICINE

## 2024-09-17 ENCOUNTER — HOSPITAL ENCOUNTER (OUTPATIENT)
Age: 74
Setting detail: OUTPATIENT SURGERY
Discharge: HOME | End: 2024-09-17
Attending: INTERNAL MEDICINE | Admitting: INTERNAL MEDICINE
Payer: MEDICARE

## 2024-09-17 ENCOUNTER — ANESTHESIA (OUTPATIENT)
Dept: OPERATING ROOM | Age: 74
End: 2024-09-17

## 2024-09-17 ENCOUNTER — ANESTHESIA EVENT (OUTPATIENT)
Dept: OPERATING ROOM | Age: 74
End: 2024-09-17

## 2024-09-17 VITALS
RESPIRATION RATE: 16 BRPM | BODY MASS INDEX: 23.85 KG/M2 | SYSTOLIC BLOOD PRESSURE: 131 MMHG | TEMPERATURE: 97.5 F | OXYGEN SATURATION: 95 % | WEIGHT: 161 LBS | HEIGHT: 69 IN | DIASTOLIC BLOOD PRESSURE: 71 MMHG | HEART RATE: 55 BPM

## 2024-09-17 PROCEDURE — G8907 PT DOC NO EVENTS ON DISCHARG: HCPCS

## 2024-09-17 PROCEDURE — 45378 DIAGNOSTIC COLONOSCOPY: CPT | Performed by: INTERNAL MEDICINE

## 2024-09-17 PROCEDURE — G8918 PT W/O PREOP ORDER IV AB PRO: HCPCS

## 2024-09-17 PROCEDURE — 45378 DIAGNOSTIC COLONOSCOPY: CPT

## 2024-09-17 RX ORDER — SODIUM CHLORIDE, SODIUM LACTATE, POTASSIUM CHLORIDE, CALCIUM CHLORIDE 600; 310; 30; 20 MG/100ML; MG/100ML; MG/100ML; MG/100ML
INJECTION, SOLUTION INTRAVENOUS CONTINUOUS
Status: DISCONTINUED | OUTPATIENT
Start: 2024-09-17 | End: 2024-09-17 | Stop reason: HOSPADM

## 2024-09-17 RX ORDER — LIDOCAINE HYDROCHLORIDE 10 MG/ML
INJECTION, SOLUTION INFILTRATION; PERINEURAL
Status: DISCONTINUED | OUTPATIENT
Start: 2024-09-17 | End: 2024-09-17 | Stop reason: SDUPTHER

## 2024-09-17 RX ORDER — PROPOFOL 10 MG/ML
INJECTION, EMULSION INTRAVENOUS
Status: DISCONTINUED | OUTPATIENT
Start: 2024-09-17 | End: 2024-09-17 | Stop reason: SDUPTHER

## 2024-09-17 RX ADMIN — PROPOFOL 100 MG: 10 INJECTION, EMULSION INTRAVENOUS at 11:54

## 2024-09-17 RX ADMIN — PROPOFOL 50 MG: 10 INJECTION, EMULSION INTRAVENOUS at 12:00

## 2024-09-17 RX ADMIN — SODIUM CHLORIDE, SODIUM LACTATE, POTASSIUM CHLORIDE, CALCIUM CHLORIDE: 600; 310; 30; 20 INJECTION, SOLUTION INTRAVENOUS at 11:44

## 2024-09-17 RX ADMIN — PROPOFOL 50 MG: 10 INJECTION, EMULSION INTRAVENOUS at 11:57

## 2024-09-17 RX ADMIN — LIDOCAINE HYDROCHLORIDE 25 MG: 10 INJECTION, SOLUTION INFILTRATION; PERINEURAL at 11:48

## 2024-09-17 RX ADMIN — SODIUM CHLORIDE, SODIUM LACTATE, POTASSIUM CHLORIDE, CALCIUM CHLORIDE: 600; 310; 30; 20 INJECTION, SOLUTION INTRAVENOUS at 10:33

## 2024-09-17 RX ADMIN — LIDOCAINE HYDROCHLORIDE 25 MG: 10 INJECTION, SOLUTION INFILTRATION; PERINEURAL at 11:53

## 2024-09-17 ASSESSMENT — PAIN - FUNCTIONAL ASSESSMENT
PAIN_FUNCTIONAL_ASSESSMENT: NONE - DENIES PAIN
PAIN_FUNCTIONAL_ASSESSMENT: 0-10
PAIN_FUNCTIONAL_ASSESSMENT: NONE - DENIES PAIN

## 2024-11-04 ENCOUNTER — OFFICE VISIT (OUTPATIENT)
Age: 74
End: 2024-11-04

## 2024-11-04 VITALS — HEIGHT: 69 IN | WEIGHT: 161 LBS | BODY MASS INDEX: 23.85 KG/M2

## 2024-11-04 DIAGNOSIS — M18.11 PRIMARY OSTEOARTHRITIS OF FIRST CARPOMETACARPAL JOINT OF RIGHT HAND: Primary | ICD-10-CM

## 2024-11-04 NOTE — PROGRESS NOTES
[facility]  [facility]  [address]  [facility] [id number] [phone]     Patient: Reid You   YOB: 1950   Date: 11/4/2024     Chief Complaint   Patient presents with    right thumb        History of Present Illness  Reid is a 74 y.o. male who returns today approximately 3 months status post right thumb CMC arthroplasty on 8/12/2024, doing well.  He feels that he has continued to make progress from his last visit.  He has discontinued his brace.  Overall, happy with his progress.    Past Medical History:   Diagnosis Date    Atrial fibrillation (HCC)     Bipolar disorder (HCC)     Colon polyps     Hypertension     Hypothyroidism     Insomnia       Past Surgical History:   Procedure Laterality Date    ABLATION OF DYSRHYTHMIC FOCUS  12/05/2022    AFIB    CARPAL TUNNEL RELEASE Right     approx 5 years ago    CLAVICLE SURGERY      x 2    COLONOSCOPY  ? 5-6 years ago        COLONOSCOPY N/A 12/19/2014    [facility]:  normal,   5y recall    COLONOSCOPY N/A 05/05/2021    Dr Feliz, Benign Serrated Polyps (SSA -dysplasia, HP), Moderate diverticulosis, No clear-cut lesions to explain proctalgia/perianal itching, 1 year recall    COLONOSCOPY N/A 05/10/2022    Dr Feliz, [facility] , Benign HP, Mod diverticulosis, 3 year colon recall    COLONOSCOPY N/A 09/17/2024    Dr Feliz, Mod diverticulosis left colon, int hem Gr 1 wo bleeding, no clear-cut lesions to explain recent symptoms were discovered, 5 year recall    FOOT SURGERY      x 2    HERNIA REPAIR      JOINT REPLACEMENT Right     hip    THYROIDECTOMY      VARICOSE VEIN SURGERY      VASECTOMY        Social History     Socioeconomic History    Marital status:      Spouse name: None    Number of children: None    Years of education: None    Highest education level: None   Tobacco Use    Smoking status: Never    Smokeless tobacco: Never   Vaping Use

## 2024-11-12 ENCOUNTER — OFFICE VISIT (OUTPATIENT)
Dept: GASTROENTEROLOGY | Age: 74
End: 2024-11-12
Payer: MEDICARE

## 2024-11-12 VITALS
HEART RATE: 66 BPM | WEIGHT: 168 LBS | OXYGEN SATURATION: 98 % | BODY MASS INDEX: 24.88 KG/M2 | SYSTOLIC BLOOD PRESSURE: 132 MMHG | HEIGHT: 69 IN | DIASTOLIC BLOOD PRESSURE: 78 MMHG

## 2024-11-12 DIAGNOSIS — K59.00 CONSTIPATION, UNSPECIFIED CONSTIPATION TYPE: Primary | ICD-10-CM

## 2024-11-12 DIAGNOSIS — Z86.0100 HISTORY OF COLON POLYPS: ICD-10-CM

## 2024-11-12 PROCEDURE — 99213 OFFICE O/P EST LOW 20 MIN: CPT | Performed by: NURSE PRACTITIONER

## 2024-11-12 PROCEDURE — 1123F ACP DISCUSS/DSCN MKR DOCD: CPT | Performed by: NURSE PRACTITIONER

## 2024-11-12 PROCEDURE — 1036F TOBACCO NON-USER: CPT | Performed by: NURSE PRACTITIONER

## 2024-11-12 PROCEDURE — G8420 CALC BMI NORM PARAMETERS: HCPCS | Performed by: NURSE PRACTITIONER

## 2024-11-12 PROCEDURE — 1159F MED LIST DOCD IN RCRD: CPT | Performed by: NURSE PRACTITIONER

## 2024-11-12 PROCEDURE — G8484 FLU IMMUNIZE NO ADMIN: HCPCS | Performed by: NURSE PRACTITIONER

## 2024-11-12 PROCEDURE — G8427 DOCREV CUR MEDS BY ELIG CLIN: HCPCS | Performed by: NURSE PRACTITIONER

## 2024-11-12 PROCEDURE — 3017F COLORECTAL CA SCREEN DOC REV: CPT | Performed by: NURSE PRACTITIONER

## 2024-11-12 NOTE — PROGRESS NOTES
Subjective:     Patient ID: Reid You is a 74 y.o. male  PCP: Rogers Beatty MD  Referring Provider: No ref. provider found    HPI  Patient presents to the office today with the following complaints: Follow-up and Colonoscopy      Patient seen in the office today for follow up after Colonoscopy   Colonoscopy and pathology reports reviewed and discussed with the patient and all questions answered   Reports are in Epic  Denies any postprocedure complications       He is taking daily stool softener with miralax in the morning and this is working well to control his constipation       Assessment:     1. Constipation, unspecified constipation type  2. History of colon polyps       Review of Systems   Constitutional:  Negative for activity change, appetite change, fatigue, fever and unexpected weight change.   HENT:  Negative for trouble swallowing.    Respiratory:  Negative for cough, choking and shortness of breath.    Cardiovascular:  Negative for chest pain.   Gastrointestinal:  Positive for constipation. Negative for abdominal distention, abdominal pain, anal bleeding, blood in stool, diarrhea, nausea, rectal pain and vomiting.   Allergic/Immunologic: Negative for food allergies.   All other systems reviewed and are negative.      Plan:   May add MiraLAX 17 g once or twice daily for constipation to be adjusted to desired effect of 1-2 soft stools not requiring excessive straining.  Will consider other prescription laxatives later if necessary    Orders  No orders of the defined types were placed in this encounter.    Medications  No orders of the defined types were placed in this encounter.        Patient History:     Past Medical History:   Diagnosis Date    Atrial fibrillation (HCC)     Bipolar disorder (HCC)     Colon polyps     Hypertension     Hypothyroidism     Insomnia        Past Surgical History:   Procedure Laterality Date    ABLATION OF DYSRHYTHMIC FOCUS  12/05/2022    AFIB    CARPAL TUNNEL

## 2024-11-19 ASSESSMENT — ENCOUNTER SYMPTOMS
COUGH: 0
ANAL BLEEDING: 0
BLOOD IN STOOL: 0
SHORTNESS OF BREATH: 0
VOMITING: 0
ABDOMINAL PAIN: 0
RECTAL PAIN: 0
NAUSEA: 0
TROUBLE SWALLOWING: 0
ABDOMINAL DISTENTION: 0
CONSTIPATION: 1
CHOKING: 0
DIARRHEA: 0

## 2024-12-11 RX ORDER — APIXABAN 5 MG/1
5 TABLET, FILM COATED ORAL EVERY 12 HOURS
Qty: 180 TABLET | Refills: 3 | Status: SHIPPED | OUTPATIENT
Start: 2024-12-11

## 2025-01-09 NOTE — PROGRESS NOTES
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)    Here with his significant other    Previously  and     He has 1 son and 1 daughter    Education masters degree in fine arts    Former distance runner also bicyclist not so much these days    Smoked minimally 3 to 4 years total many years ago denies alcohol consumption or substance usage     Social Determinants of Health      [facility]    Family and Community Support    Received from [facility]    Abuse Screen    [facility]    Housing Stability      Social History     Occupational History    Not on file   Tobacco Use    Smoking status: Never    Smokeless tobacco: Never   Vaping Use    Vaping status: Never Used   Substance and Sexual Activity    Alcohol use: Yes     Alcohol/week: 3.0 standard drinks of alcohol     Types: 3 Glasses of wine per week     Comment: weekly    Drug use: No    Sexual activity: Not on file        Tobacco Use      Smoking status: Never      Smokeless tobacco: Never     Family History   Problem Relation Age of Onset    Dementia Mother     Obesity Mother     Stroke Father     Heart Attack Father     Other Sister     Other Brother         reheumatic fever- valve issue    Colon Cancer Neg Hx     Colon Polyps Neg Hx     Esophageal Cancer Neg Hx     Liver Cancer Neg Hx     Rectal Cancer Neg Hx

## 2025-01-10 ENCOUNTER — OFFICE VISIT (OUTPATIENT)
Age: 75
End: 2025-01-10

## 2025-01-10 VITALS — BODY MASS INDEX: 24.88 KG/M2 | WEIGHT: 168 LBS | HEIGHT: 69 IN

## 2025-01-10 DIAGNOSIS — M19.032 ARTHRITIS OF SCAPHOID-TRAPEZIUM-TRAPEZOID JOINT OF BOTH HANDS: Primary | ICD-10-CM

## 2025-01-10 DIAGNOSIS — M19.031 ARTHRITIS OF SCAPHOID-TRAPEZIUM-TRAPEZOID JOINT OF BOTH HANDS: Primary | ICD-10-CM

## 2025-01-10 DIAGNOSIS — M19.131 SCAPHOLUNATE ADVANCED COLLAPSE OF WRIST, RIGHT: ICD-10-CM

## 2025-01-10 DIAGNOSIS — M19.132 SCAPHOLUNATE ADVANCED COLLAPSE OF LEFT WRIST: ICD-10-CM

## 2025-01-10 RX ORDER — LIDOCAINE HYDROCHLORIDE 10 MG/ML
5 INJECTION, SOLUTION INFILTRATION; PERINEURAL ONCE
Status: COMPLETED | OUTPATIENT
Start: 2025-01-10 | End: 2025-01-10

## 2025-01-10 RX ORDER — BETAMETHASONE SODIUM PHOSPHATE AND BETAMETHASONE ACETATE 3; 3 MG/ML; MG/ML
6 INJECTION, SUSPENSION INTRA-ARTICULAR; INTRALESIONAL; INTRAMUSCULAR; SOFT TISSUE ONCE
Status: COMPLETED | OUTPATIENT
Start: 2025-01-10 | End: 2025-01-10

## 2025-01-10 RX ADMIN — LIDOCAINE HYDROCHLORIDE 5 ML: 10 INJECTION, SOLUTION INFILTRATION; PERINEURAL at 11:47

## 2025-01-10 RX ADMIN — LIDOCAINE HYDROCHLORIDE 5 ML: 10 INJECTION, SOLUTION INFILTRATION; PERINEURAL at 11:46

## 2025-01-10 RX ADMIN — BETAMETHASONE SODIUM PHOSPHATE AND BETAMETHASONE ACETATE 6 MG: 3; 3 INJECTION, SUSPENSION INTRA-ARTICULAR; INTRALESIONAL; INTRAMUSCULAR; SOFT TISSUE at 11:46

## 2025-01-10 RX ADMIN — BETAMETHASONE SODIUM PHOSPHATE AND BETAMETHASONE ACETATE 6 MG: 3; 3 INJECTION, SUSPENSION INTRA-ARTICULAR; INTRALESIONAL; INTRAMUSCULAR; SOFT TISSUE at 11:45

## 2025-01-27 ENCOUNTER — TELEPHONE (OUTPATIENT)
Age: 75
End: 2025-01-27

## 2025-01-27 NOTE — TELEPHONE ENCOUNTER
Pt called and said that he has sx in 2015 or 2016 and needs to know if he needs something before his dental work on wednesday

## 2025-03-21 ENCOUNTER — OFFICE VISIT (OUTPATIENT)
Age: 75
End: 2025-03-21

## 2025-03-21 VITALS — WEIGHT: 162 LBS | BODY MASS INDEX: 23.99 KG/M2 | HEIGHT: 69 IN

## 2025-03-21 DIAGNOSIS — M25.551 RIGHT HIP PAIN: Primary | ICD-10-CM

## 2025-03-21 DIAGNOSIS — M70.61 GREATER TROCHANTERIC BURSITIS OF RIGHT HIP: ICD-10-CM

## 2025-03-21 DIAGNOSIS — Z96.641 S/P TOTAL RIGHT HIP ARTHROPLASTY: ICD-10-CM

## 2025-03-21 RX ORDER — LIDOCAINE HYDROCHLORIDE 10 MG/ML
6 INJECTION, SOLUTION INFILTRATION; PERINEURAL ONCE
Status: COMPLETED | OUTPATIENT
Start: 2025-03-21 | End: 2025-03-21

## 2025-03-21 RX ORDER — BETAMETHASONE SODIUM PHOSPHATE AND BETAMETHASONE ACETATE 3; 3 MG/ML; MG/ML
12 INJECTION, SUSPENSION INTRA-ARTICULAR; INTRALESIONAL; INTRAMUSCULAR; SOFT TISSUE ONCE
Status: COMPLETED | OUTPATIENT
Start: 2025-03-21 | End: 2025-03-21

## 2025-03-21 RX ADMIN — BETAMETHASONE SODIUM PHOSPHATE AND BETAMETHASONE ACETATE 12 MG: 3; 3 INJECTION, SUSPENSION INTRA-ARTICULAR; INTRALESIONAL; INTRAMUSCULAR; SOFT TISSUE at 10:16

## 2025-03-21 RX ADMIN — LIDOCAINE HYDROCHLORIDE 6 ML: 10 INJECTION, SOLUTION INFILTRATION; PERINEURAL at 10:17

## 2025-03-21 NOTE — PROGRESS NOTES
applicable) and other individuals in attendance at the appointment consented to the use of AI, including the recording.                    Electronically signed by ALVIN MITTAL MD on 3/21/2025 at 11:22 AM.

## 2025-03-26 ENCOUNTER — TRANSCRIBE ORDERS (OUTPATIENT)
Dept: ADMINISTRATIVE | Facility: HOSPITAL | Age: 75
End: 2025-03-26
Payer: MEDICARE

## 2025-03-26 ENCOUNTER — LAB (OUTPATIENT)
Dept: LAB | Facility: HOSPITAL | Age: 75
End: 2025-03-26
Payer: MEDICARE

## 2025-03-26 DIAGNOSIS — R05.9 COUGH, UNSPECIFIED TYPE: ICD-10-CM

## 2025-03-26 DIAGNOSIS — J02.9 ACUTE PHARYNGITIS, UNSPECIFIED ETIOLOGY: ICD-10-CM

## 2025-03-26 DIAGNOSIS — R50.9 HYPERTHERMIA-INDUCED DEFECT: ICD-10-CM

## 2025-03-26 DIAGNOSIS — J02.9 ACUTE PHARYNGITIS, UNSPECIFIED ETIOLOGY: Primary | ICD-10-CM

## 2025-03-26 LAB
B PARAPERT DNA SPEC QL NAA+PROBE: NOT DETECTED
B PERT DNA SPEC QL NAA+PROBE: NOT DETECTED
C PNEUM DNA NPH QL NAA+NON-PROBE: NOT DETECTED
FLUAV SUBTYP SPEC NAA+PROBE: NOT DETECTED
FLUBV RNA ISLT QL NAA+PROBE: NOT DETECTED
HADV DNA SPEC NAA+PROBE: NOT DETECTED
HCOV 229E RNA SPEC QL NAA+PROBE: NOT DETECTED
HCOV HKU1 RNA SPEC QL NAA+PROBE: NOT DETECTED
HCOV NL63 RNA SPEC QL NAA+PROBE: NOT DETECTED
HCOV OC43 RNA SPEC QL NAA+PROBE: NOT DETECTED
HMPV RNA NPH QL NAA+NON-PROBE: NOT DETECTED
HPIV1 RNA ISLT QL NAA+PROBE: NOT DETECTED
HPIV2 RNA SPEC QL NAA+PROBE: NOT DETECTED
HPIV3 RNA NPH QL NAA+PROBE: NOT DETECTED
HPIV4 P GENE NPH QL NAA+PROBE: NOT DETECTED
M PNEUMO IGG SER IA-ACNC: NOT DETECTED
RHINOVIRUS RNA SPEC NAA+PROBE: DETECTED
RSV RNA NPH QL NAA+NON-PROBE: NOT DETECTED
S PYO AG THROAT QL: NEGATIVE
SARS-COV-2 RNA RESP QL NAA+PROBE: NOT DETECTED

## 2025-03-26 PROCEDURE — 87880 STREP A ASSAY W/OPTIC: CPT

## 2025-03-26 PROCEDURE — 87081 CULTURE SCREEN ONLY: CPT

## 2025-03-26 PROCEDURE — 0202U NFCT DS 22 TRGT SARS-COV-2: CPT

## 2025-03-28 LAB — BACTERIA SPEC AEROBE CULT: NORMAL

## 2025-05-22 ENCOUNTER — LAB (OUTPATIENT)
Dept: LAB | Facility: HOSPITAL | Age: 75
End: 2025-05-22
Payer: MEDICARE

## 2025-05-22 ENCOUNTER — TRANSCRIBE ORDERS (OUTPATIENT)
Dept: ADMINISTRATIVE | Facility: HOSPITAL | Age: 75
End: 2025-05-22
Payer: MEDICARE

## 2025-05-22 ENCOUNTER — HOSPITAL ENCOUNTER (OUTPATIENT)
Dept: GENERAL RADIOLOGY | Facility: HOSPITAL | Age: 75
Discharge: HOME | End: 2025-05-22
Payer: MEDICARE

## 2025-05-22 DIAGNOSIS — I51.9 MYXEDEMA HEART DISEASE: Primary | ICD-10-CM

## 2025-05-22 DIAGNOSIS — R10.84 GENERALIZED ABDOMINAL PAIN: Primary | ICD-10-CM

## 2025-05-22 DIAGNOSIS — I48.0 PAROXYSMAL ATRIAL FIBRILLATION: ICD-10-CM

## 2025-05-22 DIAGNOSIS — R10.9 ABDOMINAL PAIN, UNSPECIFIED ABDOMINAL LOCATION: ICD-10-CM

## 2025-05-22 DIAGNOSIS — E03.9 MYXEDEMA HEART DISEASE: ICD-10-CM

## 2025-05-22 DIAGNOSIS — R10.84 GENERALIZED ABDOMINAL PAIN: ICD-10-CM

## 2025-05-22 DIAGNOSIS — D70.9 NEUTROPENIA, UNSPECIFIED TYPE: ICD-10-CM

## 2025-05-22 DIAGNOSIS — I51.9 MYXEDEMA HEART DISEASE: ICD-10-CM

## 2025-05-22 DIAGNOSIS — E03.9 MYXEDEMA HEART DISEASE: Primary | ICD-10-CM

## 2025-05-22 LAB
ALBUMIN SERPL-MCNC: 4.6 G/DL (ref 3.5–5)
ALBUMIN/GLOB SERPL: 1.9 G/DL (ref 1.1–2.5)
ALP SERPL-CCNC: 49 U/L (ref 24–120)
ALT SERPL W P-5'-P-CCNC: 18 U/L (ref 0–50)
ANION GAP SERPL CALCULATED.3IONS-SCNC: 7 MMOL/L (ref 4–13)
AST SERPL-CCNC: 25 U/L (ref 7–45)
AUTO MIXED CELLS #: 0.4 10*3/MM3 (ref 0.1–2.6)
AUTO MIXED CELLS %: 8 % (ref 0.1–24)
BILIRUB SERPL-MCNC: 1 MG/DL (ref 0.1–1)
BUN SERPL-MCNC: 10 MG/DL (ref 5–21)
BUN/CREAT SERPL: 14.3
CALCIUM SPEC-SCNC: 8.2 MG/DL (ref 8.6–10.5)
CHLORIDE SERPL-SCNC: 97 MMOL/L (ref 98–110)
CO2 SERPL-SCNC: 27 MMOL/L (ref 24–31)
CREAT SERPL-MCNC: 0.7 MG/DL (ref 0.5–1.4)
EGFRCR SERPLBLD CKD-EPI 2021: 96.1 ML/MIN/1.73
ERYTHROCYTE [DISTWIDTH] IN BLOOD BY AUTOMATED COUNT: 13.2 % (ref 12.3–15.4)
ERYTHROCYTE [SEDIMENTATION RATE] IN BLOOD: 7 MM/HR (ref 0–20)
GLOBULIN UR ELPH-MCNC: 2.4 GM/DL
GLUCOSE SERPL-MCNC: 87 MG/DL (ref 65–99)
HCT VFR BLD AUTO: 38.7 % (ref 37.5–51)
HGB BLD-MCNC: 13.1 G/DL (ref 13–17.7)
LIPASE SERPL-CCNC: 139 U/L (ref 23–203)
LYMPHOCYTES # BLD AUTO: 1 10*3/MM3 (ref 0.7–3.1)
LYMPHOCYTES NFR BLD AUTO: 17.7 % (ref 19.6–45.3)
MCH RBC QN AUTO: 31.3 PG (ref 26.6–33)
MCHC RBC AUTO-ENTMCNC: 33.9 G/DL (ref 31.5–35.7)
MCV RBC AUTO: 92.4 FL (ref 79–97)
NEUTROPHILS NFR BLD AUTO: 4.2 10*3/MM3 (ref 1.7–7)
NEUTROPHILS NFR BLD AUTO: 74.3 % (ref 42.7–76)
PLATELET # BLD AUTO: 246 10*3/MM3 (ref 140–450)
PMV BLD AUTO: 9.1 FL (ref 6–12)
POTASSIUM SERPL-SCNC: 4.4 MMOL/L (ref 3.5–5.3)
PROT SERPL-MCNC: 7 G/DL (ref 6.3–8.7)
RBC # BLD AUTO: 4.19 10*6/MM3 (ref 4.14–5.8)
SODIUM SERPL-SCNC: 131 MMOL/L (ref 135–145)
WBC NRBC COR # BLD AUTO: 5.6 10*3/MM3 (ref 3.4–10.8)

## 2025-05-22 PROCEDURE — 85025 COMPLETE CBC W/AUTO DIFF WBC: CPT

## 2025-05-22 PROCEDURE — 83690 ASSAY OF LIPASE: CPT

## 2025-05-22 PROCEDURE — 74018 RADEX ABDOMEN 1 VIEW: CPT

## 2025-05-22 PROCEDURE — 36415 COLL VENOUS BLD VENIPUNCTURE: CPT

## 2025-05-22 PROCEDURE — 84443 ASSAY THYROID STIM HORMONE: CPT

## 2025-05-22 PROCEDURE — 85652 RBC SED RATE AUTOMATED: CPT

## 2025-05-22 PROCEDURE — 80053 COMPREHEN METABOLIC PANEL: CPT

## 2025-05-22 PROCEDURE — 84439 ASSAY OF FREE THYROXINE: CPT

## 2025-05-23 LAB
T4 FREE SERPL-MCNC: 1.37 NG/DL (ref 0.92–1.68)
TSH SERPL DL<=0.05 MIU/L-ACNC: 0.11 UIU/ML (ref 0.27–4.2)

## 2025-07-14 RX ORDER — LISINOPRIL 10 MG/1
10 TABLET ORAL DAILY
Qty: 90 TABLET | Refills: 0 | Status: SHIPPED | OUTPATIENT
Start: 2025-07-14

## 2025-07-15 ENCOUNTER — OFFICE VISIT (OUTPATIENT)
Dept: CARDIOLOGY | Facility: CLINIC | Age: 75
End: 2025-07-15
Payer: MEDICARE

## 2025-07-15 VITALS
HEIGHT: 69 IN | BODY MASS INDEX: 24.88 KG/M2 | DIASTOLIC BLOOD PRESSURE: 70 MMHG | SYSTOLIC BLOOD PRESSURE: 114 MMHG | OXYGEN SATURATION: 98 % | HEART RATE: 62 BPM | WEIGHT: 168 LBS

## 2025-07-15 DIAGNOSIS — I48.0 PAF (PAROXYSMAL ATRIAL FIBRILLATION): Primary | ICD-10-CM

## 2025-07-15 DIAGNOSIS — Z79.01 CURRENT USE OF LONG TERM ANTICOAGULATION: ICD-10-CM

## 2025-07-15 NOTE — PROGRESS NOTES
"[facility] Electrophysiology   Reason For Visit:  Atrial Fibrillation     Subjective        EP Problems:  1.  Paroxysmal atrial fibrillation  -Previously on dronedarone, poorly tolerated  -PVI 12/5/22  2.  Paroxysmal SVT  3.  NSVT  4.  Frequent APCs  -Holter monitor with 5% burden     Cardiology Problems:  1.  Hypertension     Medical Problems:  1.  Hypothyroidism    David Schulz is a 75 y.o. male with above pertinent PMH who presents for follow-up of paroxysmal atrial fibrillation.    Last seen in July 2024.  He is doing well from a cardiovascular standpoint.  No adjustments were made    Over the last year the patient's been doing well.  He denies any recurrence of atrial fibrillation.  Tolerating his Eliquis well with no bleeding concerns.       ROS: Pertinent findings as noted above        Pertinent past medical, surgical, family, and social history were reviewed.      Current Outpatient Medications:     Eliquis 5 MG tablet tablet, TAKE 1 TABLET BY MOUTH EVERY 12 HOURS, Disp: 180 tablet, Rfl: 3    levothyroxine (SYNTHROID, LEVOTHROID) 137 MCG tablet, Take 1 tablet by mouth Daily., Disp: , Rfl:     lisinopril (PRINIVIL,ZESTRIL) 10 MG tablet, TAKE 1 TABLET BY MOUTH EVERY DAY, Disp: 90 tablet, Rfl: 0    QUEtiapine (SEROquel) 25 MG tablet, Take 1 tablet by mouth every night at bedtime., Disp: , Rfl:     tamsulosin (FLOMAX) 0.4 MG capsule 24 hr capsule, Take 1 capsule by mouth 2 (Two) Times a Day., Disp: , Rfl:      Objective   Vital Signs:  /70   Pulse 62   Ht 175.3 cm (69.02\")   Wt 76.2 kg (168 lb)   SpO2 98%   BMI 24.80 kg/m²   Estimated body mass index is 24.8 kg/m² as calculated from the following:    Height as of this encounter: 175.3 cm (69.02\").    Weight as of this encounter: 76.2 kg (168 lb).      Constitutional:       Appearance: Healthy appearance. Not in distress.   Pulmonary:      Effort: Pulmonary effort is normal.      Breath sounds: Normal breath sounds and air entry. "   Cardiovascular:      PMI at left midclavicular line. Normal rate. Regular rhythm. Normal S1. Normal S2.       Murmurs: There is no murmur.      No gallop.  No click. No rub.   Pulses:     Intact distal pulses.   Edema:     Peripheral edema absent.   Neurological:      Mental Status: Alert and oriented to person, place and time.        Result Review :           ECG 12 Lead    Date/Time: 7/15/2025 10:11 AM  Performed by: Arden Novoa APRN    Authorized by: Arden Novoa APRN  Comparison: compared with previous ECG from 7/12/2024  Similar to previous ECG  Comparison to previous ECG: Normal sinus rhythm  Rhythm: sinus rhythm  Rate: normal  QRS axis: normal    Clinical impression: normal ECG            Assessment and Plan   Diagnoses and all orders for this visit:    1. PAF (paroxysmal atrial fibrillation) (Primary)  2. Current use of long term anticoagulation  - Sinus rhythm on EKG today  - No role for AV kary blockers at this time  - Continue Eliquis 5 mg twice daily given elevated [id number] score of 3  - Follow-up in 1 year                   I spent 2 minutes on the separately reported service of EKG interpretation. This time is not included in the time used to support the E/M service also reported today.      Follow Up   Return in about 1 year (around 7/15/2026).  Patient was given instructions and counseling regarding his condition or for health maintenance advice. Please see specific information pulled into the AVS if appropriate.       Part of this note may be an electronic transcription/translation of spoken language to printed text using the Dragon Dictation System.

## (undated) DEVICE — ENDO KIT,LOURDES HOSPITAL: Brand: MEDLINE INDUSTRIES, INC.

## (undated) DEVICE — PINNACLE INTRODUCER SHEATH: Brand: PINNACLE

## (undated) DEVICE — CANNULA NSL AD L7FT DIV O2 CO2 W/ M LUERLOCK TRMPT CONN

## (undated) DEVICE — Device: Brand: THERMOCOOL SMARTTOUCH SF

## (undated) DEVICE — BRUSH ENDOSCP 2 END CHN HEDGEHOG

## (undated) DEVICE — SI AVANTI+ 10F STD W/GW: Brand: AVANTI

## (undated) DEVICE — SI AVANTI+ 7F STD W/GW  NO OBT: Brand: AVANTI

## (undated) DEVICE — SOL IRR NACL 0.9PCT BT 1000ML

## (undated) DEVICE — SOLIDIFIER LIQUI LOC PLUS 2000CC

## (undated) DEVICE — KT NDL GUIDE STRL 18GA

## (undated) DEVICE — SUPPLEMENT DIGESTIVE H2O SOL GI-EASE

## (undated) DEVICE — Device: Brand: SMARTABLATE

## (undated) DEVICE — NDL TRNSEP BRK XS LNG 18G 98CM A/

## (undated) DEVICE — PK CATH CARD 30 CA/4

## (undated) DEVICE — Device: Brand: REFERENCE PATCH CARTO 3

## (undated) DEVICE — CLEANING SPONGE: Brand: KOALA™

## (undated) DEVICE — PAD E/S GRND SGL/FOIL 9FT/CORD DISP

## (undated) DEVICE — Device: Brand: SOUNDSTAR

## (undated) DEVICE — Device: Brand: WEBSTER CS

## (undated) DEVICE — ADAPTER CLEANING PORPOISE CLEANING

## (undated) DEVICE — PRESSURE MONITORING SET: Brand: TRUWAVE

## (undated) DEVICE — SYS COL WAST NAMIC IV SGL/LN FML/FIT W/VNT/SPK/HD 72IN

## (undated) DEVICE — PAD, DEFIB, ADULT, RADIOTRANS, PHYSIO: Brand: MEDLINE

## (undated) DEVICE — TBG PRESS/MONITR FIX M/F LL A/ 48IN STRL

## (undated) DEVICE — Device: Brand: VIZIGO

## (undated) DEVICE — DRSNG PRESS SAFEGUARD

## (undated) DEVICE — PTCH HEMOCON PRO 2X2IN

## (undated) DEVICE — SYS CLS VASC/VENI VASCADE MVP 6TO12F

## (undated) DEVICE — SINGLE PORT MANIFOLD: Brand: NEPTUNE 2

## (undated) DEVICE — STERILE (15.2 TAPERED TO 7.6 X 183CM) POLYETHYLENE ACCORDION-FOLDED COVER FOR USE WITH SIEMENS ACUNAV ULTRASOUND CATHETER FAMILY CONNECTOR: Brand: SWIFTLINK TRANSDUCER COVER

## (undated) DEVICE — Device: Brand: PENTARAY NAV